# Patient Record
Sex: FEMALE | Race: WHITE | NOT HISPANIC OR LATINO | Employment: UNEMPLOYED | URBAN - METROPOLITAN AREA
[De-identification: names, ages, dates, MRNs, and addresses within clinical notes are randomized per-mention and may not be internally consistent; named-entity substitution may affect disease eponyms.]

---

## 2017-01-16 ENCOUNTER — OFFICE VISIT (OUTPATIENT)
Dept: OBSTETRICS AND GYNECOLOGY | Facility: CLINIC | Age: 47
End: 2017-01-16

## 2017-01-16 VITALS
BODY MASS INDEX: 51.91 KG/M2 | DIASTOLIC BLOOD PRESSURE: 80 MMHG | HEIGHT: 63 IN | WEIGHT: 293 LBS | SYSTOLIC BLOOD PRESSURE: 140 MMHG

## 2017-01-16 DIAGNOSIS — Z79.890 HORMONE REPLACEMENT THERAPY: ICD-10-CM

## 2017-01-16 DIAGNOSIS — Z01.419 WELL WOMAN EXAM WITH ROUTINE GYNECOLOGICAL EXAM: Primary | ICD-10-CM

## 2017-01-16 PROCEDURE — 99396 PREV VISIT EST AGE 40-64: CPT | Performed by: OBSTETRICS & GYNECOLOGY

## 2017-01-16 RX ORDER — ESTRADIOL 0.1 MG/D
1 FILM, EXTENDED RELEASE TRANSDERMAL 2 TIMES WEEKLY
Qty: 8 PATCH | Refills: 12 | Status: SHIPPED | OUTPATIENT
Start: 2017-01-16 | End: 2018-07-21 | Stop reason: SDUPTHER

## 2017-01-16 RX ORDER — ESTRADIOL 0.1 MG/D
FILM, EXTENDED RELEASE TRANSDERMAL
Refills: 3 | COMMUNITY
Start: 2016-12-26 | End: 2017-01-16 | Stop reason: SDUPTHER

## 2017-01-16 NOTE — PROGRESS NOTES
Subjective   Cari Watson is a 46 y.o. female is here today as a self referral for annual.    History of Present Illness-here today for annual exam and hormone therapy.    The following portions of the patient's history were reviewed and updated as appropriate: allergies, current medications, past family history, past medical history, past social history, past surgical history and problem list.    Review of Systems   Constitutional: Negative.    HENT: Negative.    Eyes: Negative.    Respiratory: Negative.    Cardiovascular: Negative.    Gastrointestinal: Negative.    Endocrine: Negative.    Genitourinary: Negative.    Musculoskeletal: Negative.    Skin: Negative.    Allergic/Immunologic: Negative.    Neurological: Negative.    Hematological: Negative.    Psychiatric/Behavioral: Negative.        Objective   Physical Exam   Constitutional: She is oriented to person, place, and time. She appears well-developed and well-nourished.   HENT:   Head: Normocephalic and atraumatic.   Nose: Nose normal.   Eyes: Conjunctivae and EOM are normal. Pupils are equal, round, and reactive to light.   Neck: Normal range of motion. Neck supple.   Cardiovascular: Normal rate, regular rhythm and normal heart sounds.    Pulmonary/Chest: Effort normal and breath sounds normal. Right breast exhibits no inverted nipple, no mass, no nipple discharge, no skin change and no tenderness. Left breast exhibits no inverted nipple, no mass, no nipple discharge and no skin change. Breasts are symmetrical. There is no breast swelling.   Abdominal: Soft. Hernia confirmed negative in the right inguinal area and confirmed negative in the left inguinal area.   Genitourinary: Rectum normal. No breast tenderness, discharge or bleeding. Pelvic exam was performed with patient supine. No labial fusion. There is no rash, tenderness, lesion or injury on the right labia. There is no rash, tenderness, lesion or injury on the left labia. Right adnexum displays no  mass, no tenderness and no fullness. Left adnexum displays no mass, no tenderness and no fullness. No erythema or bleeding in the vagina. No foreign body in the vagina. No signs of injury around the vagina. No vaginal discharge found.   Genitourinary Comments: Cervical stump appears normal.   Neurological: She is alert and oriented to person, place, and time. She has normal reflexes.   Skin: Skin is warm and dry.   Psychiatric: She has a normal mood and affect. Her behavior is normal. Judgment and thought content normal.         Assessment/Plan   Problems Addressed this Visit     None      Visit Diagnoses     Well woman exam with routine gynecological exam    -  Primary    Hormone replacement therapy            Mammogram ordered.  Hormone patches refilled.

## 2017-01-16 NOTE — MR AVS SNAPSHOT
Cari Watson   1/16/2017 8:30 AM   Office Visit    Dept Phone:  [phone]   Encounter #:  [id number]    Provider:  Tejas Flores MD   Department:  [facility]                Your Full Care Plan              Today's Medication Changes          These changes are accurate as of: 1/16/17  8:57 AM.  If you have any questions, ask your nurse or doctor.               Medication(s)that have changed:     estradiol 0.1 MG/24HR patch   Commonly known as:  MINIVELLE, VIVELLE-DOT   Place 1 patch on the skin 2 (Two) Times a Week.   What changed:  See the new instructions.   Changed by:  Tejas Flores MD            Where to Get Your Medications      These medications were sent to [id number] - [place] [address] [address] AT [facility] [place] [address] [address] [place] [address] - [phone]  - [phone] [place] [address] [address], [facility] [address]     Phone:  [phone]     estradiol 0.1 MG/24HR patch                  Your Updated Medication List          This list is accurate as of: 1/16/17  8:57 AM.  Always use your most recent med list.                estradiol 0.1 MG/24HR patch   Commonly known as:  MINIVELLE, VIVELLE-DOT   Place 1 patch on the skin 2 (Two) Times a Week.               You Were Diagnosed With        Codes Comments    Well woman exam with routine gynecological exam    -  Primary ICD-10-CM: Z01.419  ICD-9-CM: V72.31     Hormone replacement therapy     ICD-10-CM: Z79.890  ICD-9-CM: V07.4       Instructions     None    Patient Instructions History      Upcoming Appointments     Visit Type Date Time Department    WELLNESS 1/16/2017  8:30 AM [facility] [place]      [employer] Signup     [employer] allows you to send messages to your doctor, view your test results, renew your prescriptions, schedule appointments, and more. To sign up, go to [employer] and click on the Sign Up Now link in the New User?  "box. Enter your [employer] Activation Code exactly as it appears below along with the last four digits of your Social Security Number and your Date of Birth () to complete the sign-up process. If you do not sign up before the expiration date, you must request a new code.    Kik Activation Code: [email or web address]  Expires: 2017  8:57 AM    If you have questions, you can email [email or web address] or call [phone] to talk to our [employer] staff. Remember, [employer] is NOT to be used for urgent needs. For medical emergencies, dial 911.               Other Info from Your Visit           Allergies     Shrimp (Diagnostic)      Sulfa Antibiotics  Swelling      Reason for Visit     Gynecologic Exam MX DUE  REFILL ESTRADIOL PATCH      Vital Signs     Blood Pressure Height Weight Body Mass Index Smoking Status       140/80 63\" (160 cm) 296 lb (134 kg) 52.43 kg/m2 Current Every Day Smoker       Problems and Diagnoses Noted     Well woman exam with routine gynecological exam    -  Primary    Hormone replacement therapy            "

## 2017-02-01 ENCOUNTER — APPOINTMENT (OUTPATIENT)
Dept: MAMMOGRAPHY | Facility: CLINIC | Age: 47
End: 2017-02-01

## 2017-02-01 DIAGNOSIS — Z12.31 VISIT FOR SCREENING MAMMOGRAM: ICD-10-CM

## 2017-02-01 PROCEDURE — 77067 SCR MAMMO BI INCL CAD: CPT | Performed by: OBSTETRICS & GYNECOLOGY

## 2018-01-18 ENCOUNTER — OFFICE VISIT (OUTPATIENT)
Dept: OBSTETRICS AND GYNECOLOGY | Facility: CLINIC | Age: 48
End: 2018-01-18

## 2018-01-18 VITALS
BODY MASS INDEX: 51.91 KG/M2 | HEIGHT: 63 IN | SYSTOLIC BLOOD PRESSURE: 130 MMHG | WEIGHT: 293 LBS | DIASTOLIC BLOOD PRESSURE: 78 MMHG

## 2018-01-18 DIAGNOSIS — E66.9 OBESITY (BMI 30-39.9): ICD-10-CM

## 2018-01-18 DIAGNOSIS — Z01.419 PAP TEST, AS PART OF ROUTINE GYNECOLOGICAL EXAMINATION: Primary | ICD-10-CM

## 2018-01-18 DIAGNOSIS — K58.1 IRRITABLE BOWEL SYNDROME WITH CONSTIPATION: ICD-10-CM

## 2018-01-18 DIAGNOSIS — R63.5 WEIGHT GAIN: ICD-10-CM

## 2018-01-18 PROCEDURE — 99396 PREV VISIT EST AGE 40-64: CPT | Performed by: OBSTETRICS & GYNECOLOGY

## 2018-01-18 NOTE — PROGRESS NOTES
Subjective:    Patient Cari Watson is a 47 y.o. female.   Chief Complaint   Patient presents with   • Gynecologic Exam     Patient is here for her annual exam     CCPatient has several major issues that #1 new issue is weight gain patient had moderate obesity just started with an Endo Debi gained over 30 pounds she was successful during the Atkins diet but her  rectal specialist  told her not to do the Atkins diet because it increased her cholesterol the patient quit the Atkins diet and that's when she gained 30 pounds  joint pain   Skin yeast  and all skin on skin areas    HPI      The following portions of the patient's history were reviewed and updated as appropriate: allergies, current medications, past family history, past medical history, past social history, past surgical history and problem list.      Review of Systems   Constitutional: Positive for activity change, appetite change and fatigue.   HENT: Negative.    Eyes: Negative.    Respiratory: Positive for shortness of breath.    Cardiovascular: Negative.    Gastrointestinal: Positive for abdominal distention, constipation and nausea. Negative for abdominal pain, anal bleeding, blood in stool, diarrhea, rectal pain and vomiting.   Endocrine: Positive for heat intolerance. Negative for cold intolerance, polydipsia, polyphagia and polyuria.   Genitourinary: Negative.  Negative for decreased urine volume, dyspareunia, dysuria, enuresis, flank pain, frequency, genital sores, hematuria, menstrual problem, pelvic pain, urgency, vaginal bleeding, vaginal discharge and vaginal pain.   Musculoskeletal: Positive for arthralgias, joint swelling and myalgias.   Skin: Positive for rash.   Allergic/Immunologic: Negative.    Neurological: Negative.    Hematological: Negative for adenopathy. Does not bruise/bleed easily.   Psychiatric/Behavioral: Negative for agitation, confusion and sleep disturbance. The patient is not nervous/anxious.           Objective:      Physical Exam   Constitutional: She appears well-developed and well-nourished. She is not intubated.   HENT:   Head: Hair is normal.   Nose: Nose normal.   Mouth/Throat: Oropharynx is clear and moist.   Eyes: Conjunctivae are normal.   Neck: Normal carotid pulses and no JVD present. No tracheal tenderness, no spinous process tenderness and no muscular tenderness present. Carotid bruit is not present. No rigidity. No edema, no erythema and normal range of motion present. No thyroid mass and no thyromegaly present.   Cardiovascular: Normal rate, regular rhythm, S1 normal and normal heart sounds.  Exam reveals no gallop.    No murmur heard.  Pulmonary/Chest: Effort normal. No accessory muscle usage or stridor. No apnea, no tachypnea and no bradypnea. She is not intubated. No respiratory distress. She has no wheezes. She has no rales. She exhibits no tenderness. Right breast exhibits no inverted nipple, no mass, no nipple discharge, no skin change and no tenderness. Left breast exhibits no inverted nipple, no mass, no nipple discharge, no skin change and no tenderness.   Abdominal: Soft. Bowel sounds are normal. She exhibits no distension and no mass. There is no tenderness. There is no rebound and no guarding. No hernia.   Genitourinary: Vagina normal and uterus normal. Rectal exam shows no external hemorrhoid, no internal hemorrhoid, no fissure, no mass, no tenderness and anal tone normal. There is no rash, tenderness, lesion or injury on the right labia. There is no rash, tenderness, lesion or injury on the left labia. Uterus is not deviated, not enlarged, not fixed and not tender. Cervix exhibits no motion tenderness, no discharge and no friability. Right adnexum displays no mass and no tenderness. Left adnexum displays no mass and no tenderness. No erythema, tenderness or bleeding in the vagina. No foreign body in the vagina. No signs of injury around the vagina. No vaginal discharge found.    Musculoskeletal: She exhibits edema. She exhibits no tenderness.        Right shoulder: She exhibits no tenderness, no swelling, no pain and no spasm.   Lymphadenopathy:        Head (right side): No submental, no submandibular, no tonsillar, no preauricular, no posterior auricular and no occipital adenopathy present.        Head (left side): No submental, no submandibular, no tonsillar, no preauricular, no posterior auricular and no occipital adenopathy present.     She has no cervical adenopathy.        Right cervical: No superficial cervical, no deep cervical and no posterior cervical adenopathy present.       Left cervical: No superficial cervical, no deep cervical and no posterior cervical adenopathy present.        Right axillary: No pectoral and no lateral adenopathy present.        Left axillary: No pectoral and no lateral adenopathy present.       Right: No inguinal, no supraclavicular and no epitrochlear adenopathy present.        Left: No inguinal, no supraclavicular and no epitrochlear adenopathy present.   Neurological: No cranial nerve deficit. Coordination normal.   Skin: Skin is warm and dry. Rash noted. No abrasion, no bruising, no burn, no lesion, no petechiae and no purpura noted. Rash is not macular, not maculopapular, not nodular and not urticarial. No cyanosis or erythema. No pallor. Nails show no clubbing.   Psychiatric: She has a normal mood and affect. Her behavior is normal.         Assessment and Plan: Very long discussion about patient decisions  she was very successful with the Atkins diet and after she had a perirectal abscess and her proctologist told her that the Atkins diet caused her issues the patient gained 30 pounds she was the successful though with doing the Atkins and it certainly is recommended that she restart it because of the the 30 pack lenses putting her into the morbid obesity area commended that she go back to the Atkins diet and increase her fluid intake and the  irritable bowel syndrome is discussed and she is given Linzess to use a small amount of the powder every day she also still smokes  quit smoking so the patient is given a prescription for Chantix which she has taken previously she is encouraged to the not smoke in the house or in the car and at least 10 minutes discussion about the smoking the patient is going to start the Atkins diet and a prescriptions given for Chantix she does have skin issues with the skin on skin areas with for uncles and's recommended she use a and D ointment on a ongoing basis to all of the skin on skin areas smoking discussion 10 minutes obesity discussion 30 minutes exam 15 minutes skin on skin discussion and    Patient has been instructed to perform a self breast exam on a weekly basis, a yearly mammogram, pap smear yearly unless instructed otherwise and bone density every 2 years.  I recommended that the patient not smoke, and discussed smoking cessation when appropriate.     Cari was seen today for gynecologic exam.    Diagnoses and all orders for this visit:    Pap test, as part of routine gynecological examination  -     Pap IG, Rfx HPV ASCU - ThinPrep Vial, Cervix    Obesity (BMI 30-39.9)    Weight gain    Irritable bowel syndrome with constipation

## 2018-01-22 LAB
CONV .: NORMAL
CYTOLOGIST CVX/VAG CYTO: NORMAL
CYTOLOGY CVX/VAG DOC THIN PREP: NORMAL
DX ICD CODE: NORMAL
HIV 1 & 2 AB SER-IMP: NORMAL
OTHER STN SPEC: NORMAL
PATH REPORT.FINAL DX SPEC: NORMAL
STAT OF ADQ CVX/VAG CYTO-IMP: NORMAL

## 2018-06-06 ENCOUNTER — TELEPHONE (OUTPATIENT)
Dept: OBSTETRICS AND GYNECOLOGY | Facility: CLINIC | Age: 48
End: 2018-06-06

## 2018-06-06 RX ORDER — METRONIDAZOLE 500 MG/1
500 TABLET ORAL 2 TIMES DAILY
Qty: 14 TABLET | Refills: 0 | Status: SHIPPED | OUTPATIENT
Start: 2018-06-06 | End: 2018-06-13

## 2018-06-06 NOTE — TELEPHONE ENCOUNTER
Escribed Flagyl 500 mg #13 BID for 7 days. Per Dr. Mercer. Informed pt it was sent to the pharm. SM  ----- Message from Kellen Alonzo sent at 6/6/2018 12:40 PM EDT -----  PT called, has bacteria vaginosis and would like flagyl called in. PT states she is allergic to sulfa medications and states that the flagyl works well with PT. PT #[phone]

## 2018-07-23 RX ORDER — ESTRADIOL 0.1 MG/D
FILM, EXTENDED RELEASE TRANSDERMAL
Qty: 8 PATCH | Refills: 0 | Status: SHIPPED | OUTPATIENT
Start: 2018-07-23 | End: 2018-08-25 | Stop reason: SDUPTHER

## 2018-08-27 RX ORDER — ESTRADIOL 0.1 MG/D
FILM, EXTENDED RELEASE TRANSDERMAL
Qty: 8 PATCH | Refills: 0 | Status: SHIPPED | OUTPATIENT
Start: 2018-08-27 | End: 2018-09-23 | Stop reason: SDUPTHER

## 2018-09-24 RX ORDER — ESTRADIOL 0.1 MG/D
FILM, EXTENDED RELEASE TRANSDERMAL
Qty: 8 PATCH | Refills: 0 | Status: SHIPPED | OUTPATIENT
Start: 2018-09-24 | End: 2018-10-26 | Stop reason: SDUPTHER

## 2018-10-26 RX ORDER — ESTRADIOL 0.1 MG/D
FILM, EXTENDED RELEASE TRANSDERMAL
Qty: 8 PATCH | Refills: 0 | Status: SHIPPED | OUTPATIENT
Start: 2018-10-26 | End: 2018-11-22 | Stop reason: SDUPTHER

## 2018-11-26 RX ORDER — ESTRADIOL 0.1 MG/D
FILM, EXTENDED RELEASE TRANSDERMAL
Qty: 8 PATCH | Refills: 0 | Status: SHIPPED | OUTPATIENT
Start: 2018-11-26 | End: 2018-12-22 | Stop reason: SDUPTHER

## 2018-12-19 DIAGNOSIS — Z12.31 VISIT FOR SCREENING MAMMOGRAM: Primary | ICD-10-CM

## 2018-12-24 RX ORDER — ESTRADIOL 0.1 MG/D
FILM, EXTENDED RELEASE TRANSDERMAL
Qty: 8 PATCH | Refills: 0 | Status: SHIPPED | OUTPATIENT
Start: 2018-12-24 | End: 2019-01-27 | Stop reason: SDUPTHER

## 2019-01-28 RX ORDER — ESTRADIOL 0.1 MG/D
FILM, EXTENDED RELEASE TRANSDERMAL
Qty: 8 PATCH | Refills: 0 | Status: SHIPPED | OUTPATIENT
Start: 2019-01-28 | End: 2019-02-27 | Stop reason: SDUPTHER

## 2019-02-05 ENCOUNTER — HOSPITAL ENCOUNTER (OUTPATIENT)
Dept: MAMMOGRAPHY | Facility: HOSPITAL | Age: 49
Discharge: HOME | End: 2019-02-05
Attending: OBSTETRICS & GYNECOLOGY | Admitting: OBSTETRICS & GYNECOLOGY

## 2019-02-05 DIAGNOSIS — Z12.31 VISIT FOR SCREENING MAMMOGRAM: ICD-10-CM

## 2019-02-05 PROCEDURE — 77063 BREAST TOMOSYNTHESIS BI: CPT

## 2019-02-05 PROCEDURE — 77067 SCR MAMMO BI INCL CAD: CPT

## 2019-02-20 ENCOUNTER — APPOINTMENT (OUTPATIENT)
Dept: CT IMAGING | Facility: HOSPITAL | Age: 49
End: 2019-02-20

## 2019-02-20 ENCOUNTER — HOSPITAL ENCOUNTER (EMERGENCY)
Facility: HOSPITAL | Age: 49
Discharge: HOME | End: 2019-02-20
Attending: EMERGENCY MEDICINE | Admitting: EMERGENCY MEDICINE

## 2019-02-20 VITALS
DIASTOLIC BLOOD PRESSURE: 64 MMHG | HEIGHT: 62 IN | OXYGEN SATURATION: 98 % | SYSTOLIC BLOOD PRESSURE: 111 MMHG | HEART RATE: 80 BPM | TEMPERATURE: 98.8 F | WEIGHT: 293 LBS | BODY MASS INDEX: 53.92 KG/M2 | RESPIRATION RATE: 16 BRPM

## 2019-02-20 DIAGNOSIS — K43.9 SUPRAUMBILICAL HERNIA: Primary | ICD-10-CM

## 2019-02-20 LAB
ALBUMIN SERPL-MCNC: 4.1 G/DL (ref 3.5–5.2)
ALBUMIN/GLOB SERPL: 1.4 G/DL
ALP SERPL-CCNC: 56 U/L (ref 39–117)
ALT SERPL W P-5'-P-CCNC: 18 U/L (ref 1–33)
ANION GAP SERPL CALCULATED.3IONS-SCNC: 13.5 MMOL/L
AST SERPL-CCNC: 13 U/L (ref 1–32)
BACTERIA UR QL AUTO: ABNORMAL /HPF
BASOPHILS # BLD AUTO: 0.06 10*3/MM3 (ref 0–0.2)
BASOPHILS NFR BLD AUTO: 0.5 % (ref 0–1.5)
BILIRUB SERPL-MCNC: 0.3 MG/DL (ref 0.1–1.2)
BILIRUB UR QL STRIP: NEGATIVE
BUN BLD-MCNC: 10 MG/DL (ref 6–20)
BUN/CREAT SERPL: 14.5 (ref 7–25)
CALCIUM SPEC-SCNC: 8.8 MG/DL (ref 8.6–10.5)
CHLORIDE SERPL-SCNC: 106 MMOL/L (ref 98–107)
CLARITY UR: CLEAR
CO2 SERPL-SCNC: 24.5 MMOL/L (ref 22–29)
COLOR UR: YELLOW
CREAT BLD-MCNC: 0.69 MG/DL (ref 0.57–1)
DEPRECATED RDW RBC AUTO: 46.5 FL (ref 37–54)
EOSINOPHIL # BLD AUTO: 0.37 10*3/MM3 (ref 0–0.4)
EOSINOPHIL NFR BLD AUTO: 3.3 % (ref 0.3–6.2)
ERYTHROCYTE [DISTWIDTH] IN BLOOD BY AUTOMATED COUNT: 13 % (ref 12.3–15.4)
GFR SERPL CREATININE-BSD FRML MDRD: 91 ML/MIN/1.73
GLOBULIN UR ELPH-MCNC: 3 GM/DL
GLUCOSE BLD-MCNC: 105 MG/DL (ref 65–99)
GLUCOSE UR STRIP-MCNC: NEGATIVE MG/DL
HCT VFR BLD AUTO: 40.4 % (ref 34–46.6)
HGB BLD-MCNC: 13.2 G/DL (ref 12–15.9)
HGB UR QL STRIP.AUTO: ABNORMAL
HYALINE CASTS UR QL AUTO: ABNORMAL /LPF
IMM GRANULOCYTES # BLD AUTO: 0.06 10*3/MM3 (ref 0–0.05)
IMM GRANULOCYTES NFR BLD AUTO: 0.5 % (ref 0–0.5)
INR PPP: 0.96 (ref 0.9–1.1)
KETONES UR QL STRIP: ABNORMAL
LEUKOCYTE ESTERASE UR QL STRIP.AUTO: NEGATIVE
LIPASE SERPL-CCNC: 18 U/L (ref 13–60)
LYMPHOCYTES # BLD AUTO: 3.41 10*3/MM3 (ref 0.7–3.1)
LYMPHOCYTES NFR BLD AUTO: 30.2 % (ref 19.6–45.3)
MCH RBC QN AUTO: 32 PG (ref 26.6–33)
MCHC RBC AUTO-ENTMCNC: 32.7 G/DL (ref 31.5–35.7)
MCV RBC AUTO: 97.8 FL (ref 79–97)
MONOCYTES # BLD AUTO: 0.82 10*3/MM3 (ref 0.1–0.9)
MONOCYTES NFR BLD AUTO: 7.3 % (ref 5–12)
NEUTROPHILS # BLD AUTO: 6.59 10*3/MM3 (ref 1.4–7)
NEUTROPHILS NFR BLD AUTO: 58.2 % (ref 42.7–76)
NITRITE UR QL STRIP: NEGATIVE
NRBC BLD AUTO-RTO: 0 /100 WBC (ref 0–0)
PH UR STRIP.AUTO: <=5 [PH] (ref 5–8)
PLATELET # BLD AUTO: 286 10*3/MM3 (ref 140–450)
PMV BLD AUTO: 9.5 FL (ref 6–12)
POTASSIUM BLD-SCNC: 4 MMOL/L (ref 3.5–5.2)
PROT SERPL-MCNC: 7.1 G/DL (ref 6–8.5)
PROT UR QL STRIP: NEGATIVE
PROTHROMBIN TIME: 12.6 SECONDS (ref 11.7–14.2)
RBC # BLD AUTO: 4.13 10*6/MM3 (ref 3.77–5.28)
RBC # UR: ABNORMAL /HPF
REF LAB TEST METHOD: ABNORMAL
SODIUM BLD-SCNC: 144 MMOL/L (ref 136–145)
SP GR UR STRIP: 1.02 (ref 1–1.03)
SQUAMOUS #/AREA URNS HPF: ABNORMAL /HPF
UROBILINOGEN UR QL STRIP: ABNORMAL
WBC NRBC COR # BLD: 11.31 10*3/MM3 (ref 3.4–10.8)
WBC UR QL AUTO: ABNORMAL /HPF

## 2019-02-20 PROCEDURE — 96375 TX/PRO/DX INJ NEW DRUG ADDON: CPT

## 2019-02-20 PROCEDURE — 25010000002 IOPAMIDOL 61 % SOLUTION: Performed by: EMERGENCY MEDICINE

## 2019-02-20 PROCEDURE — 25010000002 ONDANSETRON PER 1 MG: Performed by: EMERGENCY MEDICINE

## 2019-02-20 PROCEDURE — 80053 COMPREHEN METABOLIC PANEL: CPT | Performed by: EMERGENCY MEDICINE

## 2019-02-20 PROCEDURE — 83690 ASSAY OF LIPASE: CPT | Performed by: EMERGENCY MEDICINE

## 2019-02-20 PROCEDURE — 96374 THER/PROPH/DIAG INJ IV PUSH: CPT

## 2019-02-20 PROCEDURE — 96361 HYDRATE IV INFUSION ADD-ON: CPT

## 2019-02-20 PROCEDURE — 85025 COMPLETE CBC W/AUTO DIFF WBC: CPT | Performed by: EMERGENCY MEDICINE

## 2019-02-20 PROCEDURE — 85610 PROTHROMBIN TIME: CPT | Performed by: EMERGENCY MEDICINE

## 2019-02-20 PROCEDURE — 25010000002 HYDROMORPHONE PER 4 MG: Performed by: EMERGENCY MEDICINE

## 2019-02-20 PROCEDURE — 81001 URINALYSIS AUTO W/SCOPE: CPT | Performed by: EMERGENCY MEDICINE

## 2019-02-20 PROCEDURE — 99284 EMERGENCY DEPT VISIT MOD MDM: CPT

## 2019-02-20 PROCEDURE — 74177 CT ABD & PELVIS W/CONTRAST: CPT

## 2019-02-20 RX ORDER — ONDANSETRON 2 MG/ML
4 INJECTION INTRAMUSCULAR; INTRAVENOUS ONCE
Status: COMPLETED | OUTPATIENT
Start: 2019-02-20 | End: 2019-02-20

## 2019-02-20 RX ORDER — ONDANSETRON 4 MG/1
4 TABLET, FILM COATED ORAL EVERY 8 HOURS PRN
Qty: 10 TABLET | Refills: 0 | Status: SHIPPED | OUTPATIENT
Start: 2019-02-20 | End: 2019-02-26 | Stop reason: SDUPTHER

## 2019-02-20 RX ORDER — HYDROCODONE BITARTRATE AND ACETAMINOPHEN 7.5; 325 MG/1; MG/1
1 TABLET ORAL EVERY 4 HOURS PRN
Qty: 20 TABLET | Refills: 0 | Status: SHIPPED | OUTPATIENT
Start: 2019-02-20 | End: 2019-02-20

## 2019-02-20 RX ORDER — HYDROMORPHONE HYDROCHLORIDE 1 MG/ML
0.5 INJECTION, SOLUTION INTRAMUSCULAR; INTRAVENOUS; SUBCUTANEOUS ONCE
Status: COMPLETED | OUTPATIENT
Start: 2019-02-20 | End: 2019-02-20

## 2019-02-20 RX ORDER — OXYCODONE AND ACETAMINOPHEN 7.5; 325 MG/1; MG/1
1 TABLET ORAL EVERY 6 HOURS PRN
Qty: 10 TABLET | Refills: 0 | Status: SHIPPED | OUTPATIENT
Start: 2019-02-20 | End: 2019-06-19

## 2019-02-20 RX ADMIN — ONDANSETRON 4 MG: 2 INJECTION INTRAMUSCULAR; INTRAVENOUS at 20:23

## 2019-02-20 RX ADMIN — SODIUM CHLORIDE 1000 ML: 9 INJECTION, SOLUTION INTRAVENOUS at 18:46

## 2019-02-20 RX ADMIN — HYDROMORPHONE HYDROCHLORIDE 0.5 MG: 1 INJECTION, SOLUTION INTRAMUSCULAR; INTRAVENOUS; SUBCUTANEOUS at 20:25

## 2019-02-20 RX ADMIN — IOPAMIDOL 85 ML: 612 INJECTION, SOLUTION INTRAVENOUS at 20:41

## 2019-02-20 NOTE — ED NOTES
"\"Oh yeah, I feel like I have a softball in my stomach\" reports symptoms starting this morning, getting worse as the day goes on     Reena Marie  02/20/19 1813    "

## 2019-02-20 NOTE — ED PROVIDER NOTES
" EMERGENCY DEPARTMENT ENCOUNTER    CHIEF COMPLAINT  Chief Complaint: Abd pain  History given by: Pt  History limited by: None  Room Number: 38/38  PMD: Vipul Keene MD      HPI:  Pt is a 48 y.o. female who presents complaining of worsening umbilical abd pain that began this morning. Pt states she noticed erythema and a \"knot\" around her umbilicus this morning when she took a shower. Pt also c/o nausea. Pt denies vomiting and diarrhea. She states she has recently been cleaning out her house and doing more physical activity moving boxes. Pt reports a h/o IBS. Pt denies h/o hernia. She has a h/o laparoscopic hysterectomy.     Duration:  today  Onset: gradual  Timing: constant  Location: abd  Radiation: none  Quality: pain  Intensity/Severity: moderate   Progression: worsening  Associated Symptoms: erythema and \"knot\" around umbilicus, nausea   Aggravating Factors: none stated  Previous Episodes: none  Treatment before arrival: none     PAST MEDICAL HISTORY  Active Ambulatory Problems     Diagnosis Date Noted   • No Active Ambulatory Problems     Resolved Ambulatory Problems     Diagnosis Date Noted   • No Resolved Ambulatory Problems     Past Medical History:   Diagnosis Date   • Diverticulosis    • Fibrocystic breast    • IBS (irritable bowel syndrome)    • Sleep apnea        PAST SURGICAL HISTORY  Past Surgical History:   Procedure Laterality Date   • ANAL FISTULA REPAIR     • HYSTERECTOMY     • OOPHORECTOMY         FAMILY HISTORY  Family History   Problem Relation Age of Onset   • Breast cancer Maternal Grandmother        SOCIAL HISTORY  Social History     Socioeconomic History   • Marital status:      Spouse name: Not on file   • Number of children: Not on file   • Years of education: Not on file   • Highest education level: Not on file   Social Needs   • Financial resource strain: Not on file   • Food insecurity - worry: Not on file   • Food insecurity - inability: Not on file   • Transportation " "needs - medical: Not on file   • Transportation needs - non-medical: Not on file   Occupational History   • Not on file   Tobacco Use   • Smoking status: Current Every Day Smoker   • Smokeless tobacco: Never Used   Substance and Sexual Activity   • Alcohol use: Yes     Comment: occasionaly   • Drug use: No   • Sexual activity: Not on file   Other Topics Concern   • Not on file   Social History Narrative   • Not on file       ALLERGIES  Shrimp (diagnostic) and Sulfa antibiotics    REVIEW OF SYSTEMS  Review of Systems   Constitutional: Negative for fever.   HENT: Negative for sore throat.    Eyes: Negative.    Respiratory: Negative for cough and shortness of breath.    Cardiovascular: Negative for chest pain.   Gastrointestinal: Positive for abdominal pain (umbilical) and nausea. Negative for diarrhea and vomiting.        Erythema and \"knot\" around umbilicus    Genitourinary: Negative for dysuria.   Musculoskeletal: Negative for neck pain.   Skin: Negative for rash.   Neurological: Negative for weakness, numbness and headaches.   Hematological: Negative.    Psychiatric/Behavioral: Negative.    All other systems reviewed and are negative.      PHYSICAL EXAM  ED Triage Vitals [02/20/19 1812]   Temp Heart Rate Resp BP SpO2   99.2 °F (37.3 °C) 94 16 -- 98 %      Temp src Heart Rate Source Patient Position BP Location FiO2 (%)   -- -- -- -- --       Physical Exam   Constitutional: She is oriented to person, place, and time. No distress.   HENT:   Head: Normocephalic and atraumatic.   Mouth/Throat: Oropharynx is clear and moist.   Eyes: EOM are normal. Pupils are equal, round, and reactive to light.   Neck: Normal range of motion. Neck supple.   Cardiovascular: Normal rate, regular rhythm and normal heart sounds.   Pulmonary/Chest: Effort normal and breath sounds normal. No respiratory distress.   Abdominal: Soft. There is tenderness (umbilical and supraumbilical). There is no rebound and no guarding.   diminished bowel " sounds  2x2cm umbilical mass   Musculoskeletal: Normal range of motion. She exhibits no edema.   Neurological: She is alert and oriented to person, place, and time. She has normal sensation and normal strength.   Skin: Skin is warm and dry. No rash noted.   Psychiatric: Mood and affect normal.   Nursing note and vitals reviewed.      LAB RESULTS  Lab Results (last 24 hours)     Procedure Component Value Units Date/Time    CBC & Differential [[id number]] Collected:  02/20/19 1845    Specimen:  Blood Updated:  02/20/19 1901    Narrative:       The following orders were created for panel order CBC & Differential.  Procedure                               Abnormality         Status                     ---------                               -----------         ------                     CBC Auto Differential[[id number]]         Abnormal            Final result                 Please view results for these tests on the individual orders.    Comprehensive Metabolic Panel [[id number]]  (Abnormal) Collected:  02/20/19 1845    Specimen:  Blood Updated:  02/20/19 1919     Glucose 105 mg/dL      BUN 10 mg/dL      Creatinine 0.69 mg/dL      Sodium 144 mmol/L      Potassium 4.0 mmol/L      Chloride 106 mmol/L      CO2 24.5 mmol/L      Calcium 8.8 mg/dL      Total Protein 7.1 g/dL      Albumin 4.10 g/dL      ALT (SGPT) 18 U/L      AST (SGOT) 13 U/L      Alkaline Phosphatase 56 U/L      Total Bilirubin 0.3 mg/dL      eGFR Non African Amer 91 mL/min/1.73      Globulin 3.0 gm/dL      A/G Ratio 1.4 g/dL      BUN/Creatinine Ratio 14.5     Anion Gap 13.5 mmol/L     Narrative:       GFR Normal >60  Chronic Kidney Disease <60  Kidney Failure <15    Protime-INR [[id number]]  (Normal) Collected:  02/20/19 1845    Specimen:  Blood Updated:  02/20/19 1912     Protime 12.6 Seconds      INR 0.96    Lipase [[id number]]  (Normal) Collected:  02/20/19 1845    Specimen:  Blood Updated:  02/20/19 1919     Lipase 18 U/L     CBC Auto Differential [[id number]]   (Abnormal) Collected:  02/20/19 1845    Specimen:  Blood Updated:  02/20/19 1901     WBC 11.31 10*3/mm3      RBC 4.13 10*6/mm3      Hemoglobin 13.2 g/dL      Hematocrit 40.4 %      MCV 97.8 fL      MCH 32.0 pg      MCHC 32.7 g/dL      RDW 13.0 %      RDW-SD 46.5 fl      MPV 9.5 fL      Platelets 286 10*3/mm3      Neutrophil % 58.2 %      Lymphocyte % 30.2 %      Monocyte % 7.3 %      Eosinophil % 3.3 %      Basophil % 0.5 %      Immature Grans % 0.5 %      Neutrophils, Absolute 6.59 10*3/mm3      Lymphocytes, Absolute 3.41 10*3/mm3      Monocytes, Absolute 0.82 10*3/mm3      Eosinophils, Absolute 0.37 10*3/mm3      Basophils, Absolute 0.06 10*3/mm3      Immature Grans, Absolute 0.06 10*3/mm3      nRBC 0.0 /100 WBC     Urinalysis With Microscopic If Indicated (No Culture) - Urine, Clean Catch [[id number]]  (Abnormal) Collected:  02/20/19 1846    Specimen:  Urine, Clean Catch Updated:  02/20/19 1909     Color, UA Yellow     Appearance, UA Clear     pH, UA <=5.0     Specific Gravity, UA 1.022     Glucose, UA Negative     Ketones, UA Trace     Bilirubin, UA Negative     Blood, UA Moderate (2+)     Protein, UA Negative     Leuk Esterase, UA Negative     Nitrite, UA Negative     Urobilinogen, UA 0.2 E.U./dL    Urinalysis, Microscopic Only - Urine, Clean Catch [[id number]]  (Abnormal) Collected:  02/20/19 1846    Specimen:  Urine, Clean Catch Updated:  02/20/19 1909     RBC, UA 6-12 /HPF      WBC, UA 0-2 /HPF      Bacteria, UA 1+ /HPF      Squamous Epithelial Cells, UA 3-6 /HPF      Hyaline Casts, UA 0-2 /LPF      Methodology Automated Microscopy          I ordered the above labs and reviewed the results    RADIOLOGY  CT Abdomen Pelvis With Contrast   Preliminary Result   1. Small supraumbilical hernia contains fat and wall thickening and   surrounding inflammation suggesting an incarcerated hernia with fat   necrosis/inflammation. No bowel loop extends into this hernia.   2. A 3 mm right middle lobe noncalcified pleural-based  nodule. This is   most likely benign and no follow-up is needed unless the patient is a   high-risk patient.       Findings were discussed with Dr. Valderrama in the emergency department on   02/20/2019 at 9 PM.       Radiation dose reduction techniques were utilized, including automated   exposure control and exposure modulation based on body size.                   I ordered the above noted radiological studies. Interpreted by radiologist. Discussed with radiologist (Dr. Toure). Reviewed by me in PACS.       PROCEDURES  Procedures      PROGRESS AND CONSULTS   6:26 PM  Labs and CT abd pelvis ordered for evaluation.     8:10 PM  Dilaudid and Zofran ordered for pain.     8:59 PM  Dr. Toure, radiology, reports CT shows a 4cm supraumbilical hernia with incarcerated fat but no bowel. Consult placed to surgery.   Rechecked pt who is resting in NAD. Informed pt of hernia seen on CT. Pt is still in pain and I am unable to reduce the hernia.     9:16 PM  Discussed case with Dr. Jaylon Chacko, general surgery. He has reviewed CT and agrees with radiology reading. He states pt does not need emergent surgery. He recommends pt be discharged with pain medicine and f/u in his office tomorrow for elective surgery.     Informed pt of my discussion with Dr. Chacko. Discussed admission vs discharge. Pt would like to be discharged. Pt understands and agrees with the plan, all questions answered. She will f/u with surgery tomorrow.     MEDICAL DECISION MAKING  Results were reviewed/discussed with the patient and they were also made aware of online access. Pt also made aware that some labs, such as cultures, will not be resulted during ER visit and follow up with PMD is necessary.     MDM  Number of Diagnoses or Management Options     Amount and/or Complexity of Data Reviewed  Clinical lab tests: ordered and reviewed (Urinalysis- RBC 6-12, WBC 0-2, Bacteria 1+  WBC-11.31)  Tests in the radiology section of CPT®: ordered and reviewed (CT  abd-4cm supraumbilical hernia with incarcerated fat but no bowel)  Discussion of test results with the performing providers: yes (Dr. Toure, radiology)  Decide to obtain previous medical records or to obtain history from someone other than the patient: yes  Review and summarize past medical records: yes  Discuss the patient with other providers: yes (Dr. Jaylon Chacko, general surgery)           DIAGNOSIS  Final diagnoses:   Supraumbilical hernia       DISPOSITION  DISCHARGE    Patient discharged in stable condition.    Reviewed implications of results, diagnosis, meds, responsibility to follow up, warning signs and symptoms of possible worsening, potential complications and reasons to return to ER.    Patient/Family voiced understanding of above instructions.    Discussed plan for discharge, as there is no emergent indication for admission. Patient referred to primary care provider for BP management due to today's BP. Pt/family is agreeable and understands need for follow up and repeat testing.  Pt is aware that discharge does not mean that nothing is wrong but it indicates no emergency is present that requires admission and they must continue care with follow-up as given below or physician of their choice.     FOLLOW-UP  Vince Chacko Jr., MD  [facility] [address]  [phone]    In 1 day           Medication List      New Prescriptions    HYDROcodone-acetaminophen 7.5-325 MG per tablet  Commonly known as:  NORCO  Take 1 tablet by mouth Every 4 (Four) Hours As Needed for Moderate Pain .     ondansetron 4 MG tablet  Commonly known as:  ZOFRAN  Take 1 tablet by mouth Every 8 (Eight) Hours As Needed for Nausea or   Vomiting.          Latest Documented Vital Signs:  As of 9:32 PM  BP- 122/72 HR- 69 Temp- 98.5 °F (36.9 °C) (Oral) O2 sat- 100%    --  Documentation assistance provided by geovanna Quiñones for Dr. Valderrama.  Information recorded by the geovanna was done at my direction and has been  verified and validated by me.     Adali Quiñones  02/20/19 2132       Christiano Valderrama MD  02/20/19 2153

## 2019-02-20 NOTE — ED NOTES
"Patient states \"I think I have a hernia, I have pain, a knot that was red this morning, nausea, a frothy mouth and GERD, I also have IBS and I know what those symptoms are this I different.\"     Reena Marie  02/20/19 1812    "

## 2019-02-26 ENCOUNTER — OFFICE VISIT (OUTPATIENT)
Dept: SURGERY | Facility: CLINIC | Age: 49
End: 2019-02-26

## 2019-02-26 VITALS — HEIGHT: 62 IN | OXYGEN SATURATION: 98 % | HEART RATE: 84 BPM | WEIGHT: 293 LBS | BODY MASS INDEX: 53.92 KG/M2

## 2019-02-26 DIAGNOSIS — K43.6 INCARCERATED VENTRAL HERNIA: Primary | ICD-10-CM

## 2019-02-26 PROCEDURE — 99243 OFF/OP CNSLTJ NEW/EST LOW 30: CPT | Performed by: SURGERY

## 2019-02-26 RX ORDER — ONDANSETRON 4 MG/1
4 TABLET, FILM COATED ORAL EVERY 6 HOURS PRN
Qty: 30 TABLET | Refills: 0 | Status: SHIPPED | OUTPATIENT
Start: 2019-02-26 | End: 2019-06-19

## 2019-02-26 NOTE — PROGRESS NOTES
Subjective   Cari Watson is a 48 y.o. female who presents to the office in surgical consultation from Vipul Keene MD for incarcerated ventral hernia.    History of Present Illness     The patient has had a laparoscopic tubal ligation and a laparoscopic hysterectomy and presented to the emergency room late last week with significant abdominal pain with nausea and vomiting.  A CT scan of the abdomen and pelvis was performed that showed an incarcerated supraumbilical ventral hernia that contained fatty tissue.  The bowel was normal.  She was discharged home with pain medicine and nausea medicine but has not really taken much of that.  She continues to have discomfort but does not have pain.  Her biggest symptom is nausea.  Her bowel function has been basically normal.    The patient has a BMI of 57.1 and this was discussed with her.  She has been on diets in the past and lost weight but it has just returned.  She has considered bariatric surgery but never pursued this option.    Review of Systems   Constitutional: Positive for activity change and appetite change. Negative for fatigue and fever.   HENT: Negative for trouble swallowing and voice change.    Respiratory: Negative for chest tightness and shortness of breath.    Cardiovascular: Negative for chest pain and palpitations.   Gastrointestinal: Positive for abdominal pain and nausea. Negative for blood in stool, constipation, diarrhea and vomiting.   Endocrine: Negative for cold intolerance and heat intolerance.   Genitourinary: Negative for dysuria and flank pain.   Neurological: Negative for dizziness and light-headedness.   Hematological: Negative for adenopathy. Does not bruise/bleed easily.   Psychiatric/Behavioral: Negative for agitation and confusion.     Past Medical History:   Diagnosis Date   • Diverticulosis    • Fibrocystic breast    • IBS (irritable bowel syndrome)    • Sleep apnea      Past Surgical History:   Procedure Laterality Date    • ANAL FISTULA REPAIR     • HYSTERECTOMY     • OOPHORECTOMY       Family History   Problem Relation Age of Onset   • Breast cancer Maternal Grandmother      Social History     Socioeconomic History   • Marital status:      Spouse name: Not on file   • Number of children: Not on file   • Years of education: Not on file   • Highest education level: Not on file   Social Needs   • Financial resource strain: Not on file   • Food insecurity - worry: Not on file   • Food insecurity - inability: Not on file   • Transportation needs - medical: Not on file   • Transportation needs - non-medical: Not on file   Occupational History   • Not on file   Tobacco Use   • Smoking status: Current Every Day Smoker   • Smokeless tobacco: Never Used   Substance and Sexual Activity   • Alcohol use: Yes     Comment: occasionaly   • Drug use: No   • Sexual activity: Not on file   Other Topics Concern   • Not on file   Social History Narrative   • Not on file       Objective   Physical Exam   Constitutional: She is oriented to person, place, and time. She appears well-developed and well-nourished.  Non-toxic appearance.   Eyes: EOM are normal. No scleral icterus.   Pulmonary/Chest: Effort normal. No respiratory distress.   Abdominal: Soft. Normal appearance. There is tenderness (Mildly tender) in the periumbilical area. A hernia (The incarcerated ventral hernia is minimally palpable) is present. Hernia confirmed positive in the ventral area.   Neurological: She is alert and oriented to person, place, and time.   Skin: Skin is warm and dry.   Psychiatric: She has a normal mood and affect. Her behavior is normal. Judgment and thought content normal.       Assessment/Plan       The encounter diagnosis was Incarcerated ventral hernia.    The patient has an incarcerated ventral hernia that contains fatty tissue that is inflamed.  This is the source of her abdominal pain.  There is no bowel contained in the incarcerated hernia.  Her BMI is  57.1.  The details of a ventral hernia repair were discussed with her and specifically the increased complication rate based on her BMI.  Since her hernia does not contain bowel, she will try to manage conservatively and lose weight to reduce her complication rate.  The option of a bariatric referral was offered to her and she will consider this.  If her symptoms do not improve she will contact our office to proceed with a ventral hernia repair.

## 2019-02-28 RX ORDER — ESTRADIOL 0.1 MG/D
FILM, EXTENDED RELEASE TRANSDERMAL
Qty: 8 PATCH | Refills: 0 | Status: SHIPPED | OUTPATIENT
Start: 2019-02-28 | End: 2019-04-16 | Stop reason: SDUPTHER

## 2019-03-19 ENCOUNTER — TELEPHONE (OUTPATIENT)
Dept: SURGERY | Facility: CLINIC | Age: 49
End: 2019-03-19

## 2019-03-20 DIAGNOSIS — E66.01 MORBID OBESITY (HCC): Primary | ICD-10-CM

## 2019-04-16 RX ORDER — ESTRADIOL 0.1 MG/D
FILM, EXTENDED RELEASE TRANSDERMAL
Qty: 8 PATCH | Refills: 0 | Status: SHIPPED | OUTPATIENT
Start: 2019-04-16 | End: 2019-05-21 | Stop reason: SDUPTHER

## 2019-05-21 RX ORDER — ESTRADIOL 0.1 MG/D
FILM, EXTENDED RELEASE TRANSDERMAL
Qty: 8 PATCH | Refills: 0 | Status: SHIPPED | OUTPATIENT
Start: 2019-05-21 | End: 2019-06-19

## 2019-06-19 ENCOUNTER — HOSPITAL ENCOUNTER (OUTPATIENT)
Facility: HOSPITAL | Age: 49
Setting detail: OBSERVATION
Discharge: HOME | End: 2019-06-21
Attending: EMERGENCY MEDICINE | Admitting: SURGERY

## 2019-06-19 ENCOUNTER — APPOINTMENT (OUTPATIENT)
Dept: CT IMAGING | Facility: HOSPITAL | Age: 49
End: 2019-06-19

## 2019-06-19 DIAGNOSIS — K57.92 DIVERTICULITIS: Primary | ICD-10-CM

## 2019-06-19 DIAGNOSIS — K42.9 UMBILICAL HERNIA WITHOUT OBSTRUCTION AND WITHOUT GANGRENE: ICD-10-CM

## 2019-06-19 LAB
ALBUMIN SERPL-MCNC: 3.8 G/DL (ref 3.5–5.2)
ALBUMIN/GLOB SERPL: 1.2 G/DL
ALP SERPL-CCNC: 60 U/L (ref 39–117)
ALT SERPL W P-5'-P-CCNC: 19 U/L (ref 1–33)
ANION GAP SERPL CALCULATED.3IONS-SCNC: 11.8 MMOL/L
AST SERPL-CCNC: 16 U/L (ref 1–32)
BACTERIA UR QL AUTO: ABNORMAL /HPF
BASOPHILS # BLD AUTO: 0.05 10*3/MM3 (ref 0–0.2)
BASOPHILS NFR BLD AUTO: 0.4 % (ref 0–1.5)
BILIRUB SERPL-MCNC: 0.4 MG/DL (ref 0.2–1.2)
BILIRUB UR QL STRIP: NEGATIVE
BUN BLD-MCNC: 8 MG/DL (ref 6–20)
BUN/CREAT SERPL: 13.1 (ref 7–25)
CALCIUM SPEC-SCNC: 8.5 MG/DL (ref 8.6–10.5)
CHLORIDE SERPL-SCNC: 100 MMOL/L (ref 98–107)
CLARITY UR: CLEAR
CO2 SERPL-SCNC: 25.2 MMOL/L (ref 22–29)
COLOR UR: YELLOW
CREAT BLD-MCNC: 0.61 MG/DL (ref 0.57–1)
DEPRECATED RDW RBC AUTO: 45.8 FL (ref 37–54)
EOSINOPHIL # BLD AUTO: 0.19 10*3/MM3 (ref 0–0.4)
EOSINOPHIL NFR BLD AUTO: 1.7 % (ref 0.3–6.2)
ERYTHROCYTE [DISTWIDTH] IN BLOOD BY AUTOMATED COUNT: 12.8 % (ref 12.3–15.4)
GFR SERPL CREATININE-BSD FRML MDRD: 105 ML/MIN/1.73
GLOBULIN UR ELPH-MCNC: 3.2 GM/DL
GLUCOSE BLD-MCNC: 80 MG/DL (ref 65–99)
GLUCOSE UR STRIP-MCNC: NEGATIVE MG/DL
HCT VFR BLD AUTO: 42.6 % (ref 34–46.6)
HGB BLD-MCNC: 13.6 G/DL (ref 12–15.9)
HGB UR QL STRIP.AUTO: ABNORMAL
HOLD SPECIMEN: NORMAL
HOLD SPECIMEN: NORMAL
HYALINE CASTS UR QL AUTO: ABNORMAL /LPF
IMM GRANULOCYTES # BLD AUTO: 0.05 10*3/MM3 (ref 0–0.05)
IMM GRANULOCYTES NFR BLD AUTO: 0.4 % (ref 0–0.5)
KETONES UR QL STRIP: ABNORMAL
LEUKOCYTE ESTERASE UR QL STRIP.AUTO: NEGATIVE
LIPASE SERPL-CCNC: 20 U/L (ref 13–60)
LYMPHOCYTES # BLD AUTO: 2.6 10*3/MM3 (ref 0.7–3.1)
LYMPHOCYTES NFR BLD AUTO: 22.6 % (ref 19.6–45.3)
MCH RBC QN AUTO: 31.2 PG (ref 26.6–33)
MCHC RBC AUTO-ENTMCNC: 31.9 G/DL (ref 31.5–35.7)
MCV RBC AUTO: 97.7 FL (ref 79–97)
MONOCYTES # BLD AUTO: 0.87 10*3/MM3 (ref 0.1–0.9)
MONOCYTES NFR BLD AUTO: 7.6 % (ref 5–12)
NEUTROPHILS # BLD AUTO: 7.75 10*3/MM3 (ref 1.7–7)
NEUTROPHILS NFR BLD AUTO: 67.3 % (ref 42.7–76)
NITRITE UR QL STRIP: NEGATIVE
NRBC BLD AUTO-RTO: 0.1 /100 WBC (ref 0–0.2)
PH UR STRIP.AUTO: 6.5 [PH] (ref 5–8)
PLATELET # BLD AUTO: 282 10*3/MM3 (ref 140–450)
PMV BLD AUTO: 9.8 FL (ref 6–12)
POTASSIUM BLD-SCNC: 3.6 MMOL/L (ref 3.5–5.2)
PROT SERPL-MCNC: 7 G/DL (ref 6–8.5)
PROT UR QL STRIP: NEGATIVE
RBC # BLD AUTO: 4.36 10*6/MM3 (ref 3.77–5.28)
RBC # UR: ABNORMAL /HPF
REF LAB TEST METHOD: ABNORMAL
SODIUM BLD-SCNC: 137 MMOL/L (ref 136–145)
SP GR UR STRIP: 1.01 (ref 1–1.03)
SQUAMOUS #/AREA URNS HPF: ABNORMAL /HPF
UROBILINOGEN UR QL STRIP: ABNORMAL
WBC NRBC COR # BLD: 11.51 10*3/MM3 (ref 3.4–10.8)
WBC UR QL AUTO: ABNORMAL /HPF
WHOLE BLOOD HOLD SPECIMEN: NORMAL
WHOLE BLOOD HOLD SPECIMEN: NORMAL

## 2019-06-19 PROCEDURE — 96376 TX/PRO/DX INJ SAME DRUG ADON: CPT

## 2019-06-19 PROCEDURE — 83690 ASSAY OF LIPASE: CPT | Performed by: NURSE PRACTITIONER

## 2019-06-19 PROCEDURE — 25010000002 ONDANSETRON PER 1 MG: Performed by: NURSE PRACTITIONER

## 2019-06-19 PROCEDURE — 25010000002 MORPHINE PER 10 MG: Performed by: NURSE PRACTITIONER

## 2019-06-19 PROCEDURE — 25010000002 PIPERACILLIN SOD-TAZOBACTAM PER 1 G: Performed by: NURSE PRACTITIONER

## 2019-06-19 PROCEDURE — G0378 HOSPITAL OBSERVATION PER HR: HCPCS

## 2019-06-19 PROCEDURE — 80053 COMPREHEN METABOLIC PANEL: CPT | Performed by: NURSE PRACTITIONER

## 2019-06-19 PROCEDURE — 25010000002 IOPAMIDOL 61 % SOLUTION: Performed by: EMERGENCY MEDICINE

## 2019-06-19 PROCEDURE — 99219 PR INITIAL OBSERVATION CARE/DAY 50 MINUTES: CPT | Performed by: SURGERY

## 2019-06-19 PROCEDURE — 25010000002 PIPERACILLIN SOD-TAZOBACTAM PER 1 G: Performed by: SURGERY

## 2019-06-19 PROCEDURE — 96375 TX/PRO/DX INJ NEW DRUG ADDON: CPT

## 2019-06-19 PROCEDURE — 25010000002 HYDROMORPHONE PER 4 MG: Performed by: SURGERY

## 2019-06-19 PROCEDURE — 85025 COMPLETE CBC W/AUTO DIFF WBC: CPT | Performed by: NURSE PRACTITIONER

## 2019-06-19 PROCEDURE — 96365 THER/PROPH/DIAG IV INF INIT: CPT

## 2019-06-19 PROCEDURE — 99284 EMERGENCY DEPT VISIT MOD MDM: CPT

## 2019-06-19 PROCEDURE — 81001 URINALYSIS AUTO W/SCOPE: CPT | Performed by: NURSE PRACTITIONER

## 2019-06-19 PROCEDURE — 74177 CT ABD & PELVIS W/CONTRAST: CPT

## 2019-06-19 RX ORDER — NALOXONE HCL 0.4 MG/ML
0.4 VIAL (ML) INJECTION
Status: DISCONTINUED | OUTPATIENT
Start: 2019-06-19 | End: 2019-06-21 | Stop reason: HOSPADM

## 2019-06-19 RX ORDER — HYDROMORPHONE HYDROCHLORIDE 1 MG/ML
0.5 INJECTION, SOLUTION INTRAMUSCULAR; INTRAVENOUS; SUBCUTANEOUS
Status: DISCONTINUED | OUTPATIENT
Start: 2019-06-19 | End: 2019-06-21 | Stop reason: HOSPADM

## 2019-06-19 RX ORDER — FAMOTIDINE 10 MG/ML
20 INJECTION, SOLUTION INTRAVENOUS EVERY 12 HOURS SCHEDULED
Status: DISCONTINUED | OUTPATIENT
Start: 2019-06-19 | End: 2019-06-21 | Stop reason: HOSPADM

## 2019-06-19 RX ORDER — SODIUM CHLORIDE 0.9 % (FLUSH) 0.9 %
10 SYRINGE (ML) INJECTION AS NEEDED
Status: DISCONTINUED | OUTPATIENT
Start: 2019-06-19 | End: 2019-06-19

## 2019-06-19 RX ORDER — MORPHINE SULFATE 2 MG/ML
4 INJECTION, SOLUTION INTRAMUSCULAR; INTRAVENOUS ONCE
Status: COMPLETED | OUTPATIENT
Start: 2019-06-19 | End: 2019-06-19

## 2019-06-19 RX ORDER — SODIUM CHLORIDE 0.9 % (FLUSH) 0.9 %
3 SYRINGE (ML) INJECTION EVERY 12 HOURS SCHEDULED
Status: DISCONTINUED | OUTPATIENT
Start: 2019-06-19 | End: 2019-06-21 | Stop reason: HOSPADM

## 2019-06-19 RX ORDER — SODIUM CHLORIDE 0.9 % (FLUSH) 0.9 %
3-10 SYRINGE (ML) INJECTION AS NEEDED
Status: DISCONTINUED | OUTPATIENT
Start: 2019-06-19 | End: 2019-06-21 | Stop reason: HOSPADM

## 2019-06-19 RX ORDER — ONDANSETRON 2 MG/ML
4 INJECTION INTRAMUSCULAR; INTRAVENOUS EVERY 6 HOURS PRN
Status: DISCONTINUED | OUTPATIENT
Start: 2019-06-19 | End: 2019-06-21 | Stop reason: HOSPADM

## 2019-06-19 RX ORDER — ONDANSETRON 2 MG/ML
4 INJECTION INTRAMUSCULAR; INTRAVENOUS ONCE
Status: COMPLETED | OUTPATIENT
Start: 2019-06-19 | End: 2019-06-19

## 2019-06-19 RX ORDER — ESTRADIOL 0.1 MG/D
1 FILM, EXTENDED RELEASE TRANSDERMAL 2 TIMES WEEKLY
COMMUNITY
End: 2019-11-08 | Stop reason: SDUPTHER

## 2019-06-19 RX ADMIN — MORPHINE SULFATE 4 MG: 2 INJECTION, SOLUTION INTRAMUSCULAR; INTRAVENOUS at 18:11

## 2019-06-19 RX ADMIN — TAZOBACTAM SODIUM AND PIPERACILLIN SODIUM 3.38 G: 375; 3 INJECTION, SOLUTION INTRAVENOUS at 23:20

## 2019-06-19 RX ADMIN — FAMOTIDINE 20 MG: 10 INJECTION INTRAVENOUS at 23:20

## 2019-06-19 RX ADMIN — ONDANSETRON 4 MG: 2 INJECTION INTRAMUSCULAR; INTRAVENOUS at 13:37

## 2019-06-19 RX ADMIN — SODIUM CHLORIDE, PRESERVATIVE FREE 3 ML: 5 INJECTION INTRAVENOUS at 22:03

## 2019-06-19 RX ADMIN — TAZOBACTAM SODIUM AND PIPERACILLIN SODIUM 3.38 G: 375; 3 INJECTION, SOLUTION INTRAVENOUS at 17:48

## 2019-06-19 RX ADMIN — SODIUM CHLORIDE 1000 ML: 9 INJECTION, SOLUTION INTRAVENOUS at 13:35

## 2019-06-19 RX ADMIN — HYDROMORPHONE HYDROCHLORIDE 0.5 MG: 1 INJECTION, SOLUTION INTRAMUSCULAR; INTRAVENOUS; SUBCUTANEOUS at 22:03

## 2019-06-19 RX ADMIN — IOPAMIDOL 85 ML: 612 INJECTION, SOLUTION INTRAVENOUS at 14:58

## 2019-06-19 RX ADMIN — MORPHINE SULFATE 4 MG: 2 INJECTION, SOLUTION INTRAMUSCULAR; INTRAVENOUS at 13:37

## 2019-06-19 NOTE — PROGRESS NOTES
Clinical Pharmacy Services: Medication History    Cari Watson is a 48 y.o. female presenting to [facility] for   Chief Complaint   Patient presents with   • Abdominal Pain       She  has a past medical history of Diverticulosis, Fibrocystic breast, IBS (irritable bowel syndrome), and Sleep apnea.    Allergies as of 06/19/2019 - Reviewed 06/19/2019   Allergen Reaction Noted   • Shrimp (diagnostic)  07/14/2015   • Sulfa antibiotics Swelling 12/05/2012       Medication information was obtained from: Patient  Pharmacy and Phone Number: [facility] [phone]    Prior to Admission Medications     Prescriptions Last Dose Informant Patient Reported? Taking?    estradiol (MINIVELLE, VIVELLE-DOT) 0.1 MG/24HR patch  Self Yes Yes    Place 1 patch on the skin as directed by provider 2 (Two) Times a Week.            Medication notes: Percocet and Zofran removed per patient, therapy complete. Patient reports Estradiol is her only maintenance medication.    This medication list is complete to the best of my knowledge as of 6/19/2019    Please call if questions.    Lawanda Argueta, Medication History Technician  6/19/2019 6:10 PM

## 2019-06-19 NOTE — ED TRIAGE NOTES
Pt states that she was seen here in February for a hernia that has not been repaired.   States that her abdominal pain started yesterday.   States she has had diarrhea.   Hx of diverticulitis three weeks ago.

## 2019-06-19 NOTE — H&P
CHIEF COMPLAINT:    Diverticulitis    HISTORY OF PRESENT ILLNESS:    Cari Watson is a 48 y.o. female who has been experiencing pain in the left abdomen and suprapubic area for the last 48 hours.  Feels like her insides are raw.  It feels like she is being scraped with sandpaper from the inside.  She has experienced this sensation somewhat before during a prior episode of diverticulitis.  Previous episodes seem to have occurred lower in the abdomen.  On the Friday before Memorial Day weekend, she was started on antibiotics by her primary care provider for presumed diverticulitis.  At that time she was experiencing left lower quadrant and pelvic pain.  After about a week of antibiotics, she felt much better and was pain-free.  2 days ago, she started noticing marrow, pencil size stools.  She had 2 episodes of diarrhea yesterday.  Yesterday she noted increasing left-sided abdominal pain which was similar in quality to diverticulitis but was slightly higher than she is usually typically experienced.  There is subjective fever.  There is loss of appetite.    Past Medical History:   Diagnosis Date   • Diverticulosis    • Eczema    • Fibrocystic breast    • IBS (irritable bowel syndrome)    • Sleep apnea        Past Surgical History:   Procedure Laterality Date   • ANAL FISTULA REPAIR     • HYSTERECTOMY     • OOPHORECTOMY           Current Facility-Administered Medications:   •  famotidine (PEPCID) injection 20 mg, 20 mg, Intravenous, Q12H, Yaakov Dolan MD  •  HYDROmorphone (DILAUDID) injection 0.5 mg, 0.5 mg, Intravenous, Q2H PRN, 0.5 mg at 06/19/19 2203 **AND** naloxone (NARCAN) injection 0.4 mg, 0.4 mg, Intravenous, Q5 Min PRN, Yaakov Dolan MD  •  ondansetron (ZOFRAN) injection 4 mg, 4 mg, Intravenous, Q6H PRN, Yaakov Dolan MD  •  piperacillin-tazobactam (ZOSYN) 3.375 g in iso-osmotic dextrose 50 ml (premix), 3.375 g, Intravenous, Q8H, Yaakov Dolan MD  •  sodium chloride 0.9 % flush  "3 mL, 3 mL, Intravenous, Q12H, Yaakov Dolan MD, 3 mL at 06/19/19 2203  •  sodium chloride 0.9 % flush 3-10 mL, 3-10 mL, Intravenous, PRN, Yaakov Dolan MD    Allergies   Allergen Reactions   • Shrimp (Diagnostic)    • Soybean-Containing Drug Products Diarrhea   • Sulfa Antibiotics Swelling       Family History   Problem Relation Age of Onset   • Breast cancer Maternal Grandmother        Social History     Socioeconomic History   • Marital status:      Spouse name: Not on file   • Number of children: Not on file   • Years of education: Not on file   • Highest education level: Not on file   Tobacco Use   • Smoking status: Current Every Day Smoker   • Smokeless tobacco: Never Used   Substance and Sexual Activity   • Alcohol use: No     Frequency: Never   • Drug use: No       Review of Systems   Constitutional: Positive for appetite change, chills and fever.   HENT: Negative for trouble swallowing.    Eyes:        She requires corrective lenses for visual acuity.   Respiratory: Negative for shortness of breath.    Cardiovascular: Negative for chest pain.   Gastrointestinal: Positive for abdominal pain and diarrhea. Negative for blood in stool.        There has been a change in stool caliber.   Endocrine: Negative for polyuria.   Genitourinary: Negative for dysuria.   Neurological: Negative for syncope.   Psychiatric/Behavioral: Negative for confusion.       Objective     /81 (BP Location: Left arm, Patient Position: Lying)   Pulse 81   Temp 99.7 °F (37.6 °C) (Oral)   Resp 22   Ht 160 cm (63\")   Wt (!) 139 kg (307 lb 3.2 oz)   SpO2 98%   BMI 54.42 kg/m²     Physical Exam   Constitutional: She is oriented to person, place, and time. She appears well-developed and well-nourished. No distress.   Morbid obesity   HENT:   Head: Normocephalic and atraumatic.   Eyes: Conjunctivae are normal. No scleral icterus.   Cardiovascular: Normal rate, regular rhythm, normal heart sounds and intact " distal pulses.   No murmur heard.  Pulmonary/Chest: Effort normal and breath sounds normal. No respiratory distress. She has no wheezes. She has no rales.   Abdominal: Soft. Bowel sounds are normal. She exhibits no distension. There is tenderness. There is rebound and guarding.   Musculoskeletal: She exhibits no edema or deformity.   Neurological: She is alert and oriented to person, place, and time.   Skin: Skin is warm and dry.   Psychiatric: She has a normal mood and affect. Her behavior is normal.   Vitals reviewed.      DIAGNOSTIC DATA:    WBC 11.51, Hgb 13.6, platelets 282.  Creat 0.61, alk phos 60, ALT 19, AST 16, T bili 0.4.  Lipase 20.    I reviewed the images and the report of a CT scan of the abdomen and pelvis performed today.  There are diverticula in the descending and sigmoid colon.  There is inflammatory stranding surrounding the colon in the distal descending region and in the mid sigmoid region.  There are no abscesses.  There is no free air.  There is no free fluid.  There is an umbilical hernia containing fat.  Radiologist dictated fat necrosis, but I see only minimal stranding.    ASSESSMENT:    Acute diverticulitis-uncomplicated  Umbilical hernia containing fat  Morbid obesity    PLAN:    I recommended admission, and IV antibiotics.  She will be n.p.o. except for oral medications and few ice chips.  I will reassess in the morning and decide about continuation of IV antibiotics and dietary advancement depending upon laboratory evaluation and physical exam.

## 2019-06-19 NOTE — ED PROVIDER NOTES
" EMERGENCY DEPARTMENT ENCOUNTER    CHIEF COMPLAINT  Chief Complaint: Abdominal Pain   History given by: Patient   History limited by: none   Room Number: 37/37  PMD: Vipul Keene MD      HPI:  Pt is a 48 y.o. female who presents complaining of LLQ abd pain for the past 24 hours, radiating to RLQ and suprapubic abd. Per pt, she had two episodes of severe \"cramping\" pain that resolved, but dull pain remained. Pt confirms chills, subjective fever, constipation 2 days ago, and 2 episodes of diarrhea this AM. Pt denies any urinary symptoms. Pt states she was diagnosed with diverticulitis 2 weeks ago, was given abx with improvement of pain. Pt confirms hx of periumbilical hernia that has not been repaired, as well as hx of hysterectomy and tubal ligation. Pt affirms she had colonoscopy 3 years ago, without acute findings at the time. Pt denies hx of nephrolithiasis.     Duration:  24 hours   Onset: gradual   Timing: constant   Location: LLQ   Radiation: RLQ and suprapubic abd   Quality: pain   Intensity/Severity: mild to moderate   Progression: unchanged   Associated Symptoms: chills, fever, constipation, diarrhea   Aggravating Factors: none   Alleviating Factors: none   Previous Episodes: Pt affirms she has hx of diverticulitis   Treatment before arrival: Pt was on abx 2 weeks ago.     PAST MEDICAL HISTORY  Active Ambulatory Problems     Diagnosis Date Noted   • No Active Ambulatory Problems     Resolved Ambulatory Problems     Diagnosis Date Noted   • No Resolved Ambulatory Problems     Past Medical History:   Diagnosis Date   • Diverticulosis    • Fibrocystic breast    • IBS (irritable bowel syndrome)    • Sleep apnea        PAST SURGICAL HISTORY  Past Surgical History:   Procedure Laterality Date   • ANAL FISTULA REPAIR     • HYSTERECTOMY     • OOPHORECTOMY         FAMILY HISTORY  Family History   Problem Relation Age of Onset   • Breast cancer Maternal Grandmother        SOCIAL HISTORY  Social History "     Socioeconomic History   • Marital status:      Spouse name: Not on file   • Number of children: Not on file   • Years of education: Not on file   • Highest education level: Not on file   Tobacco Use   • Smoking status: Current Every Day Smoker   • Smokeless tobacco: Never Used   Substance and Sexual Activity   • Alcohol use: No     Frequency: Never   • Drug use: No       ALLERGIES  Shrimp (diagnostic) and Sulfa antibiotics    REVIEW OF SYSTEMS  Review of Systems   Constitutional: Positive for chills and fever (subjective).   HENT: Negative for sore throat.    Eyes: Negative.    Respiratory: Negative for cough and shortness of breath.    Cardiovascular: Negative for chest pain.   Gastrointestinal: Positive for abdominal pain (LLQ to suprapubic and RLQ), constipation and diarrhea. Negative for vomiting.   Genitourinary: Negative for dysuria.   Musculoskeletal: Negative for neck pain.   Skin: Negative for rash.   Allergic/Immunologic: Negative.    Neurological: Negative for weakness, numbness and headaches.   Hematological: Negative.    Psychiatric/Behavioral: Negative.    All other systems reviewed and are negative.      PHYSICAL EXAM  ED Triage Vitals [06/19/19 1220]   Temp Heart Rate Resp BP SpO2   98.3 °F (36.8 °C) 93 18 -- 98 %      Temp src Heart Rate Source Patient Position BP Location FiO2 (%)   -- -- -- -- --       Physical Exam   Constitutional: She is oriented to person, place, and time. No distress.   HENT:   Head: Normocephalic and atraumatic.   Eyes: EOM are normal. Pupils are equal, round, and reactive to light.   Neck: Normal range of motion. Neck supple.   Cardiovascular: Normal rate, regular rhythm and normal heart sounds.   Pulmonary/Chest: Effort normal and breath sounds normal. No respiratory distress.   Abdominal: Soft. There is tenderness in the left lower quadrant. There is no rebound, no guarding and no CVA tenderness. No hernia.   Musculoskeletal: Normal range of motion. She  exhibits no edema.   Neurological: She is alert and oriented to person, place, and time. She has normal sensation and normal strength.   Skin: Skin is warm and dry. No rash noted.   Psychiatric: Mood and affect normal.   Nursing note and vitals reviewed.      LAB RESULTS  Lab Results (last 24 hours)     Procedure Component Value Units Date/Time    CBC & Differential [[id number]] Collected:  06/19/19 1335    Specimen:  Blood Updated:  06/19/19 1410    Narrative:       The following orders were created for panel order CBC & Differential.  Procedure                               Abnormality         Status                     ---------                               -----------         ------                     CBC Auto Differential[[id number]]        Abnormal            Final result                 Please view results for these tests on the individual orders.    Comprehensive Metabolic Panel [[id number]]  (Abnormal) Collected:  06/19/19 1335    Specimen:  Blood Updated:  06/19/19 1431     Glucose 80 mg/dL      BUN 8 mg/dL      Creatinine 0.61 mg/dL      Sodium 137 mmol/L      Potassium 3.6 mmol/L      Chloride 100 mmol/L      CO2 25.2 mmol/L      Calcium 8.5 mg/dL      Total Protein 7.0 g/dL      Albumin 3.80 g/dL      ALT (SGPT) 19 U/L      AST (SGOT) 16 U/L      Alkaline Phosphatase 60 U/L      Total Bilirubin 0.4 mg/dL      eGFR Non African Amer 105 mL/min/1.73      Globulin 3.2 gm/dL      A/G Ratio 1.2 g/dL      BUN/Creatinine Ratio 13.1     Anion Gap 11.8 mmol/L     Narrative:       GFR Normal >60  Chronic Kidney Disease <60  Kidney Failure <15    Lipase [[id number]]  (Normal) Collected:  06/19/19 1335    Specimen:  Blood Updated:  06/19/19 1431     Lipase 20 U/L     CBC Auto Differential [[id number]]  (Abnormal) Collected:  06/19/19 1335    Specimen:  Blood Updated:  06/19/19 1410     WBC 11.51 10*3/mm3      RBC 4.36 10*6/mm3      Hemoglobin 13.6 g/dL      Hematocrit 42.6 %      MCV 97.7 fL      MCH 31.2 pg      MCHC  31.9 g/dL      RDW 12.8 %      RDW-SD 45.8 fl      MPV 9.8 fL      Platelets 282 10*3/mm3      Neutrophil % 67.3 %      Lymphocyte % 22.6 %      Monocyte % 7.6 %      Eosinophil % 1.7 %      Basophil % 0.4 %      Immature Grans % 0.4 %      Neutrophils, Absolute 7.75 10*3/mm3      Lymphocytes, Absolute 2.60 10*3/mm3      Monocytes, Absolute 0.87 10*3/mm3      Eosinophils, Absolute 0.19 10*3/mm3      Basophils, Absolute 0.05 10*3/mm3      Immature Grans, Absolute 0.05 10*3/mm3      nRBC 0.1 /100 WBC     Urinalysis With Microscopic If Indicated (No Culture) - Urine, Clean Catch [[id number]]  (Abnormal) Collected:  06/19/19 1348    Specimen:  Urine, Clean Catch Updated:  06/19/19 1414     Color, UA Yellow     Appearance, UA Clear     pH, UA 6.5     Specific Gravity, UA 1.010     Glucose, UA Negative     Ketones, UA Trace     Bilirubin, UA Negative     Blood, UA Small (1+)     Protein, UA Negative     Leuk Esterase, UA Negative     Nitrite, UA Negative     Urobilinogen, UA 0.2 E.U./dL    Urinalysis, Microscopic Only - Urine, Clean Catch [[id number]]  (Abnormal) Collected:  06/19/19 1348    Specimen:  Urine, Clean Catch Updated:  06/19/19 1414     RBC, UA 3-5 /HPF      WBC, UA 0-2 /HPF      Bacteria, UA None Seen /HPF      Squamous Epithelial Cells, UA 0-2 /HPF      Hyaline Casts, UA None Seen /LPF      Methodology Automated Microscopy          I ordered the above labs and reviewed the results    RADIOLOGY  CT Abdomen Pelvis With Contrast   Preliminary Result   1. CT findings of acute diverticulitis distal left colon. Second area of   diverticulitis is seen within the sigmoid colon as described above.   2. Fat stranding suggestive of necrosis seen within the anterior omentum   and herniated fat within the supraumbilical fat-containing hernia. This   likely represents sequela from incarceration as demonstrated on prior CT   from 02/2019.   3. There is evidence of supracervical hysterectomy with suggestion of   diffuse  circumferential thickening of the cervix. Follow-up with   gynecological consult may be appropriate.       These findings were discussed with Libby Morales by telephone.       Radiation dose reduction techniques were utilized, including automated   exposure control and exposure modulation based on body size.                   I ordered the above noted radiological studies. Interpreted by radiologist. Discussed with radiologist (Beka). Reviewed by me in PACS.       PROCEDURES  Procedures      PROGRESS AND CONSULTS  ED Course as of Jun 19 2025 Wed Jun 19, 2019   1518 Dr Ireland: Acute diverticulitis in 2 segments, the distal colon and sigmoid. Umbilical hernia previously seen on ct with fat incarceration, now evidence of fat necrosis.     [JS]      ED Course User Index  [JS] Libby Morales, APRN     1255: Labs ordered for further evaluation.     1304: CT Abd/Pelvis ordered for further rule out. Morphine and Zofran ordered for pain management.     1335: Discussed pt's case with Dr. Parmar, who, after bedside evaluation, agrees with plan for care.     1536: Discussed case further with Dr. Parmar who agreed with plan to discuss case with Surgery.     1537: Call placed to Surgery.     1559: Pt rechecked and still complaining of pain. Discussed results of labs and CT scan with evidence of incarcerated hernia and diverticulitis. Informed pt of plan for better pain control as well as consult with surgery for probable admission and abx treatment. Pt states she has seen Dr. Vince Chacko for surgery in the past.     1718: Discussed pt's case with Dr. Bowles (Surgery) who agreed to admit pt to Med/Surg for abx treatment and consult.     1719: Zosyn ordered for abx treatment.     1750: Pt rechecked and complaining of further pain. Informed pt of plan to give further pain medication with admission, with plan for admission to Dr. Bowles and consult for possible discussion of surgical repair. Pt understands and  agrees with the plan, all questions answered.    1751: Morphine ordered for pain management.   MEDICAL DECISION MAKING  Results were reviewed/discussed with the patient and they were also made aware of online access. Pt also made aware that some labs, such as cultures, will not be resulted during ER visit and follow up with PMD is necessary.     MDM  Number of Diagnoses or Management Options     Amount and/or Complexity of Data Reviewed  Clinical lab tests: ordered and reviewed (WBC - 11.51)  Tests in the radiology section of CPT®: ordered and reviewed (CT - acute diverticulitis in 2 segments, distal and sigmoid colon. Umbilical hernia with fat necrosis )  Discussion of test results with the performing providers: yes (Dr. Ireland (radiology))  Discuss the patient with other providers: yes (Dr. Bowles (surgery))           DIAGNOSIS  Final diagnoses:   Diverticulitis   Umbilical hernia without obstruction and without gangrene       DISPOSITION  ADMISSION    Discussed treatment plan and reason for admission with pt/family and admitting physician.  Pt/family voiced understanding of the plan for admission for further testing/treatment as needed.         Latest Documented Vital Signs:  As of 8:25 PM  BP- 120/81 HR- 72 Temp- 98.3 °F (36.8 °C) O2 sat- 96%    --  Documentation assistance provided by geovanna Luna for JOSE Frankel.  Information recorded by the geovanna was done at my direction and has been verified and validated by me.                 Aileen Luna  06/19/19 1754       Libby Morales APRN  06/19/19 2025

## 2019-06-19 NOTE — ED PROVIDER NOTES
"MD ATTESTATION NOTE    Pt presents to the ED complaining of \"dull and sometimes sharp\" L flank pain radiating into her LLQ abd worsened with movement and bumps in the car on the way here. Also /o subjective fever, chills, nausea, diarrhea x2 today. Hx of diverticulitis, was treated for it 3 weeks ago w/ abx. Says her pain is higher up than it normally is w/ diverticulitis. Also has hx of periumbilical hernia that has not been repaired post hysterectomy and tubal ligation.    Reviewed pt's lab and imaging studies, including RBC's in UA.     On exam:    GENERAL: not distressed  CV: regular rhythm, regular rate, no murmur  RESPIRATORY: normal effort, CTAB  ABDOMEN: soft, nml active bowel sounds, umbilical hernia that is easily reducible, moderate tenderness to RUQ and mild tenderness in the LUQ  MUSCULOSKELETAL: no deformity  NEURO: alert, ALEGRIA, FC  SKIN: warm, dry    I agree with the plan to CT Abd and check labs for further evaluation.     1536- CT Abd shows recurrent diverticulitis. I agree w/ plan to consult surgery.    The REINIER and I have discussed this patient's history, physical exam, and treatment plan.  I have reviewed the documentation and personally had a face to face interaction with the patient. I affirm the documentation and agree with the treatment and plan.  The attached note describes my personal findings.    Documentation assistance provided by geovanna Marks for Dr. Parmar.  Information recorded by the scribe was done at my direction and has been verified and validated by me.     Earlene Marks  06/19/19 1537       Junior Parmar MD  06/19/19 2035    "

## 2019-06-20 LAB
ALBUMIN SERPL-MCNC: 3.6 G/DL (ref 3.5–5.2)
ALBUMIN/GLOB SERPL: 1.1 G/DL
ALP SERPL-CCNC: 60 U/L (ref 39–117)
ALT SERPL W P-5'-P-CCNC: 19 U/L (ref 1–33)
ANION GAP SERPL CALCULATED.3IONS-SCNC: 11.6 MMOL/L
AST SERPL-CCNC: 14 U/L (ref 1–32)
BILIRUB SERPL-MCNC: 0.6 MG/DL (ref 0.2–1.2)
BUN BLD-MCNC: 7 MG/DL (ref 6–20)
BUN/CREAT SERPL: 10 (ref 7–25)
CALCIUM SPEC-SCNC: 8.3 MG/DL (ref 8.6–10.5)
CHLORIDE SERPL-SCNC: 100 MMOL/L (ref 98–107)
CO2 SERPL-SCNC: 23.4 MMOL/L (ref 22–29)
CREAT BLD-MCNC: 0.7 MG/DL (ref 0.57–1)
DEPRECATED RDW RBC AUTO: 47.5 FL (ref 37–54)
ERYTHROCYTE [DISTWIDTH] IN BLOOD BY AUTOMATED COUNT: 12.8 % (ref 12.3–15.4)
GFR SERPL CREATININE-BSD FRML MDRD: 89 ML/MIN/1.73
GLOBULIN UR ELPH-MCNC: 3.3 GM/DL
GLUCOSE BLD-MCNC: 92 MG/DL (ref 65–99)
HCT VFR BLD AUTO: 42.1 % (ref 34–46.6)
HGB BLD-MCNC: 13.3 G/DL (ref 12–15.9)
MCH RBC QN AUTO: 31.7 PG (ref 26.6–33)
MCHC RBC AUTO-ENTMCNC: 31.6 G/DL (ref 31.5–35.7)
MCV RBC AUTO: 100.2 FL (ref 79–97)
PLATELET # BLD AUTO: 271 10*3/MM3 (ref 140–450)
PMV BLD AUTO: 9.9 FL (ref 6–12)
POTASSIUM BLD-SCNC: 3.8 MMOL/L (ref 3.5–5.2)
PROT SERPL-MCNC: 6.9 G/DL (ref 6–8.5)
RBC # BLD AUTO: 4.2 10*6/MM3 (ref 3.77–5.28)
SODIUM BLD-SCNC: 135 MMOL/L (ref 136–145)
WBC NRBC COR # BLD: 12.09 10*3/MM3 (ref 3.4–10.8)

## 2019-06-20 PROCEDURE — 25010000002 HYDROMORPHONE PER 4 MG: Performed by: SURGERY

## 2019-06-20 PROCEDURE — 80053 COMPREHEN METABOLIC PANEL: CPT | Performed by: SURGERY

## 2019-06-20 PROCEDURE — 96366 THER/PROPH/DIAG IV INF ADDON: CPT

## 2019-06-20 PROCEDURE — 96376 TX/PRO/DX INJ SAME DRUG ADON: CPT

## 2019-06-20 PROCEDURE — 99225 PR SBSQ OBSERVATION CARE/DAY 25 MINUTES: CPT | Performed by: SURGERY

## 2019-06-20 PROCEDURE — G0378 HOSPITAL OBSERVATION PER HR: HCPCS

## 2019-06-20 PROCEDURE — 25010000002 PIPERACILLIN SOD-TAZOBACTAM PER 1 G: Performed by: SURGERY

## 2019-06-20 PROCEDURE — 25010000002 ONDANSETRON PER 1 MG: Performed by: SURGERY

## 2019-06-20 PROCEDURE — 85027 COMPLETE CBC AUTOMATED: CPT | Performed by: SURGERY

## 2019-06-20 RX ADMIN — HYDROMORPHONE HYDROCHLORIDE 0.5 MG: 1 INJECTION, SOLUTION INTRAMUSCULAR; INTRAVENOUS; SUBCUTANEOUS at 15:47

## 2019-06-20 RX ADMIN — TAZOBACTAM SODIUM AND PIPERACILLIN SODIUM 3.38 G: 375; 3 INJECTION, SOLUTION INTRAVENOUS at 06:45

## 2019-06-20 RX ADMIN — FAMOTIDINE 20 MG: 10 INJECTION INTRAVENOUS at 20:40

## 2019-06-20 RX ADMIN — FAMOTIDINE 20 MG: 10 INJECTION INTRAVENOUS at 08:49

## 2019-06-20 RX ADMIN — TAZOBACTAM SODIUM AND PIPERACILLIN SODIUM 3.38 G: 375; 3 INJECTION, SOLUTION INTRAVENOUS at 15:06

## 2019-06-20 RX ADMIN — TAZOBACTAM SODIUM AND PIPERACILLIN SODIUM 3.38 G: 375; 3 INJECTION, SOLUTION INTRAVENOUS at 23:57

## 2019-06-20 RX ADMIN — HYDROMORPHONE HYDROCHLORIDE 0.5 MG: 1 INJECTION, SOLUTION INTRAMUSCULAR; INTRAVENOUS; SUBCUTANEOUS at 20:34

## 2019-06-20 RX ADMIN — ONDANSETRON HYDROCHLORIDE 4 MG: 2 SOLUTION INTRAMUSCULAR; INTRAVENOUS at 11:33

## 2019-06-20 RX ADMIN — HYDROMORPHONE HYDROCHLORIDE 0.5 MG: 1 INJECTION, SOLUTION INTRAMUSCULAR; INTRAVENOUS; SUBCUTANEOUS at 06:11

## 2019-06-20 RX ADMIN — SODIUM CHLORIDE, PRESERVATIVE FREE 3 ML: 5 INJECTION INTRAVENOUS at 20:38

## 2019-06-20 RX ADMIN — ONDANSETRON HYDROCHLORIDE 4 MG: 2 SOLUTION INTRAMUSCULAR; INTRAVENOUS at 20:34

## 2019-06-20 RX ADMIN — HYDROMORPHONE HYDROCHLORIDE 0.5 MG: 1 INJECTION, SOLUTION INTRAMUSCULAR; INTRAVENOUS; SUBCUTANEOUS at 10:26

## 2019-06-20 NOTE — PLAN OF CARE
Problem: Patient Care Overview  Goal: Plan of Care Review  Outcome: Ongoing (interventions implemented as appropriate)   06/20/19 0519   Coping/Psychosocial   Plan of Care Reviewed With patient   Plan of Care Review   Progress no change   OTHER   Outcome Summary vss. Zosyn IV given. Dilaudid IV given x1. labs ordered this am     Goal: Individualization and Mutuality  Outcome: Ongoing (interventions implemented as appropriate)    Goal: Discharge Needs Assessment  Outcome: Ongoing (interventions implemented as appropriate)    Goal: Interprofessional Rounds/Family Conf  Outcome: Ongoing (interventions implemented as appropriate)      Problem: Pain, Acute (Adult)  Goal: Identify Related Risk Factors and Signs and Symptoms  Outcome: Outcome(s) achieved Date Met: 06/20/19    Goal: Acceptable Pain Control/Comfort Level  Outcome: Ongoing (interventions implemented as appropriate)

## 2019-06-21 VITALS
DIASTOLIC BLOOD PRESSURE: 66 MMHG | HEART RATE: 61 BPM | BODY MASS INDEX: 51.91 KG/M2 | WEIGHT: 293 LBS | SYSTOLIC BLOOD PRESSURE: 101 MMHG | TEMPERATURE: 97.7 F | RESPIRATION RATE: 18 BRPM | OXYGEN SATURATION: 92 % | HEIGHT: 63 IN

## 2019-06-21 LAB
DEPRECATED RDW RBC AUTO: 45.2 FL (ref 37–54)
ERYTHROCYTE [DISTWIDTH] IN BLOOD BY AUTOMATED COUNT: 12.6 % (ref 12.3–15.4)
HCT VFR BLD AUTO: 40.2 % (ref 34–46.6)
HGB BLD-MCNC: 12.9 G/DL (ref 12–15.9)
MCH RBC QN AUTO: 31.6 PG (ref 26.6–33)
MCHC RBC AUTO-ENTMCNC: 32.1 G/DL (ref 31.5–35.7)
MCV RBC AUTO: 98.5 FL (ref 79–97)
PLATELET # BLD AUTO: 260 10*3/MM3 (ref 140–450)
PMV BLD AUTO: 10.3 FL (ref 6–12)
RBC # BLD AUTO: 4.08 10*6/MM3 (ref 3.77–5.28)
WBC NRBC COR # BLD: 9.14 10*3/MM3 (ref 3.4–10.8)

## 2019-06-21 PROCEDURE — 85027 COMPLETE CBC AUTOMATED: CPT | Performed by: SURGERY

## 2019-06-21 PROCEDURE — 96366 THER/PROPH/DIAG IV INF ADDON: CPT

## 2019-06-21 PROCEDURE — 25010000002 ONDANSETRON PER 1 MG: Performed by: SURGERY

## 2019-06-21 PROCEDURE — 96376 TX/PRO/DX INJ SAME DRUG ADON: CPT

## 2019-06-21 PROCEDURE — 25010000002 HYDROMORPHONE PER 4 MG: Performed by: SURGERY

## 2019-06-21 PROCEDURE — 25010000002 PIPERACILLIN SOD-TAZOBACTAM PER 1 G: Performed by: SURGERY

## 2019-06-21 PROCEDURE — G0378 HOSPITAL OBSERVATION PER HR: HCPCS

## 2019-06-21 RX ORDER — HYDROCODONE BITARTRATE AND ACETAMINOPHEN 10; 325 MG/1; MG/1
1 TABLET ORAL EVERY 8 HOURS PRN
Status: DISCONTINUED | OUTPATIENT
Start: 2019-06-21 | End: 2019-06-21 | Stop reason: HOSPADM

## 2019-06-21 RX ORDER — AMOXICILLIN AND CLAVULANATE POTASSIUM 875; 125 MG/1; MG/1
1 TABLET, FILM COATED ORAL EVERY 12 HOURS SCHEDULED
Status: DISCONTINUED | OUTPATIENT
Start: 2019-06-21 | End: 2019-06-21 | Stop reason: HOSPADM

## 2019-06-21 RX ORDER — ONDANSETRON 4 MG/1
4 TABLET, FILM COATED ORAL DAILY PRN
Qty: 30 TABLET | Refills: 1 | Status: ON HOLD | OUTPATIENT
Start: 2019-06-21 | End: 2019-11-17

## 2019-06-21 RX ORDER — HYDROCODONE BITARTRATE AND ACETAMINOPHEN 10; 325 MG/1; MG/1
1 TABLET ORAL EVERY 8 HOURS PRN
Qty: 12 TABLET | Refills: 0 | Status: SHIPPED | OUTPATIENT
Start: 2019-06-21 | End: 2019-06-24

## 2019-06-21 RX ORDER — FLUCONAZOLE 100 MG/1
100 TABLET ORAL EVERY 24 HOURS
Qty: 14 TABLET | Refills: 0 | Status: SHIPPED | OUTPATIENT
Start: 2019-06-21 | End: 2019-07-05

## 2019-06-21 RX ORDER — FLUCONAZOLE 100 MG/1
100 TABLET ORAL EVERY 24 HOURS
Status: DISCONTINUED | OUTPATIENT
Start: 2019-06-21 | End: 2019-06-21 | Stop reason: HOSPADM

## 2019-06-21 RX ORDER — AMOXICILLIN AND CLAVULANATE POTASSIUM 875; 125 MG/1; MG/1
1 TABLET, FILM COATED ORAL EVERY 12 HOURS SCHEDULED
Qty: 24 TABLET | Refills: 0 | Status: SHIPPED | OUTPATIENT
Start: 2019-06-21 | End: 2019-07-03

## 2019-06-21 RX ADMIN — HYDROMORPHONE HYDROCHLORIDE 0.5 MG: 1 INJECTION, SOLUTION INTRAMUSCULAR; INTRAVENOUS; SUBCUTANEOUS at 09:42

## 2019-06-21 RX ADMIN — ONDANSETRON HYDROCHLORIDE 4 MG: 2 SOLUTION INTRAMUSCULAR; INTRAVENOUS at 09:42

## 2019-06-21 RX ADMIN — TAZOBACTAM SODIUM AND PIPERACILLIN SODIUM 3.38 G: 375; 3 INJECTION, SOLUTION INTRAVENOUS at 06:35

## 2019-06-21 RX ADMIN — HYDROMORPHONE HYDROCHLORIDE 0.5 MG: 1 INJECTION, SOLUTION INTRAMUSCULAR; INTRAVENOUS; SUBCUTANEOUS at 06:38

## 2019-06-21 RX ADMIN — FAMOTIDINE 20 MG: 10 INJECTION INTRAVENOUS at 09:42

## 2019-06-21 RX ADMIN — HYDROMORPHONE HYDROCHLORIDE 0.5 MG: 1 INJECTION, SOLUTION INTRAMUSCULAR; INTRAVENOUS; SUBCUTANEOUS at 00:04

## 2019-06-21 NOTE — PROGRESS NOTES
Doing better  Pain is better  Tolerated liquids  WBC is normal  Change to Augmentin  RX also for Norco for diverticulitis pain and Zofran for nausea

## 2019-06-21 NOTE — PROGRESS NOTES
LOS: 0 days     Chief Complaint:     Diverticulitis    Interval History:     Pain is slightly reduced    Medication Review:     famotidine 20 mg Intravenous Q12H   piperacillin-tazobactam 3.375 g Intravenous Q8H   sodium chloride 3 mL Intravenous Q12H        Objective     Vital Signs:  Temp:  [97.3 °F (36.3 °C)-99.7 °F (37.6 °C)] 98.7 °F (37.1 °C)  Heart Rate:  [68-81] 68  Resp:  [18-22] 18  BP: ()/(61-76) 106/67    Intake/Output Summary (Last 24 hours) at 6/20/2019 2123  Last data filed at 6/20/2019 2119  Gross per 24 hour   Intake 450 ml   Output 1450 ml   Net -1000 ml       Physical Exam   Constitutional: She is oriented to person, place, and time. She appears well-developed and well-nourished. No distress.   HENT:   Head: Normocephalic and atraumatic.   Eyes: Conjunctivae are normal. No scleral icterus.   Cardiovascular: Normal rate, regular rhythm, normal heart sounds and intact distal pulses.   No murmur heard.  Pulmonary/Chest: Effort normal and breath sounds normal. No respiratory distress. She has no wheezes. She has no rales.   Abdominal: Soft. Bowel sounds are normal. She exhibits no distension. There is tenderness (left abdominal tenderness is improved). There is no guarding.   Musculoskeletal: She exhibits no edema or deformity.   Neurological: She is alert and oriented to person, place, and time.   Skin: Skin is warm and dry.   Psychiatric: She has a normal mood and affect.   Vitals reviewed.      Results Review:    Results from last 7 days   Lab Units 06/20/19  0643 06/19/19  1335   SODIUM mmol/L 135* 137   POTASSIUM mmol/L 3.8 3.6   CHLORIDE mmol/L 100 100   CO2 mmol/L 23.4 25.2   BUN mg/dL 7 8   CREATININE mg/dL 0.70 0.61   GLUCOSE mg/dL 92 80   CALCIUM mg/dL 8.3* 8.5*     Results from last 7 days   Lab Units 06/20/19  0643 06/19/19  1335   WBC 10*3/mm3 12.09* 11.51*   HEMOGLOBIN g/dL 13.3 13.6   HEMATOCRIT % 42.1 42.6   PLATELETS 10*3/mm3 271 282        Assessment:    Diverticulitis  Umbilical hernia    Plan:    Continue IV antibiotics until leukocytosis is resolved.  Allow clear liquid diet today    Yaakov Dolan MD  06/20/19  9:23 PM

## 2019-06-21 NOTE — PLAN OF CARE
Problem: Patient Care Overview  Goal: Plan of Care Review  Outcome: Ongoing (interventions implemented as appropriate)   06/21/19 0521   Coping/Psychosocial   Plan of Care Reviewed With patient   Plan of Care Review   Progress improving   OTHER   Outcome Summary occassional abdominal pain and nausea, medicated, slept well during the night, same IV antibiotics continued, doing well     Goal: Individualization and Mutuality  Outcome: Ongoing (interventions implemented as appropriate)    Goal: Discharge Needs Assessment  Outcome: Ongoing (interventions implemented as appropriate)      Problem: Pain, Acute (Adult)  Goal: Acceptable Pain Control/Comfort Level  Outcome: Ongoing (interventions implemented as appropriate)

## 2019-06-24 ENCOUNTER — OFFICE VISIT (OUTPATIENT)
Dept: SLEEP MEDICINE | Facility: HOSPITAL | Age: 49
End: 2019-06-24

## 2019-06-24 VITALS
WEIGHT: 293 LBS | HEIGHT: 62 IN | OXYGEN SATURATION: 96 % | BODY MASS INDEX: 53.92 KG/M2 | HEART RATE: 71 BPM | SYSTOLIC BLOOD PRESSURE: 145 MMHG | DIASTOLIC BLOOD PRESSURE: 84 MMHG

## 2019-06-24 DIAGNOSIS — G47.33 OSA ON CPAP: Primary | ICD-10-CM

## 2019-06-24 DIAGNOSIS — G47.10 HYPERSOMNIA: ICD-10-CM

## 2019-06-24 DIAGNOSIS — E66.01 MORBID OBESITY (HCC): ICD-10-CM

## 2019-06-24 DIAGNOSIS — Z99.89 OSA ON CPAP: Primary | ICD-10-CM

## 2019-06-24 PROCEDURE — G0463 HOSPITAL OUTPT CLINIC VISIT: HCPCS

## 2019-06-24 NOTE — PROGRESS NOTES
Case Management Discharge Note    Final Note: home; self-care. Betsy Eli CSW     Destination      No service has been selected for the patient.      Durable Medical Equipment      No service has been selected for the patient.      Dialysis/Infusion      No service has been selected for the patient.      Home Medical Care      No service has been selected for the patient.      Therapy      No service has been selected for the patient.      Community Resources      No service has been selected for the patient.             Final Discharge Disposition Code: 01 - home or self-care

## 2019-06-24 NOTE — PROGRESS NOTES
[facility]  Telephone: [phone] / Fax: [phone] [facility]  Telephone: [phone] / Fax: [phone] Maris Pollard    Referring Physician: Vipul Keene MD  PCP: Vipul Keene MD    Reason for consult:  sleep apnea    Cari Watson is a 48 y.o.female  was seen in the [facility] today for evaluation of sleep apnea. She was last seen at the sleep lab in 2009 by Dr. Willis. She was diagnosed with JR in 2009-AHI 15 overall, supine AHI 28. She has been on CPAP ever since. She gained 50lbs since her JR was diagnosed. She noticed worsening in sleep quality since her weight gain. She does not sleep as well as she used to in the past. She sleeps more on her stomach and uses nasal pillows. She does not wake up gasping or choking while using her machine.  Her current machine is from [employer]. Her sleeps schedule is 9pm-6am. She wakes up feeling tired in the morning despite using her machine at pressure of 12cm.       SH- works as , current smoker since age 14, drinks alcohol 4 times per year, 2 caffeine per day, no drugs.    ROS- + ear pain, +hoarseness, +nasal congestion, + post nasal drip, +swollen joints/ankles, +cough, rash. Rest is negative.    Cari Watson  has a past medical history of Diverticulosis, Eczema, Fibrocystic breast, IBS (irritable bowel syndrome), and Sleep apnea.    Current Medications:    Current Outpatient Medications:   •  amoxicillin-clavulanate (AUGMENTIN) 875-125 MG per tablet, Take 1 tablet by mouth Every 12 (Twelve) Hours for 24 doses., Disp: 24 tablet, Rfl: 0  •  estradiol (MINIVELLE, VIVELLE-DOT) 0.1 MG/24HR patch, Place 1 patch on the skin as directed by provider 2 (Two) Times a Week., Disp: , Rfl:   •  fluconazole (DIFLUCAN) 100 MG tablet, Take 1 tablet by mouth Daily for 14 doses., Disp: 14 tablet, Rfl: 0  •  HYDROcodone-acetaminophen (NORCO)  MG per tablet, Take 1 tablet by mouth Every 8 (Eight) Hours As  "Needed for Moderate Pain  for up to 3 days., Disp: 12 tablet, Rfl: 0  •  ondansetron (ZOFRAN) 4 MG tablet, Take 1 tablet by mouth Daily As Needed for Nausea or Vomiting., Disp: 30 tablet, Rfl: 1    I have reviewed Past Medical History, Past Surgical History, Medication List, Social History and Family History as entered in Sleep Questionnaire and EPIC.    ESS  10   Vital Signs /84   Pulse 71   Ht 157.5 cm (62\")   Wt (!) 138 kg (304 lb 6.4 oz)   SpO2 96%   BMI 55.68 kg/m²  Body mass index is 55.68 kg/m².    General Alert and oriented. No acute distress noted   Pharynx/Throat Class IV Mallampati airway, large tongue, no evidence of redundant lateral pharyngeal tissue. No oral lesions. No thrush. Moist mucous membranes.   Head Normocephalic. Symmetrical. Atraumatic.    Nose No septal deviation. No drainage   Chest Wall Normal shape. Symmetric expansion with respiration. No tenderness.   Neck Trachea midline, no thyromegaly or adenopathy    Lungs Clear to auscultation bilaterally. No wheezes. No rhonchi. No rales. Respirations regular, even and unlabored.   Heart Regular rhythm and normal rate. Normal S1 and S2. No murmur   Abdomen Soft, non-tender and non-distended. Normal bowel sounds. No masses.   Extremities Moves all extremities well. No edema   Psychiatric Normal mood and affect.        Diagnostic Testing     PSG 2009- AHI 15, supine AHI 28     Download dates-3/26/19-6/23/% use with average nightly use of 10 hours and 34 minutes on CPAP 12cm with residual AHI 1.5.    Impression:  1. JR on CPAP    2. Hypersomnia    3. Morbid obesity (CMS/[facility])          Plan:  I will increase CPAP to 14cm in view of subjective feeling of hypersomnia despite using CPAP.  I will order her a new machine from [employer] with fixed pressure of 14cm as her current machine is 10 years old and is likely approaching the end of its motor life.  I discussed the pathophysiology of obstructive sleep apnea with the patient.  We " discussed the adverse outcomes associated with untreated sleep-disordered breathing. Weight loss will be beneficial as hypersomnia is likely made worse excess weight. If hypersomnia remains an issue on higher pressures/new machine, consider CPAP titration study(though she would hope to avoid it for now).     Thank you for allowing me to participate in your patient's care.    The patient will follow up with Dr. Elise in 6 weeks to evaluate response to new CPAP therapy.    JOSE Loera  [facility]  Phone: [phone]      Part of this note may be an electronic transcription/translation of spoken language to printed text using the Dragon Dictation System. Some errors may exist even though the document was edited.

## 2019-07-30 ENCOUNTER — TELEPHONE (OUTPATIENT)
Dept: SLEEP MEDICINE | Facility: HOSPITAL | Age: 49
End: 2019-07-30

## 2019-07-30 NOTE — TELEPHONE ENCOUNTER
Earlene from [facility] called stating they had just set up patient on new CPAP. The patient did not like new CPAP pressure settings and wants them returned to previous settings

## 2019-09-27 ENCOUNTER — OFFICE VISIT (OUTPATIENT)
Dept: SLEEP MEDICINE | Facility: HOSPITAL | Age: 49
End: 2019-09-27

## 2019-09-27 VITALS
BODY MASS INDEX: 53.92 KG/M2 | HEART RATE: 102 BPM | WEIGHT: 293 LBS | OXYGEN SATURATION: 96 % | DIASTOLIC BLOOD PRESSURE: 74 MMHG | SYSTOLIC BLOOD PRESSURE: 135 MMHG | HEIGHT: 62 IN

## 2019-09-27 DIAGNOSIS — E66.01 OBESITY, MORBID, BMI 40.0-49.9 (HCC): ICD-10-CM

## 2019-09-27 DIAGNOSIS — Z72.0 TOBACCO ABUSE: ICD-10-CM

## 2019-09-27 DIAGNOSIS — G47.33 OBSTRUCTIVE SLEEP APNEA: Primary | ICD-10-CM

## 2019-09-27 PROCEDURE — G0463 HOSPITAL OUTPT CLINIC VISIT: HCPCS

## 2019-09-27 NOTE — PROGRESS NOTES
"[facility]  [address] [facility]  3rd Floor  [place] [address]  [phone]    PCP: Vipul Keene MD    Reason for visit:  Sleep disorders: JR    Cari is a 49 y.o.female who was seen in the [facility] today. She got a new updated machine and is compliant. She sleeps from 8p to 6a. She uses nasal pillows, fits well. No air leaks or dry mouth. She smokes 1ppd x 30 yrs.  [place] Sleepiness Scale is 7. Caffeine 6 per day. Alcohol 0 per week.    Cari  reports that she has been smoking.  She has never used smokeless tobacco.    Pertinent Positive Review of Systems of post nasal drip, soa, cough, abdominal bloating, ear pain  Rest of Review of Systems was negative as recorded in Sleep Questionnaire.    Patient  has a past medical history of Diverticulosis, Eczema, Fibrocystic breast, IBS (irritable bowel syndrome), and Sleep apnea.     Current Medications:    Current Outpatient Medications:   •  estradiol (MINIVELLE, VIVELLE-DOT) 0.1 MG/24HR patch, Place 1 patch on the skin as directed by provider 2 (Two) Times a Week., Disp: , Rfl:   •  ondansetron (ZOFRAN) 4 MG tablet, Take 1 tablet by mouth Daily As Needed for Nausea or Vomiting., Disp: 30 tablet, Rfl: 1   also entered in Sleep Questionnaire         Vital Signs: /74   Pulse 102   Ht 157.5 cm (62\")   Wt (!) 143 kg (316 lb)   SpO2 96%   BMI 57.80 kg/m²     Body mass index is 57.8 kg/m².       Tongue: normal      Dentition: good       Pharynx: Posterior pharyngeal pillars are wide   Mallampatti: IV (only hard palate visible)        General: Alert. Cooperative. Well developed. No acute distress.             Head:  Normocephalic. Symmetrical. Atraumatic.              Nose: No septal deviation. No drainage.          Throat: No oral lesions. No thrush. Moist mucous membranes.    Chest Wall:  Normal shape. Symmetric expansion with respiration. No tenderness.             Neck:  Trachea midline.           Lungs: "  Clear to auscultation bilaterally. No wheezes. No rhonchi. No rales. Respirations regular, even and unlabored.            Heart:  Regular rhythm and normal rate. Normal S1 and S2. No murmur.     Abdomen:  Soft, non-tender and non-distended. Normal bowel sounds. No masses.  Extremities:  Moves all extremities well. No edema.    Psychiatric: Normal mood and affect.    Study:   PSG 2009- AHI 15, supine AHI 28    Testing:  · Compliance since set up is excellent with average usage 10 hours 52 minutes AHI 0.3 with set pressure 12 cm    DME Company: [employer]    Impression:  1. Obstructive sleep apnea    2. Obesity, morbid, BMI 40.0-49.9 (CMS/[facility])    3. Tobacco abuse        Plan:  Cari is compliant and benefits from use of the machine.  She wakes up rested and refreshed.  Current mask is comfortable.    She continues to smoke half to 1 pack of cigarettes a day.  We discussed smoking cessation.  She was advised to use nicotine replacement but prefers to go cold turkey.  I again discussed the importance of quitting smoking.    I reiterated the importance of effective treatment of obstructive sleep apnea with PAP machine.  Cardiovascular health risks of untreated sleep apnea were again reviewed.  Patient was asked to remain cautious if there is persistent hypersomnolence. The benefit of weight loss in reducing severity of obstructive sleep apnea was discussed.  Patient would benefit from adhering to a strict diet to achieve ideal BMI.     Change of PAP supplies regularly is important for effective use.  Change of cushion on the mask or plugs on nasal pillows along with disposable filters once every month and change of mask frame, tubing, headgear and Velcro straps every 6 months at the minimum was reiterated.    This patient is compliant with PAP machine and benefits from its use.  Apnea hypopneas index is corrected/improved.  Daytime hypersomnolence has resolved.     Patient will follow up in this clinic in 1 year  JOSE    Thank you for allowing me to participate in your patient's care.    Fernando Elise MD    Part of this note may be an electronic transcription/translation of spoken language to printed text using the Dragon Dictation System.

## 2019-11-08 ENCOUNTER — OFFICE VISIT (OUTPATIENT)
Dept: OBSTETRICS AND GYNECOLOGY | Facility: CLINIC | Age: 49
End: 2019-11-08

## 2019-11-08 VITALS
DIASTOLIC BLOOD PRESSURE: 88 MMHG | SYSTOLIC BLOOD PRESSURE: 120 MMHG | HEIGHT: 62 IN | WEIGHT: 293 LBS | BODY MASS INDEX: 53.92 KG/M2

## 2019-11-08 DIAGNOSIS — E66.01 MORBID OBESITY WITH BMI OF 50.0-59.9, ADULT (HCC): ICD-10-CM

## 2019-11-08 DIAGNOSIS — Z79.890 HORMONE REPLACEMENT THERAPY (HRT): ICD-10-CM

## 2019-11-08 DIAGNOSIS — Z78.0 MENOPAUSE: ICD-10-CM

## 2019-11-08 DIAGNOSIS — Z01.419 ENCOUNTER FOR GYNECOLOGICAL EXAMINATION WITHOUT ABNORMAL FINDING: Primary | ICD-10-CM

## 2019-11-08 PROCEDURE — 99396 PREV VISIT EST AGE 40-64: CPT | Performed by: OBSTETRICS & GYNECOLOGY

## 2019-11-08 RX ORDER — ESTRADIOL 0.1 MG/D
1 FILM, EXTENDED RELEASE TRANSDERMAL 2 TIMES WEEKLY
Qty: 24 PATCH | Refills: 4 | Status: SHIPPED | OUTPATIENT
Start: 2019-11-11 | End: 2021-01-06 | Stop reason: SDUPTHER

## 2019-11-08 NOTE — PROGRESS NOTES
Subjective    Cari Watson is a 49 y.o. female for annual gyn exam    History of Present Illness   49-year-old postmenopausal white female status post supracervical hysterectomy and bilateral salpingo-oophorectomy in 2015 presents for routine gynecologic exam.  She denies vasomotor symptoms or vaginal bleeding.  She has been maintained on hormone replacement therapy and is doing well.  She would like to continue this.  She is current on her screening mammograms and colonoscopies.  There is a family history of breast cancer in her maternal grandmother.  She has no gynecologic complaints.      The following portions of the patient's history were reviewed and updated as appropriate: allergies, current medications, past family history, past medical history, past social history, past surgical history and problem list.      Review of Systems   Constitutional: Negative for activity change, appetite change, fatigue and unexpected weight change.   HENT: Negative.    Eyes: Negative.    Respiratory: Negative.    Cardiovascular: Negative.    Gastrointestinal: Negative for abdominal distention, abdominal pain, anal bleeding, constipation, diarrhea, nausea and vomiting.   Endocrine: Negative for cold intolerance, heat intolerance, polydipsia, polyphagia and polyuria.   Genitourinary: Negative for difficulty urinating, dysuria, flank pain, frequency, hematuria, pelvic pain, urgency, vaginal bleeding and vaginal discharge.   Musculoskeletal: Negative.    Skin: Negative.    Allergic/Immunologic: Negative.    Neurological: Negative.    Hematological: Negative.    Psychiatric/Behavioral: Negative.            Physical Exam   Constitutional: She is oriented to person, place, and time. Vital signs are normal. She appears well-developed and well-nourished. She is cooperative.   She is morbidly obese with a BMI of 57.2 weighing 313 pounds.   HENT:   Head: Normocephalic.   Eyes: Conjunctivae are normal. Pupils are equal, round, and  reactive to light.   Neck: Normal range of motion. Neck supple. No tracheal deviation present. No thyromegaly present.   Cardiovascular: Normal rate, regular rhythm and normal heart sounds. Exam reveals no gallop and no friction rub.   No murmur heard.  Pulmonary/Chest: Effort normal and breath sounds normal. No respiratory distress. She has no wheezes. Right breast exhibits no inverted nipple, no mass, no nipple discharge, no skin change and no tenderness. Left breast exhibits no inverted nipple, no mass, no nipple discharge, no skin change and no tenderness. Breasts are symmetrical. There is no breast swelling.   Abdominal: Soft. Bowel sounds are normal. She exhibits no distension, no ascites and no mass. There is no hepatosplenomegaly. There is no tenderness. There is no rebound, no guarding and no CVA tenderness. No hernia. Hernia confirmed negative in the right inguinal area and confirmed negative in the left inguinal area.   Genitourinary: Rectal exam shows no fissure, no mass, no tenderness and anal tone normal. Pelvic exam was performed with patient supine. No labial fusion. There is no rash, tenderness, lesion or injury on the right labia. There is no rash, tenderness, lesion or injury on the left labia. Cervix exhibits no motion tenderness, no discharge and no friability. No erythema, tenderness or bleeding in the vagina. No foreign body in the vagina. No signs of injury around the vagina. No vaginal discharge found.   Musculoskeletal: Normal range of motion. She exhibits no edema or deformity.   Lymphadenopathy:     She has no cervical adenopathy.        Right: No inguinal adenopathy present.        Left: No inguinal adenopathy present.   Neurological: She is alert and oriented to person, place, and time. She has normal reflexes. No cranial nerve deficit. Coordination normal.   Skin: Skin is warm and dry. No rash noted. No erythema.   Psychiatric: She has a normal mood and affect. Her behavior is normal.          Cari was seen today for gynecologic exam.    Diagnoses and all orders for this visit:    Encounter for gynecological examination without abnormal finding  -     Pap IG, Rfx HPV ASCU    Menopause  -     estradiol (MINIVELLE, VIVELLE-DOT) 0.1 MG/24HR patch; Place 1 patch on the skin as directed by provider 2 (Two) Times a Week.    Hormone replacement therapy (HRT)  -     estradiol (MINIVELLE, VIVELLE-DOT) 0.1 MG/24HR patch; Place 1 patch on the skin as directed by provider 2 (Two) Times a Week.    Morbid obesity with BMI of 50.0-59.9, adult (CMS/[facility])    The patient is doing well from a gynecologic standpoint.  I encouraged nutritional supplementation and weightbearing exercise for bone health.  Annual exams and mammograms were recommended.

## 2019-11-12 LAB
CONV .: NORMAL
CYTOLOGIST CVX/VAG CYTO: NORMAL
CYTOLOGY CVX/VAG DOC CYTO: NORMAL
CYTOLOGY CVX/VAG DOC THIN PREP: NORMAL
DX ICD CODE: NORMAL
HIV 1 & 2 AB SER-IMP: NORMAL
OTHER STN SPEC: NORMAL
STAT OF ADQ CVX/VAG CYTO-IMP: NORMAL

## 2019-11-17 ENCOUNTER — APPOINTMENT (OUTPATIENT)
Dept: CT IMAGING | Facility: HOSPITAL | Age: 49
End: 2019-11-17

## 2019-11-17 ENCOUNTER — HOSPITAL ENCOUNTER (INPATIENT)
Facility: HOSPITAL | Age: 49
LOS: 3 days | Discharge: HOME | End: 2019-11-20
Attending: EMERGENCY MEDICINE | Admitting: SURGERY

## 2019-11-17 DIAGNOSIS — K57.92 DIVERTICULITIS: Primary | ICD-10-CM

## 2019-11-17 LAB
ALBUMIN SERPL-MCNC: 4 G/DL (ref 3.5–5.2)
ALBUMIN/GLOB SERPL: 1.2 G/DL
ALP SERPL-CCNC: 66 U/L (ref 39–117)
ALT SERPL W P-5'-P-CCNC: 20 U/L (ref 1–33)
ANION GAP SERPL CALCULATED.3IONS-SCNC: 10.3 MMOL/L (ref 5–15)
AST SERPL-CCNC: 13 U/L (ref 1–32)
BACTERIA UR QL AUTO: ABNORMAL /HPF
BASOPHILS # BLD AUTO: 0.07 10*3/MM3 (ref 0–0.2)
BASOPHILS NFR BLD AUTO: 0.5 % (ref 0–1.5)
BILIRUB SERPL-MCNC: 0.3 MG/DL (ref 0.2–1.2)
BILIRUB UR QL STRIP: NEGATIVE
BUN BLD-MCNC: 8 MG/DL (ref 6–20)
BUN/CREAT SERPL: 11.8 (ref 7–25)
CALCIUM SPEC-SCNC: 8.9 MG/DL (ref 8.6–10.5)
CHLORIDE SERPL-SCNC: 101 MMOL/L (ref 98–107)
CLARITY UR: CLEAR
CO2 SERPL-SCNC: 26.7 MMOL/L (ref 22–29)
COLOR UR: YELLOW
CREAT BLD-MCNC: 0.68 MG/DL (ref 0.57–1)
DEPRECATED RDW RBC AUTO: 43.9 FL (ref 37–54)
EOSINOPHIL # BLD AUTO: 0.39 10*3/MM3 (ref 0–0.4)
EOSINOPHIL NFR BLD AUTO: 2.7 % (ref 0.3–6.2)
ERYTHROCYTE [DISTWIDTH] IN BLOOD BY AUTOMATED COUNT: 12.7 % (ref 12.3–15.4)
GFR SERPL CREATININE-BSD FRML MDRD: 92 ML/MIN/1.73
GLOBULIN UR ELPH-MCNC: 3.3 GM/DL
GLUCOSE BLD-MCNC: 98 MG/DL (ref 65–99)
GLUCOSE UR STRIP-MCNC: NEGATIVE MG/DL
HCT VFR BLD AUTO: 37.9 % (ref 34–46.6)
HGB BLD-MCNC: 13 G/DL (ref 12–15.9)
HGB UR QL STRIP.AUTO: ABNORMAL
HYALINE CASTS UR QL AUTO: ABNORMAL /LPF
IMM GRANULOCYTES # BLD AUTO: 0.09 10*3/MM3 (ref 0–0.05)
IMM GRANULOCYTES NFR BLD AUTO: 0.6 % (ref 0–0.5)
KETONES UR QL STRIP: NEGATIVE
LEUKOCYTE ESTERASE UR QL STRIP.AUTO: NEGATIVE
LIPASE SERPL-CCNC: 17 U/L (ref 13–60)
LYMPHOCYTES # BLD AUTO: 3.19 10*3/MM3 (ref 0.7–3.1)
LYMPHOCYTES NFR BLD AUTO: 22 % (ref 19.6–45.3)
MCH RBC QN AUTO: 32.4 PG (ref 26.6–33)
MCHC RBC AUTO-ENTMCNC: 34.3 G/DL (ref 31.5–35.7)
MCV RBC AUTO: 94.5 FL (ref 79–97)
MONOCYTES # BLD AUTO: 1.03 10*3/MM3 (ref 0.1–0.9)
MONOCYTES NFR BLD AUTO: 7.1 % (ref 5–12)
NEUTROPHILS # BLD AUTO: 9.72 10*3/MM3 (ref 1.7–7)
NEUTROPHILS NFR BLD AUTO: 67.1 % (ref 42.7–76)
NITRITE UR QL STRIP: NEGATIVE
NRBC BLD AUTO-RTO: 0 /100 WBC (ref 0–0.2)
PH UR STRIP.AUTO: 6 [PH] (ref 5–8)
PLATELET # BLD AUTO: 302 10*3/MM3 (ref 140–450)
PMV BLD AUTO: 8.6 FL (ref 6–12)
POTASSIUM BLD-SCNC: 3.6 MMOL/L (ref 3.5–5.2)
PROT SERPL-MCNC: 7.3 G/DL (ref 6–8.5)
PROT UR QL STRIP: NEGATIVE
RBC # BLD AUTO: 4.01 10*6/MM3 (ref 3.77–5.28)
RBC # UR: ABNORMAL /HPF
REF LAB TEST METHOD: ABNORMAL
SODIUM BLD-SCNC: 138 MMOL/L (ref 136–145)
SP GR UR STRIP: 1.01 (ref 1–1.03)
SQUAMOUS #/AREA URNS HPF: ABNORMAL /HPF
UROBILINOGEN UR QL STRIP: ABNORMAL
WBC NRBC COR # BLD: 14.49 10*3/MM3 (ref 3.4–10.8)
WBC UR QL AUTO: ABNORMAL /HPF

## 2019-11-17 PROCEDURE — 25010000002 IOPAMIDOL 61 % SOLUTION: Performed by: EMERGENCY MEDICINE

## 2019-11-17 PROCEDURE — 74177 CT ABD & PELVIS W/CONTRAST: CPT

## 2019-11-17 PROCEDURE — 25010000002 MORPHINE PER 10 MG: Performed by: SURGERY

## 2019-11-17 PROCEDURE — 25010000002 HYDROMORPHONE PER 4 MG: Performed by: EMERGENCY MEDICINE

## 2019-11-17 PROCEDURE — 25010000002 PIPERACILLIN SOD-TAZOBACTAM PER 1 G: Performed by: EMERGENCY MEDICINE

## 2019-11-17 PROCEDURE — 99284 EMERGENCY DEPT VISIT MOD MDM: CPT

## 2019-11-17 PROCEDURE — 99222 1ST HOSP IP/OBS MODERATE 55: CPT | Performed by: SURGERY

## 2019-11-17 PROCEDURE — 25010000002 ONDANSETRON PER 1 MG: Performed by: EMERGENCY MEDICINE

## 2019-11-17 PROCEDURE — 81001 URINALYSIS AUTO W/SCOPE: CPT | Performed by: EMERGENCY MEDICINE

## 2019-11-17 PROCEDURE — 80053 COMPREHEN METABOLIC PANEL: CPT | Performed by: EMERGENCY MEDICINE

## 2019-11-17 PROCEDURE — 85025 COMPLETE CBC W/AUTO DIFF WBC: CPT | Performed by: EMERGENCY MEDICINE

## 2019-11-17 PROCEDURE — 83690 ASSAY OF LIPASE: CPT | Performed by: EMERGENCY MEDICINE

## 2019-11-17 RX ORDER — ONDANSETRON 4 MG/1
4 TABLET, FILM COATED ORAL EVERY 6 HOURS PRN
Status: DISCONTINUED | OUTPATIENT
Start: 2019-11-17 | End: 2019-11-20 | Stop reason: HOSPADM

## 2019-11-17 RX ORDER — ACETAMINOPHEN 325 MG/1
650 TABLET ORAL EVERY 4 HOURS PRN
Status: DISCONTINUED | OUTPATIENT
Start: 2019-11-17 | End: 2019-11-20 | Stop reason: HOSPADM

## 2019-11-17 RX ORDER — HYDROMORPHONE HYDROCHLORIDE 1 MG/ML
0.5 INJECTION, SOLUTION INTRAMUSCULAR; INTRAVENOUS; SUBCUTANEOUS ONCE
Status: COMPLETED | OUTPATIENT
Start: 2019-11-17 | End: 2019-11-17

## 2019-11-17 RX ORDER — HYDROCODONE BITARTRATE AND ACETAMINOPHEN 5; 325 MG/1; MG/1
1 TABLET ORAL EVERY 4 HOURS PRN
Status: DISCONTINUED | OUTPATIENT
Start: 2019-11-17 | End: 2019-11-20 | Stop reason: HOSPADM

## 2019-11-17 RX ORDER — MORPHINE SULFATE 2 MG/ML
1 INJECTION, SOLUTION INTRAMUSCULAR; INTRAVENOUS EVERY 4 HOURS PRN
Status: DISCONTINUED | OUTPATIENT
Start: 2019-11-17 | End: 2019-11-20 | Stop reason: HOSPADM

## 2019-11-17 RX ORDER — SODIUM CHLORIDE 0.9 % (FLUSH) 0.9 %
10 SYRINGE (ML) INJECTION EVERY 12 HOURS SCHEDULED
Status: DISCONTINUED | OUTPATIENT
Start: 2019-11-17 | End: 2019-11-20 | Stop reason: HOSPADM

## 2019-11-17 RX ORDER — SODIUM CHLORIDE 0.9 % (FLUSH) 0.9 %
10 SYRINGE (ML) INJECTION AS NEEDED
Status: DISCONTINUED | OUTPATIENT
Start: 2019-11-17 | End: 2019-11-20 | Stop reason: HOSPADM

## 2019-11-17 RX ORDER — NALOXONE HCL 0.4 MG/ML
0.4 VIAL (ML) INJECTION
Status: DISCONTINUED | OUTPATIENT
Start: 2019-11-17 | End: 2019-11-20 | Stop reason: HOSPADM

## 2019-11-17 RX ORDER — ACETAMINOPHEN 650 MG/1
650 SUPPOSITORY RECTAL EVERY 4 HOURS PRN
Status: DISCONTINUED | OUTPATIENT
Start: 2019-11-17 | End: 2019-11-20 | Stop reason: HOSPADM

## 2019-11-17 RX ORDER — HYDROMORPHONE HYDROCHLORIDE 1 MG/ML
0.5 INJECTION, SOLUTION INTRAMUSCULAR; INTRAVENOUS; SUBCUTANEOUS
Status: DISCONTINUED | OUTPATIENT
Start: 2019-11-17 | End: 2019-11-20 | Stop reason: HOSPADM

## 2019-11-17 RX ORDER — OXYCODONE AND ACETAMINOPHEN 10; 325 MG/1; MG/1
1 TABLET ORAL EVERY 4 HOURS PRN
Status: DISCONTINUED | OUTPATIENT
Start: 2019-11-17 | End: 2019-11-20 | Stop reason: HOSPADM

## 2019-11-17 RX ORDER — ONDANSETRON 2 MG/ML
4 INJECTION INTRAMUSCULAR; INTRAVENOUS ONCE
Status: COMPLETED | OUTPATIENT
Start: 2019-11-17 | End: 2019-11-17

## 2019-11-17 RX ORDER — ACETAMINOPHEN 160 MG/5ML
650 SOLUTION ORAL EVERY 4 HOURS PRN
Status: DISCONTINUED | OUTPATIENT
Start: 2019-11-17 | End: 2019-11-20 | Stop reason: HOSPADM

## 2019-11-17 RX ORDER — ONDANSETRON 2 MG/ML
4 INJECTION INTRAMUSCULAR; INTRAVENOUS EVERY 6 HOURS PRN
Status: DISCONTINUED | OUTPATIENT
Start: 2019-11-17 | End: 2019-11-20 | Stop reason: HOSPADM

## 2019-11-17 RX ORDER — PROMETHAZINE HYDROCHLORIDE 25 MG/ML
12.5 INJECTION, SOLUTION INTRAMUSCULAR; INTRAVENOUS EVERY 4 HOURS PRN
Status: DISCONTINUED | OUTPATIENT
Start: 2019-11-17 | End: 2019-11-20 | Stop reason: HOSPADM

## 2019-11-17 RX ADMIN — TAZOBACTAM SODIUM AND PIPERACILLIN SODIUM 4.5 G: 500; 4 INJECTION, SOLUTION INTRAVENOUS at 21:06

## 2019-11-17 RX ADMIN — SODIUM CHLORIDE 1000 ML: 9 INJECTION, SOLUTION INTRAVENOUS at 19:28

## 2019-11-17 RX ADMIN — HYDROMORPHONE HYDROCHLORIDE 0.5 MG: 1 INJECTION, SOLUTION INTRAMUSCULAR; INTRAVENOUS; SUBCUTANEOUS at 21:06

## 2019-11-17 RX ADMIN — SODIUM CHLORIDE, PRESERVATIVE FREE 10 ML: 5 INJECTION INTRAVENOUS at 21:10

## 2019-11-17 RX ADMIN — SODIUM CHLORIDE, PRESERVATIVE FREE 10 ML: 5 INJECTION INTRAVENOUS at 22:53

## 2019-11-17 RX ADMIN — IOPAMIDOL 85 ML: 612 INJECTION, SOLUTION INTRAVENOUS at 20:21

## 2019-11-17 RX ADMIN — ONDANSETRON 4 MG: 2 INJECTION INTRAMUSCULAR; INTRAVENOUS at 21:06

## 2019-11-17 RX ADMIN — ONDANSETRON 4 MG: 2 INJECTION INTRAMUSCULAR; INTRAVENOUS at 19:25

## 2019-11-17 RX ADMIN — ACETAMINOPHEN 650 MG: 325 TABLET, FILM COATED ORAL at 22:52

## 2019-11-17 RX ADMIN — MORPHINE SULFATE 1 MG: 2 INJECTION, SOLUTION INTRAMUSCULAR; INTRAVENOUS at 22:52

## 2019-11-17 RX ADMIN — HYDROMORPHONE HYDROCHLORIDE 0.5 MG: 1 INJECTION, SOLUTION INTRAMUSCULAR; INTRAVENOUS; SUBCUTANEOUS at 19:25

## 2019-11-17 NOTE — ED TRIAGE NOTES
Patient presents to er via private vehicle from home.  Patient is reporting generalized abdominal pain with diarrhea.

## 2019-11-18 LAB
ALBUMIN SERPL-MCNC: 3.6 G/DL (ref 3.5–5.2)
ALBUMIN/GLOB SERPL: 1 G/DL
ALP SERPL-CCNC: 64 U/L (ref 39–117)
ALT SERPL W P-5'-P-CCNC: 17 U/L (ref 1–33)
ANION GAP SERPL CALCULATED.3IONS-SCNC: 12.8 MMOL/L (ref 5–15)
AST SERPL-CCNC: 18 U/L (ref 1–32)
BASOPHILS # BLD AUTO: 0.04 10*3/MM3 (ref 0–0.2)
BASOPHILS NFR BLD AUTO: 0.3 % (ref 0–1.5)
BILIRUB SERPL-MCNC: 0.5 MG/DL (ref 0.2–1.2)
BUN BLD-MCNC: 7 MG/DL (ref 6–20)
BUN/CREAT SERPL: 9.6 (ref 7–25)
CALCIUM SPEC-SCNC: 8.6 MG/DL (ref 8.6–10.5)
CHLORIDE SERPL-SCNC: 102 MMOL/L (ref 98–107)
CO2 SERPL-SCNC: 27.2 MMOL/L (ref 22–29)
CREAT BLD-MCNC: 0.73 MG/DL (ref 0.57–1)
DEPRECATED RDW RBC AUTO: 44.4 FL (ref 37–54)
EOSINOPHIL # BLD AUTO: 0.42 10*3/MM3 (ref 0–0.4)
EOSINOPHIL NFR BLD AUTO: 3.2 % (ref 0.3–6.2)
ERYTHROCYTE [DISTWIDTH] IN BLOOD BY AUTOMATED COUNT: 12.7 % (ref 12.3–15.4)
GFR SERPL CREATININE-BSD FRML MDRD: 85 ML/MIN/1.73
GLOBULIN UR ELPH-MCNC: 3.6 GM/DL
GLUCOSE BLD-MCNC: 94 MG/DL (ref 65–99)
HCT VFR BLD AUTO: 37.7 % (ref 34–46.6)
HGB BLD-MCNC: 12.5 G/DL (ref 12–15.9)
IMM GRANULOCYTES # BLD AUTO: 0.07 10*3/MM3 (ref 0–0.05)
IMM GRANULOCYTES NFR BLD AUTO: 0.5 % (ref 0–0.5)
LYMPHOCYTES # BLD AUTO: 3.19 10*3/MM3 (ref 0.7–3.1)
LYMPHOCYTES NFR BLD AUTO: 24.5 % (ref 19.6–45.3)
MCH RBC QN AUTO: 31.4 PG (ref 26.6–33)
MCHC RBC AUTO-ENTMCNC: 33.2 G/DL (ref 31.5–35.7)
MCV RBC AUTO: 94.7 FL (ref 79–97)
MONOCYTES # BLD AUTO: 0.97 10*3/MM3 (ref 0.1–0.9)
MONOCYTES NFR BLD AUTO: 7.5 % (ref 5–12)
NEUTROPHILS # BLD AUTO: 8.31 10*3/MM3 (ref 1.7–7)
NEUTROPHILS NFR BLD AUTO: 64 % (ref 42.7–76)
NRBC BLD AUTO-RTO: 0 /100 WBC (ref 0–0.2)
PLATELET # BLD AUTO: 299 10*3/MM3 (ref 140–450)
PMV BLD AUTO: 9.7 FL (ref 6–12)
POTASSIUM BLD-SCNC: 4.1 MMOL/L (ref 3.5–5.2)
PROT SERPL-MCNC: 7.2 G/DL (ref 6–8.5)
RBC # BLD AUTO: 3.98 10*6/MM3 (ref 3.77–5.28)
SODIUM BLD-SCNC: 142 MMOL/L (ref 136–145)
WBC NRBC COR # BLD: 13 10*3/MM3 (ref 3.4–10.8)

## 2019-11-18 PROCEDURE — 25010000002 MORPHINE PER 10 MG: Performed by: SURGERY

## 2019-11-18 PROCEDURE — 85025 COMPLETE CBC W/AUTO DIFF WBC: CPT | Performed by: SURGERY

## 2019-11-18 PROCEDURE — 25010000002 PIPERACILLIN SOD-TAZOBACTAM PER 1 G: Performed by: SURGERY

## 2019-11-18 PROCEDURE — 94640 AIRWAY INHALATION TREATMENT: CPT

## 2019-11-18 PROCEDURE — 99232 SBSQ HOSP IP/OBS MODERATE 35: CPT | Performed by: SURGERY

## 2019-11-18 PROCEDURE — 80053 COMPREHEN METABOLIC PANEL: CPT | Performed by: SURGERY

## 2019-11-18 PROCEDURE — 94799 UNLISTED PULMONARY SVC/PX: CPT

## 2019-11-18 PROCEDURE — 25010000002 HYDROMORPHONE PER 4 MG: Performed by: SURGERY

## 2019-11-18 PROCEDURE — 36415 COLL VENOUS BLD VENIPUNCTURE: CPT | Performed by: SURGERY

## 2019-11-18 RX ORDER — ALBUTEROL SULFATE 2.5 MG/3ML
2.5 SOLUTION RESPIRATORY (INHALATION) EVERY 6 HOURS PRN
Status: DISCONTINUED | OUTPATIENT
Start: 2019-11-18 | End: 2019-11-19

## 2019-11-18 RX ADMIN — HYDROMORPHONE HYDROCHLORIDE 0.5 MG: 1 INJECTION, SOLUTION INTRAMUSCULAR; INTRAVENOUS; SUBCUTANEOUS at 20:48

## 2019-11-18 RX ADMIN — TAZOBACTAM SODIUM AND PIPERACILLIN SODIUM 3.38 G: 375; 3 INJECTION, SOLUTION INTRAVENOUS at 03:36

## 2019-11-18 RX ADMIN — ALBUTEROL SULFATE 2.5 MG: 2.5 SOLUTION RESPIRATORY (INHALATION) at 04:07

## 2019-11-18 RX ADMIN — ALBUTEROL SULFATE 2.5 MG: 2.5 SOLUTION RESPIRATORY (INHALATION) at 08:31

## 2019-11-18 RX ADMIN — MORPHINE SULFATE 1 MG: 2 INJECTION, SOLUTION INTRAMUSCULAR; INTRAVENOUS at 04:23

## 2019-11-18 RX ADMIN — HYDROCODONE BITARTRATE AND ACETAMINOPHEN 1 TABLET: 5; 325 TABLET ORAL at 17:37

## 2019-11-18 RX ADMIN — SODIUM CHLORIDE, PRESERVATIVE FREE 10 ML: 5 INJECTION INTRAVENOUS at 08:45

## 2019-11-18 RX ADMIN — MORPHINE SULFATE 1 MG: 2 INJECTION, SOLUTION INTRAMUSCULAR; INTRAVENOUS at 08:53

## 2019-11-18 RX ADMIN — TAZOBACTAM SODIUM AND PIPERACILLIN SODIUM 3.38 G: 375; 3 INJECTION, SOLUTION INTRAVENOUS at 10:53

## 2019-11-18 RX ADMIN — MORPHINE SULFATE 1 MG: 2 INJECTION, SOLUTION INTRAMUSCULAR; INTRAVENOUS at 13:36

## 2019-11-18 RX ADMIN — ALBUTEROL SULFATE 2.5 MG: 2.5 SOLUTION RESPIRATORY (INHALATION) at 21:34

## 2019-11-18 RX ADMIN — ALBUTEROL SULFATE 2.5 MG: 2.5 SOLUTION RESPIRATORY (INHALATION) at 15:48

## 2019-11-18 RX ADMIN — TAZOBACTAM SODIUM AND PIPERACILLIN SODIUM 3.38 G: 375; 3 INJECTION, SOLUTION INTRAVENOUS at 19:03

## 2019-11-18 NOTE — PLAN OF CARE
Problem: Patient Care Overview  Goal: Plan of Care Review  Outcome: Ongoing (interventions implemented as appropriate)   11/18/19 1410   OTHER   Outcome Summary vss, morphine for abd. pain, remains on clears , iv anitbx cont.        Problem: Pain, Acute (Adult)  Goal: Identify Related Risk Factors and Signs and Symptoms  Outcome: Ongoing (interventions implemented as appropriate)    Goal: Acceptable Pain Control/Comfort Level  Outcome: Ongoing (interventions implemented as appropriate)

## 2019-11-18 NOTE — H&P
SURGERY  Date of Visit: 11/17/19  Date of Admission:11/17/2019  6:22 PM     Reason for Consult/Admission:  Diverticulitis    Patient's Chief Complaint: abdominal pain    HPI    Ms. Watson is a nice 49 y.o. female who presents to the ER after a bad week in which her family member has been in the ICU with cardiac issues and she has been eating a poor diet with lots of coffee and stress.  She is getting over a respiratory issue for which she has received multiple rounds of augmentin and zithromax, while continuing to smoke.    She presents with severe pain 10/10 when she coughs, 3-4/10 when still in the left lower quadrant.  The severity is about the same as a prior episode in the summer where Dr Dolan admitted her and kept her for 3 days.  The extent of the pain is greater however.  She denies rectal bleeding.  She has had shaking chills and fever.    Her CT tonight shows acute diverticulitis, involving the distal descending. I reviewed and it is fairly notable with lots of stranding.        Past Medical History:   Diagnosis Date   • Diverticulosis    • Eczema    • Fibrocystic breast    • IBS (irritable bowel syndrome)    • Sleep apnea      Past Surgical History:   Procedure Laterality Date   • ANAL FISTULA REPAIR     • HYSTERECTOMY     • OOPHORECTOMY       Family History   Problem Relation Age of Onset   • Breast cancer Maternal Grandmother      Social History     Socioeconomic History   • Marital status:      Spouse name: Not on file   • Number of children: Not on file   • Years of education: Not on file   • Highest education level: Not on file   Tobacco Use   • Smoking status: Current Every Day Smoker   • Smokeless tobacco: Never Used   Substance and Sexual Activity   • Alcohol use: No     Frequency: Never   • Drug use: No         Current Facility-Administered Medications:   •  acetaminophen (TYLENOL) tablet 650 mg, 650 mg, Oral, Q4H PRN **OR** acetaminophen (TYLENOL) 160 MG/5ML solution 650 mg, 650 mg,  "Oral, Q4H PRN **OR** acetaminophen (TYLENOL) suppository 650 mg, 650 mg, Rectal, Q4H PRN, Ami Dalton MD  •  HYDROcodone-acetaminophen (NORCO) 5-325 MG per tablet 1 tablet, 1 tablet, Oral, Q4H PRN, Ami Dalton MD  •  HYDROmorphone (DILAUDID) injection 0.5 mg, 0.5 mg, Intravenous, Q2H PRN **AND** naloxone (NARCAN) injection 0.4 mg, 0.4 mg, Intravenous, Q5 Min PRN, Ami Dalton MD  •  morphine injection 1 mg, 1 mg, Intravenous, Q4H PRN **AND** naloxone (NARCAN) injection 0.4 mg, 0.4 mg, Intravenous, Q5 Min PRN, Ami Dalton MD  •  ondansetron (ZOFRAN) tablet 4 mg, 4 mg, Oral, Q6H PRN **OR** ondansetron (ZOFRAN) injection 4 mg, 4 mg, Intravenous, Q6H PRN, Ami Dalton MD  •  oxyCODONE-acetaminophen (PERCOCET)  MG per tablet 1 tablet, 1 tablet, Oral, Q4H PRN, Ami Dalton MD  •  [START ON 11/18/2019] piperacillin-tazobactam (ZOSYN) 3.375 g in iso-osmotic dextrose 50 ml (premix), 3.375 g, Intravenous, Q8H, Ami Dalton MD  •  promethazine (PHENERGAN) injection 12.5 mg, 12.5 mg, Intravenous, Q4H PRN, Ami Dalton MD  •  [COMPLETED] Insert peripheral IV, , , Once **AND** sodium chloride 0.9 % flush 10 mL, 10 mL, Intravenous, PRN, Junior Parmar MD, 10 mL at 11/17/19 2110  •  sodium chloride 0.9 % flush 10 mL, 10 mL, Intravenous, Q12H, Ami Dalton MD  •  sodium chloride 0.9 % flush 10 mL, 10 mL, Intravenous, PRN, Ami Dalton MD    Allergies   Allergen Reactions   • Shrimp (Diagnostic)    • Soybean-Containing Drug Products Diarrhea   • Sulfa Antibiotics Swelling       Review of Systems  Fever, pain, cough.  All other systems reviewed and negative.    Vitals:    11/17/19 1834 11/17/19 1936 11/17/19 2005 11/17/19 2135   BP:  133/83 137/86 119/80   Pulse:       Resp:       Temp:       TempSrc:       SpO2:  98% 97% 96%   Weight: (!) 144 kg (318 lb 3.2 oz)      Height:           PHYSICAL EXAM:    /80   Pulse 88   Temp 98.7 °F (37.1 °C) (Tympanic)   Resp 18   Ht 157.5 cm (62\")  "  Wt (!) 144 kg (318 lb 3.2 oz)   SpO2 96%   BMI 58.20 kg/m²   Body mass index is 58.2 kg/m².    Constitutional: well developed, well nourished, located in the ER on a stretcher, obvious discomfort  Eyes: sclerae nonicteric, conjunctivae not injected   ENMT: Hearing intact, trachea midline  CVS: RRR, no murmur, peripheral edema not present  Respiratory: CTA, normal respiratory effort   Gastrointestinal: no hepatosplenomegaly, abdomen rounded, obese, tender on the left, with guarding and rebound, abdominal hernia not detected despite being seen on CT  Genitourinary: inguinal hernia not detected  Musculoskeletal: gait not witnessed, muscle mass normal  Skin: warm and dry, no rashes visible  Neurological: awake and alert, seems to have reasonable capacity for understanding for medical decision making  Psychiatric: appears to have reasonable judgement, pleasant  Lymphatics: no cervical adenopathy    Radiographic Findings: as above    Lab Findings: WBC 14    IMPRESSION:  · Diverticulitis, at least second episode, fairly notable in severity.  · Body mass index is 58.2 kg/m².  · Smoker  · Desire to avoid surgery    PLAN:  · Admit and start IV abx.  Certainly, i agree with her that she is at high risk for surgery.  · Discussed risks of surgery with patient and in particular increased risk of wound infection, poor wound healing, hernias (with abdominal surgery) and post-operative pulmonary complications associated with smoking.   · Discussed with patient increased perioperative risks associated with obesity including increased risks of DVT, infection, seromas, poor wound healing and hernias (with abdominal surgery).  · Dr Dolan will be in tomorrow.    Ami Dalton MD  11/17/19  10:40 PM

## 2019-11-18 NOTE — PROGRESS NOTES
Chief Complaint:    Diverticulitis    Subjective:    Pain is slightly better, but still present.  No bowel movement since prior to admission.    Objective:    Vitals:    11/18/19 0411 11/18/19 0500 11/18/19 0832 11/18/19 1018   BP:  113/73  107/73   BP Location:  Right arm  Left arm   Patient Position:  Lying  Lying   Pulse: 74 68 81 65   Resp: 20 20 20 18   Temp:  97.6 °F (36.4 °C)     TempSrc:  Oral     SpO2:  93% 92% 95%   Weight:       Height:           Lungs: Clear  Heart: Regular  Abdomen: Soft.  Not distended.  Extremities: Warm    Labs reviewed.  WBC 13.00, Hgb 12.5, platelets 299.  Creat 0.73.    AlkP 64, ALT 18, AST 17, T bili 0.5.    I reviewed the images and the report of a CT scan of the abdomen and pelvis performed yesterday.  It shows acute diverticulitis involving the distal descending colon/proximal sigmoid colon.  It is essentially the same region involved earlier this year.  There is no perforation.  There is no abscess.  There is no obstruction.    Assessment:    Recurrent diverticulitis.  Morbid obesity (BMI 57.96)  JR    Plan:    She is improving with IV antibiotics.    I will switch to oral antibiotics when the white blood cell count drops into the normal range.   Keep on clear liquid diet until GI function improves.

## 2019-11-18 NOTE — PLAN OF CARE
Problem: Patient Care Overview  Goal: Plan of Care Review  Outcome: Ongoing (interventions implemented as appropriate)   11/18/19 0048   Coping/Psychosocial   Plan of Care Reviewed With patient   Plan of Care Review   Progress no change   OTHER   Outcome Summary admitted from ER with c/o LLQ abdominal pain, nausea and diarrhea, CT scan positive for diverticulitis, plan of care oinitiated, started on IV antibiocs, on clear liquids, PRN meds for pain and nausea given as needed, will continue to monitor     Goal: Individualization and Mutuality  Outcome: Ongoing (interventions implemented as appropriate)    Goal: Discharge Needs Assessment  Outcome: Ongoing (interventions implemented as appropriate)      Problem: Bowel Disease, Inflammatory (Adult)  Goal: Signs and Symptoms of Listed Potential Problems Will be Absent, Minimized or Managed (Bowel Disease, Inflammatory)  Outcome: Ongoing (interventions implemented as appropriate)

## 2019-11-18 NOTE — ED PROVIDER NOTES
EMERGENCY DEPARTMENT ENCOUNTER    Room Number:  P683/1  Date of encounter:  11/18/2019  PCP: Vipul Keene MD  Historian: Patient      HPI:  Chief Complaint: Abdominal Pain  A complete HPI/ROS/PMH/PSH/SH/FH are unobtainable due to: none  Context: Cari Watson is a 49 y.o. female who presents to the ED c/o LLQ abd pain that radiates across the lower abd which began today. She reports that she has been having nausea and diarrhea, but denies any vomiting. Pt has a hx of diverticulitis and reports that pain is similar, but in a different location.       MEDICAL HISTORY REVIEW    Admitted 6/19-6/21/19 for diverticulitis. Took Augmenting for 14 days.     PAST MEDICAL HISTORY  Active Ambulatory Problems     Diagnosis Date Noted   • Diverticulitis 06/19/2019   • Menopause 11/08/2019   • Hormone replacement therapy (HRT) 11/08/2019   • Morbid obesity with BMI of 50.0-59.9, adult (CMS/[facility]) 11/08/2019     Resolved Ambulatory Problems     Diagnosis Date Noted   • No Resolved Ambulatory Problems     Past Medical History:   Diagnosis Date   • Diverticulosis    • Eczema    • Fibrocystic breast    • IBS (irritable bowel syndrome)    • Sleep apnea          PAST SURGICAL HISTORY  Past Surgical History:   Procedure Laterality Date   • ANAL FISTULA REPAIR     • HYSTERECTOMY     • OOPHORECTOMY           FAMILY HISTORY  Family History   Problem Relation Age of Onset   • Breast cancer Maternal Grandmother          SOCIAL HISTORY  Social History     Socioeconomic History   • Marital status:      Spouse name: Not on file   • Number of children: Not on file   • Years of education: Not on file   • Highest education level: Not on file   Tobacco Use   • Smoking status: Current Every Day Smoker   • Smokeless tobacco: Never Used   Substance and Sexual Activity   • Alcohol use: No     Frequency: Never   • Drug use: No         ALLERGIES  Shrimp (diagnostic); Soybean-containing drug products; and Sulfa antibiotics        REVIEW  OF SYSTEMS  Review of Systems   Constitutional: Negative for fever.   HENT: Negative for sore throat.    Eyes: Negative.    Respiratory: Negative for cough and shortness of breath.    Cardiovascular: Negative for chest pain.   Gastrointestinal: Positive for abdominal pain, diarrhea and nausea. Negative for vomiting.   Genitourinary: Negative for dysuria.   Musculoskeletal: Negative for neck pain.   Skin: Negative for rash.   Allergic/Immunologic: Negative.    Neurological: Negative for weakness, numbness and headaches.   Hematological: Negative.    Psychiatric/Behavioral: Negative.    All other systems reviewed and are negative.       All systems reviewed and negative except for those discussed in HPI.       PHYSICAL EXAM    I have reviewed the triage vital signs and nursing notes.    ED Triage Vitals   Temp Heart Rate Resp BP SpO2   11/17/19 1821 11/17/19 1821 11/17/19 1821 11/17/19 1833 11/17/19 1821   98.7 °F (37.1 °C) 114 16 140/83 95 %      Temp src Heart Rate Source Patient Position BP Location FiO2 (%)   11/17/19 1821 11/17/19 1821 -- -- --   Tympanic Monitor          GENERAL: not distressed, in pain  HENT: nares patent, dry membranes are moist.  EYES: no scleral icterus, PERRLA  CV: regular rhythm, regular rate  RESPIRATORY: normal effort, CTAB  ABDOMEN: soft with diffuse tenderness, no guarding or rebound. Non-distended with diminished active bowel sounds  MUSCULOSKELETAL: no deformity, no pedal edema  NEURO: alert and orientated x3, moves all extremities, follows commands  SKIN: warm, dry          LAB RESULTS  Recent Results (from the past 24 hour(s))   Comprehensive Metabolic Panel    Collection Time: 11/17/19  6:39 PM   Result Value Ref Range    Glucose 98 65 - 99 mg/dL    BUN 8 6 - 20 mg/dL    Creatinine 0.68 0.57 - 1.00 mg/dL    Sodium 138 136 - 145 mmol/L    Potassium 3.6 3.5 - 5.2 mmol/L    Chloride 101 98 - 107 mmol/L    CO2 26.7 22.0 - 29.0 mmol/L    Calcium 8.9 8.6 - 10.5 mg/dL    Total Protein  7.3 6.0 - 8.5 g/dL    Albumin 4.00 3.50 - 5.20 g/dL    ALT (SGPT) 20 1 - 33 U/L    AST (SGOT) 13 1 - 32 U/L    Alkaline Phosphatase 66 39 - 117 U/L    Total Bilirubin 0.3 0.2 - 1.2 mg/dL    eGFR Non African Amer 92 >60 mL/min/1.73    Globulin 3.3 gm/dL    A/G Ratio 1.2 g/dL    BUN/Creatinine Ratio 11.8 7.0 - 25.0    Anion Gap 10.3 5.0 - 15.0 mmol/L   Lipase    Collection Time: 11/17/19  6:39 PM   Result Value Ref Range    Lipase 17 13 - 60 U/L   CBC Auto Differential    Collection Time: 11/17/19  6:39 PM   Result Value Ref Range    WBC 14.49 (H) 3.40 - 10.80 10*3/mm3    RBC 4.01 3.77 - 5.28 10*6/mm3    Hemoglobin 13.0 12.0 - 15.9 g/dL    Hematocrit 37.9 34.0 - 46.6 %    MCV 94.5 79.0 - 97.0 fL    MCH 32.4 26.6 - 33.0 pg    MCHC 34.3 31.5 - 35.7 g/dL    RDW 12.7 12.3 - 15.4 %    RDW-SD 43.9 37.0 - 54.0 fl    MPV 8.6 6.0 - 12.0 fL    Platelets 302 140 - 450 10*3/mm3    Neutrophil % 67.1 42.7 - 76.0 %    Lymphocyte % 22.0 19.6 - 45.3 %    Monocyte % 7.1 5.0 - 12.0 %    Eosinophil % 2.7 0.3 - 6.2 %    Basophil % 0.5 0.0 - 1.5 %    Immature Grans % 0.6 (H) 0.0 - 0.5 %    Neutrophils, Absolute 9.72 (H) 1.70 - 7.00 10*3/mm3    Lymphocytes, Absolute 3.19 (H) 0.70 - 3.10 10*3/mm3    Monocytes, Absolute 1.03 (H) 0.10 - 0.90 10*3/mm3    Eosinophils, Absolute 0.39 0.00 - 0.40 10*3/mm3    Basophils, Absolute 0.07 0.00 - 0.20 10*3/mm3    Immature Grans, Absolute 0.09 (H) 0.00 - 0.05 10*3/mm3    nRBC 0.0 0.0 - 0.2 /100 WBC   Urinalysis With Microscopic If Indicated (No Culture) - Urine, Clean Catch    Collection Time: 11/17/19  8:16 PM   Result Value Ref Range    Color, UA Yellow Yellow, Straw    Appearance, UA Clear Clear    pH, UA 6.0 5.0 - 8.0    Specific Gravity, UA 1.014 1.005 - 1.030    Glucose, UA Negative Negative    Ketones, UA Negative Negative    Bilirubin, UA Negative Negative    Blood, UA Small (1+) (A) Negative    Protein, UA Negative Negative    Leuk Esterase, UA Negative Negative    Nitrite, UA Negative Negative     Urobilinogen, UA 0.2 E.U./dL 0.2 - 1.0 E.U./dL   Urinalysis, Microscopic Only - Urine, Clean Catch    Collection Time: 11/17/19  8:16 PM   Result Value Ref Range    RBC, UA 3-5 (A) None Seen, 0-2 /HPF    WBC, UA 0-2 None Seen, 0-2 /HPF    Bacteria, UA None Seen None Seen /HPF    Squamous Epithelial Cells, UA 0-2 None Seen, 0-2 /HPF    Hyaline Casts, UA None Seen None Seen /LPF    Methodology Automated Microscopy        Ordered the above labs and independently reviewed the results.        RADIOLOGY  Ct Abdomen Pelvis With Contrast    Result Date: 11/17/2019  CT SCANS ABDOMEN AND PELVIS WITH IV CONTRAST.  HISTORY: llq pain  COMPARISON: 06/19/2019.  TECHNIQUE: Radiation dose reduction techniques were utilized, including automated exposure control and exposure modulation based on body size. Axial images were obtained from the lung bases to the symphysis pubis with IV contrast only.. Oral contrast was not administered per request.  FINDINGS :  There is body habitus related artifact. There are small fat-containing midline ventral and umbilical hernias which are unchanged from previous and without associated inflammation.  Normal solid organs, aorta, appendix. Moderate diverticulosis. There is a short segment left colitis involving the distal descending colon with eccentric wall thickening and pericolonic inflammation. This appears to emanate from a prominent diverticulum on axial image 91 suggesting acute diverticulitis. No obstruction or abscess. Recommend endoscopic follow-up.        IMPRESSION : 1. Short segment eccentric left colitis likely acute diverticulitis. Recommend endoscopic follow-up. 2. Stable fat-containing ventral hernias   This report was finalized on 11/17/2019 11:45 PM by Rajesh Hannah M.D.        I ordered the above noted radiological studies. Reviewed by me and discussed with radiologist.  See dictation for official radiology interpretation.      PROCEDURES    Procedures      MEDICATIONS GIVEN IN  ER    Medications   sodium chloride 0.9 % flush 10 mL (10 mL Intravenous Given 11/17/19 2110)   sodium chloride 0.9 % flush 10 mL (10 mL Intravenous Given 11/17/19 2253)   sodium chloride 0.9 % flush 10 mL (not administered)   piperacillin-tazobactam (ZOSYN) 3.375 g in iso-osmotic dextrose 50 ml (premix) (not administered)   acetaminophen (TYLENOL) tablet 650 mg (650 mg Oral Given 11/17/19 2252)     Or   acetaminophen (TYLENOL) 160 MG/5ML solution 650 mg ( Oral Not Given:  See Alt 11/17/19 2252)     Or   acetaminophen (TYLENOL) suppository 650 mg ( Rectal Not Given:  See Alt 11/17/19 2252)   HYDROcodone-acetaminophen (NORCO) 5-325 MG per tablet 1 tablet (not administered)   morphine injection 1 mg (1 mg Intravenous Given 11/17/19 2252)     And   naloxone (NARCAN) injection 0.4 mg (not administered)   oxyCODONE-acetaminophen (PERCOCET)  MG per tablet 1 tablet (not administered)   HYDROmorphone (DILAUDID) injection 0.5 mg (not administered)     And   naloxone (NARCAN) injection 0.4 mg (not administered)   ondansetron (ZOFRAN) tablet 4 mg (not administered)     Or   ondansetron (ZOFRAN) injection 4 mg (not administered)   promethazine (PHENERGAN) injection 12.5 mg (not administered)   sodium chloride 0.9 % bolus 1,000 mL (0 mL Intravenous Stopped 11/17/19 1958)   HYDROmorphone (DILAUDID) injection 0.5 mg (0.5 mg Intravenous Given 11/17/19 1925)   ondansetron (ZOFRAN) injection 4 mg (4 mg Intravenous Given 11/17/19 1925)   iopamidol (ISOVUE-300) 61 % injection 100 mL (85 mL Intravenous Given by Other 11/17/19 2021)   piperacillin-tazobactam (ZOSYN) 4.5 g in iso-osmotic dextrose 100 mL IVPB (premix) (0 g Intravenous Stopped 11/17/19 2204)   HYDROmorphone (DILAUDID) injection 0.5 mg (0.5 mg Intravenous Given 11/17/19 2106)   ondansetron (ZOFRAN) injection 4 mg (4 mg Intravenous Given 11/17/19 2106)         PROGRESS, DATA ANALYSIS, CONSULTS, AND MEDICAL DECISION MAKING    7:21 PM  Ordered IVF, Dilaudid/Zofran. CT  abd/pel ordered. Labs reviewed.    8:48 PM  Rechecked with pt. Discussed with pt about her imaging results showing diverticulitis. She has been given Zosyn abx. Informed of plan to consult with surgeon about admission.     9:07 PM  Discussed pt with Dr. Dalton (Surgeon). She agrees to admit pt to med/surg.     9:57 PM  Pt has been seen in the ED by Dr. Dalton.    All labs have been independently reviewed by me.  All radiology studies have been reviewed by me and discussed with radiologist dictating the report.   EKG's independently viewed and interpreted by me.  Discussion below represents my analysis of pertinent findings related to patient's condition, differential diagnosis, treatment plan and final disposition.           AS OF 12:19 AM VITALS:    BP - 146/94  HR - 86  TEMP - 98.3 °F (36.8 °C) (Oral)  O2 SATS - 92%        DIAGNOSIS  Final diagnoses:   Diverticulitis         DISPOSITION  ADMISSION    Discussed treatment plan and reason for admission with pt/family and admitting physician.  Pt/family voiced understanding of the plan for admission for further testing/treatment as needed.           Documentation assistance provided by geovanna Moses for Dr Valderrama.  Information recorded by the geovanna was done at my direction and has been verified and validated by me.     Andre Moses  11/17/19 2110       Andre Moses  11/17/19 6059       Christiano Valderrama MD  11/18/19 0019

## 2019-11-18 NOTE — ED NOTES
Attempted to call report to CLAUDETTE Moreira.  Currently changing dressing, will return call momentarily.       Elis Ram RN  11/17/19 9790

## 2019-11-18 NOTE — ED NOTES
Patient medicated per MD order.  Pt tolerated well.  Reassurance provided, will continue monitor.      Elis Ram RN  11/17/19 0403

## 2019-11-18 NOTE — PROGRESS NOTES
Clinical Pharmacy Services: Medication History    Cari Watson is a 49 y.o. female presenting to [facility] for Diverticulitis [K57.92]    She  has a past medical history of Diverticulosis, Eczema, Fibrocystic breast, IBS (irritable bowel syndrome), and Sleep apnea.    Allergies as of 11/17/2019 - Reviewed 11/17/2019   Allergen Reaction Noted   • Shrimp (diagnostic)  07/14/2015   • Soybean-containing drug products Diarrhea 06/19/2019   • Sulfa antibiotics Swelling 12/05/2012       Medication information was obtained from: Patient  Pharmacy and Phone Number: [id number] - [facility] [address] [facility] [address] [place] [address] & [place] [address] - [phone] [facility] [phone] FX         Prior to Admission Medications     Prescriptions Last Dose Informant Patient Reported? Taking?    estradiol (MINIVELLE, VIVELLE-DOT) 0.1 MG/24HR patch 11/16/2019 Self No Yes    Place 1 patch on the skin as directed by provider 2 (Two) Times a Week.    Patient taking differently:  Place 1 patch on the skin as directed by provider 2 (Two) Times a Week. Patient placed a new patch yesterday 11/16/2019    ondansetron (ZOFRAN) 4 MG tablet More than a month Self No No    Take 1 tablet by mouth Daily As Needed for Nausea or Vomiting.    Patient taking differently:  Take 4 mg by mouth Daily As Needed for Nausea or Vomiting (Patient has a filled Rx but has stopped taking because she is no longer experiencing nausea or vomiting).            Medication notes:     This medication list is complete to the best of my knowledge as of 11/17/2019    Please call if questions.    William Hernadez CPhT  11/17/2019 9:32 PM

## 2019-11-19 LAB
ANION GAP SERPL CALCULATED.3IONS-SCNC: 13.3 MMOL/L (ref 5–15)
BUN BLD-MCNC: 6 MG/DL (ref 6–20)
BUN/CREAT SERPL: 10.3 (ref 7–25)
CALCIUM SPEC-SCNC: 8.2 MG/DL (ref 8.6–10.5)
CHLORIDE SERPL-SCNC: 103 MMOL/L (ref 98–107)
CO2 SERPL-SCNC: 26.7 MMOL/L (ref 22–29)
CREAT BLD-MCNC: 0.58 MG/DL (ref 0.57–1)
DEPRECATED RDW RBC AUTO: 43.7 FL (ref 37–54)
ERYTHROCYTE [DISTWIDTH] IN BLOOD BY AUTOMATED COUNT: 12.5 % (ref 12.3–15.4)
GFR SERPL CREATININE-BSD FRML MDRD: 110 ML/MIN/1.73
GLUCOSE BLD-MCNC: 107 MG/DL (ref 65–99)
HCT VFR BLD AUTO: 37.4 % (ref 34–46.6)
HGB BLD-MCNC: 12.4 G/DL (ref 12–15.9)
MCH RBC QN AUTO: 31.3 PG (ref 26.6–33)
MCHC RBC AUTO-ENTMCNC: 33.2 G/DL (ref 31.5–35.7)
MCV RBC AUTO: 94.4 FL (ref 79–97)
PLATELET # BLD AUTO: 289 10*3/MM3 (ref 140–450)
PMV BLD AUTO: 9.4 FL (ref 6–12)
POTASSIUM BLD-SCNC: 3.8 MMOL/L (ref 3.5–5.2)
RBC # BLD AUTO: 3.96 10*6/MM3 (ref 3.77–5.28)
SODIUM BLD-SCNC: 143 MMOL/L (ref 136–145)
WBC NRBC COR # BLD: 11.16 10*3/MM3 (ref 3.4–10.8)

## 2019-11-19 PROCEDURE — 25010000002 PIPERACILLIN SOD-TAZOBACTAM PER 1 G: Performed by: SURGERY

## 2019-11-19 PROCEDURE — 25010000002 ONDANSETRON PER 1 MG: Performed by: SURGERY

## 2019-11-19 PROCEDURE — 94799 UNLISTED PULMONARY SVC/PX: CPT

## 2019-11-19 PROCEDURE — 99231 SBSQ HOSP IP/OBS SF/LOW 25: CPT | Performed by: SURGERY

## 2019-11-19 PROCEDURE — 25010000002 HYDROMORPHONE PER 4 MG: Performed by: SURGERY

## 2019-11-19 PROCEDURE — 85027 COMPLETE CBC AUTOMATED: CPT | Performed by: SURGERY

## 2019-11-19 PROCEDURE — 80048 BASIC METABOLIC PNL TOTAL CA: CPT | Performed by: SURGERY

## 2019-11-19 RX ORDER — ALBUTEROL SULFATE 2.5 MG/3ML
2.5 SOLUTION RESPIRATORY (INHALATION)
Status: DISCONTINUED | OUTPATIENT
Start: 2019-11-19 | End: 2019-11-20 | Stop reason: HOSPADM

## 2019-11-19 RX ADMIN — ALBUTEROL SULFATE 2.5 MG: 2.5 SOLUTION RESPIRATORY (INHALATION) at 19:57

## 2019-11-19 RX ADMIN — TAZOBACTAM SODIUM AND PIPERACILLIN SODIUM 4.5 G: 500; 4 INJECTION, SOLUTION INTRAVENOUS at 12:17

## 2019-11-19 RX ADMIN — HYDROMORPHONE HYDROCHLORIDE 0.5 MG: 1 INJECTION, SOLUTION INTRAMUSCULAR; INTRAVENOUS; SUBCUTANEOUS at 14:05

## 2019-11-19 RX ADMIN — HYDROCODONE BITARTRATE AND ACETAMINOPHEN 1 TABLET: 5; 325 TABLET ORAL at 18:09

## 2019-11-19 RX ADMIN — ALBUTEROL SULFATE 2.5 MG: 2.5 SOLUTION RESPIRATORY (INHALATION) at 03:06

## 2019-11-19 RX ADMIN — ALBUTEROL SULFATE 2.5 MG: 2.5 SOLUTION RESPIRATORY (INHALATION) at 14:25

## 2019-11-19 RX ADMIN — HYDROMORPHONE HYDROCHLORIDE 0.5 MG: 1 INJECTION, SOLUTION INTRAMUSCULAR; INTRAVENOUS; SUBCUTANEOUS at 20:19

## 2019-11-19 RX ADMIN — ALBUTEROL SULFATE 2.5 MG: 2.5 SOLUTION RESPIRATORY (INHALATION) at 09:25

## 2019-11-19 RX ADMIN — TAZOBACTAM SODIUM AND PIPERACILLIN SODIUM 4.5 G: 500; 4 INJECTION, SOLUTION INTRAVENOUS at 20:19

## 2019-11-19 RX ADMIN — HYDROMORPHONE HYDROCHLORIDE 0.5 MG: 1 INJECTION, SOLUTION INTRAMUSCULAR; INTRAVENOUS; SUBCUTANEOUS at 10:33

## 2019-11-19 RX ADMIN — TAZOBACTAM SODIUM AND PIPERACILLIN SODIUM 3.38 G: 375; 3 INJECTION, SOLUTION INTRAVENOUS at 02:24

## 2019-11-19 RX ADMIN — ONDANSETRON 4 MG: 2 INJECTION INTRAMUSCULAR; INTRAVENOUS at 02:19

## 2019-11-19 NOTE — PLAN OF CARE
Problem: Patient Care Overview  Goal: Plan of Care Review  Outcome: Ongoing (interventions implemented as appropriate)  Continued care as ordered per physician

## 2019-11-19 NOTE — PLAN OF CARE
Problem: Patient Care Overview  Goal: Plan of Care Review  Outcome: Ongoing (interventions implemented as appropriate)   11/19/19 7182   Coping/Psychosocial   Plan of Care Reviewed With patient   Plan of Care Review   Progress no change   OTHER   Outcome Summary VSS. Pain in abd well controlled with dilaudid. Patricio reg diet in small amts. Attempted to shower this afternoon and was unable to finish. She had horrible stomach cramps and had to be assiisted back to bed an medicated x2. Breathing tx hanged to q4 hours scheduled. Lungs still with wheezes, but improved.      Goal: Individualization and Mutuality  Outcome: Ongoing (interventions implemented as appropriate)    Goal: Discharge Needs Assessment  Outcome: Ongoing (interventions implemented as appropriate)    Goal: Interprofessional Rounds/Family Conf  Outcome: Ongoing (interventions implemented as appropriate)      Problem: Bowel Disease, Inflammatory (Adult)  Goal: Signs and Symptoms of Listed Potential Problems Will be Absent, Minimized or Managed (Bowel Disease, Inflammatory)  Outcome: Ongoing (interventions implemented as appropriate)      Problem: Pain, Acute (Adult)  Goal: Identify Related Risk Factors and Signs and Symptoms  Outcome: Ongoing (interventions implemented as appropriate)    Goal: Acceptable Pain Control/Comfort Level  Outcome: Ongoing (interventions implemented as appropriate)

## 2019-11-19 NOTE — PROGRESS NOTES
Discharge Planning Assessment  [facility]     Patient Name: Cari Watson  MRN: [id number]  Today's Date: 11/19/2019    Admit Date: 11/17/2019    Discharge Needs Assessment     Row Name 11/19/19 1557       Living Environment    Lives With  spouse    Name(s) of Who Lives With Patient  -- : Matt    Current Living Arrangements  home/apartment/condo    Primary Care Provided by  self       Resource/Environmental Concerns    Resource/Environmental Concerns  none    Transportation Concerns  car, none       Transition Planning    Patient/Family Anticipates Transition to  home with family    Transportation Anticipated  family or friend will provide       Discharge Needs Assessment    Readmission Within the Last 30 Days  no previous admission in last 30 days    Concerns to be Addressed  no discharge needs identified;denies needs/concerns at this time;adjustment to diagnosis/illness    Equipment Currently Used at Home  bipap/cpap    Anticipated Changes Related to Illness  none    Equipment Needed After Discharge  none    Row Name 11/19/19 1556       Living Environment    Lives With  alone    Current Living Arrangements  home/apartment/condo    Primary Care Provided by  self    Provides Primary Care For  no one    Family Caregiver if Needed  spouse    Family Caregiver Names  -- : Matt Watson    Quality of Family Relationships  supportive    Able to Return to Prior Arrangements  yes        Discharge Plan     Row Name 11/19/19 1555       Plan    Plan  Home w/o needs    Provided post acute provider list?  Yes    Post Acute Provider Lists  Home Health;Nursing Home    Plan Comments  Pt confirmed the addres, PCP and Pharmacy are correct. Pt said she lives with her  Matt and 3 big dogs. Pt said she is IADL, can afford her Rx, only DME she uses is a CPAP but has access to a nebulizer that is not hers.  pt said she has never had home health and plans to return home at discharge and does not anticipate any  needs.     Esperanza Fernandes RN     Demographic Summary     Row Name 11/19/19 1556       General Information    Admission Type  inpatient    Arrived From  home    Referral Source  admission list    Reason for Consult  discharge planning    Preferred Language  English       Contact Information    Permission Granted to Share Info With  family/designee        Functional Status     Row Name 11/19/19 1556       Functional Status, IADL    Medications  independent    Meal Preparation  independent    Housekeeping  independent    Laundry  independent    Shopping  independent     Patient Forms     Row Name 11/19/19 1557       Patient Forms    Provider Choice List  Delivered    Delivered to  Patient    Method of delivery  In person            Esperanza Fernandes, RN

## 2019-11-19 NOTE — PLAN OF CARE
Problem: Patient Care Overview  Goal: Plan of Care Review  Outcome: Ongoing (interventions implemented as appropriate)   11/19/19 0518   Coping/Psychosocial   Plan of Care Reviewed With patient   Plan of Care Review   Progress no change   OTHER   Outcome Summary still c/o intermittent LLQ pain aggravated with couging spells, medicated, audible wheezes noted , frequent congested cough, requiring breathing treatment, no BM during the night, for further care     Goal: Individualization and Mutuality  Outcome: Ongoing (interventions implemented as appropriate)    Goal: Discharge Needs Assessment  Outcome: Ongoing (interventions implemented as appropriate)      Problem: Bowel Disease, Inflammatory (Adult)  Goal: Signs and Symptoms of Listed Potential Problems Will be Absent, Minimized or Managed (Bowel Disease, Inflammatory)  Outcome: Ongoing (interventions implemented as appropriate)      Problem: Pain, Acute (Adult)  Goal: Identify Related Risk Factors and Signs and Symptoms  Outcome: Ongoing (interventions implemented as appropriate)    Goal: Acceptable Pain Control/Comfort Level  Outcome: Ongoing (interventions implemented as appropriate)

## 2019-11-19 NOTE — PROGRESS NOTES
Chief Complaint:    Diverticulitis    Subjective:    Pain is gone. Wants to go home.    Objective:    Vitals:    11/18/19 2226 11/19/19 0306 11/19/19 0522 11/19/19 0627   BP: 122/70  119/79    BP Location: Left arm  Left arm    Patient Position: Lying  Lying    Pulse: 79 74 69    Resp: 20 20 20    Temp: 99 °F (37.2 °C)  97.2 °F (36.2 °C)    TempSrc: Oral  Oral    SpO2: 95%  94%    Weight:    (!) 143 kg (315 lb 13.1 oz)   Height:           Lungs: Clear  Heart: Regular  Abdomen: Soft.  Not distended.  Extremities: Warm    Labs reviewed.  WBC 11.16, Hgb 12.4, platelets 289.  Creat 0.58.    Assessment:    Recurrent diverticulitis.  Morbid obesity (BMI 57.96)  JR    Plan:    She is improving with IV antibiotics.    I will switch to oral antibiotics when the white blood cell count drops into the normal range.   Begin regular diet today.

## 2019-11-20 VITALS
OXYGEN SATURATION: 91 % | HEIGHT: 62 IN | RESPIRATION RATE: 20 BRPM | SYSTOLIC BLOOD PRESSURE: 124 MMHG | WEIGHT: 293 LBS | DIASTOLIC BLOOD PRESSURE: 78 MMHG | HEART RATE: 76 BPM | TEMPERATURE: 98 F | BODY MASS INDEX: 53.92 KG/M2

## 2019-11-20 LAB
DEPRECATED RDW RBC AUTO: 43.4 FL (ref 37–54)
ERYTHROCYTE [DISTWIDTH] IN BLOOD BY AUTOMATED COUNT: 12.6 % (ref 12.3–15.4)
HCT VFR BLD AUTO: 35.8 % (ref 34–46.6)
HGB BLD-MCNC: 11.7 G/DL (ref 12–15.9)
MCH RBC QN AUTO: 30.9 PG (ref 26.6–33)
MCHC RBC AUTO-ENTMCNC: 32.7 G/DL (ref 31.5–35.7)
MCV RBC AUTO: 94.5 FL (ref 79–97)
PLATELET # BLD AUTO: 281 10*3/MM3 (ref 140–450)
PMV BLD AUTO: 9.5 FL (ref 6–12)
RBC # BLD AUTO: 3.79 10*6/MM3 (ref 3.77–5.28)
WBC NRBC COR # BLD: 9.58 10*3/MM3 (ref 3.4–10.8)

## 2019-11-20 PROCEDURE — 99238 HOSP IP/OBS DSCHRG MGMT 30/<: CPT | Performed by: SURGERY

## 2019-11-20 PROCEDURE — 25010000002 PIPERACILLIN SOD-TAZOBACTAM PER 1 G: Performed by: SURGERY

## 2019-11-20 PROCEDURE — 94799 UNLISTED PULMONARY SVC/PX: CPT

## 2019-11-20 PROCEDURE — 85027 COMPLETE CBC AUTOMATED: CPT | Performed by: SURGERY

## 2019-11-20 RX ORDER — HYDROCODONE BITARTRATE AND ACETAMINOPHEN 5; 325 MG/1; MG/1
1 TABLET ORAL EVERY 6 HOURS PRN
Qty: 15 TABLET | Refills: 0 | Status: SHIPPED | OUTPATIENT
Start: 2019-11-20 | End: 2019-11-27

## 2019-11-20 RX ORDER — AMOXICILLIN AND CLAVULANATE POTASSIUM 875; 125 MG/1; MG/1
1 TABLET, FILM COATED ORAL EVERY 12 HOURS SCHEDULED
Status: DISCONTINUED | OUTPATIENT
Start: 2019-11-20 | End: 2019-11-20 | Stop reason: HOSPADM

## 2019-11-20 RX ORDER — AMOXICILLIN AND CLAVULANATE POTASSIUM 875; 125 MG/1; MG/1
1 TABLET, FILM COATED ORAL EVERY 12 HOURS SCHEDULED
Qty: 22 TABLET | Refills: 0 | Status: SHIPPED | OUTPATIENT
Start: 2019-11-20 | End: 2019-12-01

## 2019-11-20 RX ADMIN — TAZOBACTAM SODIUM AND PIPERACILLIN SODIUM 4.5 G: 500; 4 INJECTION, SOLUTION INTRAVENOUS at 03:22

## 2019-11-20 RX ADMIN — SODIUM CHLORIDE, PRESERVATIVE FREE 10 ML: 5 INJECTION INTRAVENOUS at 07:41

## 2019-11-20 RX ADMIN — HYDROCODONE BITARTRATE AND ACETAMINOPHEN 1 TABLET: 5; 325 TABLET ORAL at 07:36

## 2019-11-20 RX ADMIN — ALBUTEROL SULFATE 2.5 MG: 2.5 SOLUTION RESPIRATORY (INHALATION) at 06:54

## 2019-11-20 NOTE — PLAN OF CARE
Problem: Patient Care Overview  Goal: Plan of Care Review  Outcome: Ongoing (interventions implemented as appropriate)   11/20/19 0423   Coping/Psychosocial   Plan of Care Reviewed With patient   Plan of Care Review   Progress no change     Goal: Individualization and Mutuality  Outcome: Ongoing (interventions implemented as appropriate)    Goal: Discharge Needs Assessment  Outcome: Ongoing (interventions implemented as appropriate)    Goal: Interprofessional Rounds/Family Conf  Outcome: Ongoing (interventions implemented as appropriate)      Problem: Bowel Disease, Inflammatory (Adult)  Goal: Signs and Symptoms of Listed Potential Problems Will be Absent, Minimized or Managed (Bowel Disease, Inflammatory)  Outcome: Ongoing (interventions implemented as appropriate)      Problem: Pain, Acute (Adult)  Goal: Identify Related Risk Factors and Signs and Symptoms  Outcome: Ongoing (interventions implemented as appropriate)    Goal: Acceptable Pain Control/Comfort Level  Outcome: Ongoing (interventions implemented as appropriate)

## 2019-11-20 NOTE — PROGRESS NOTES
Discharge Planning Assessment  [facility]     Patient Name: Cari Watson  MRN: [id number]  Today's Date: 11/20/2019    Admit Date: 11/17/2019      Discharge Plan     Row Name 11/20/19 1007       Plan    Plan Comments  Discharge order. No discharge needs identified.    Final Discharge Disposition Code  01 - home or self-care     Expected Discharge Date and Time     Expected Discharge Date Expected Discharge Time    Nov 20, 2019      Esperanza Fernandes RN

## 2020-01-31 ENCOUNTER — TELEPHONE (OUTPATIENT)
Dept: SURGERY | Facility: CLINIC | Age: 50
End: 2020-01-31

## 2020-02-01 ENCOUNTER — HOSPITAL ENCOUNTER (INPATIENT)
Facility: HOSPITAL | Age: 50
LOS: 3 days | Discharge: HOME | End: 2020-02-04
Attending: EMERGENCY MEDICINE | Admitting: HOSPITALIST

## 2020-02-01 ENCOUNTER — APPOINTMENT (OUTPATIENT)
Dept: CT IMAGING | Facility: HOSPITAL | Age: 50
End: 2020-02-01

## 2020-02-01 DIAGNOSIS — K57.92 ACUTE DIVERTICULITIS: Primary | ICD-10-CM

## 2020-02-01 DIAGNOSIS — R50.9 FEVER AND CHILLS: ICD-10-CM

## 2020-02-01 LAB
ALBUMIN SERPL-MCNC: 3.8 G/DL (ref 3.5–5.2)
ALBUMIN/GLOB SERPL: 1.1 G/DL
ALP SERPL-CCNC: 57 U/L (ref 39–117)
ALT SERPL W P-5'-P-CCNC: 15 U/L (ref 1–33)
ANION GAP SERPL CALCULATED.3IONS-SCNC: 13.8 MMOL/L (ref 5–15)
AST SERPL-CCNC: 9 U/L (ref 1–32)
BACTERIA UR QL AUTO: ABNORMAL /HPF
BASOPHILS # BLD AUTO: 0.05 10*3/MM3 (ref 0–0.2)
BASOPHILS NFR BLD AUTO: 0.4 % (ref 0–1.5)
BILIRUB SERPL-MCNC: 0.5 MG/DL (ref 0.2–1.2)
BILIRUB UR QL STRIP: NEGATIVE
BUN BLD-MCNC: 6 MG/DL (ref 6–20)
BUN/CREAT SERPL: 8.1 (ref 7–25)
CALCIUM SPEC-SCNC: 8.7 MG/DL (ref 8.6–10.5)
CHLORIDE SERPL-SCNC: 102 MMOL/L (ref 98–107)
CLARITY UR: CLEAR
CO2 SERPL-SCNC: 25.2 MMOL/L (ref 22–29)
COLOR UR: YELLOW
CREAT BLD-MCNC: 0.74 MG/DL (ref 0.57–1)
D-LACTATE SERPL-SCNC: 0.8 MMOL/L (ref 0.5–2)
DEPRECATED RDW RBC AUTO: 42 FL (ref 37–54)
EOSINOPHIL # BLD AUTO: 0.17 10*3/MM3 (ref 0–0.4)
EOSINOPHIL NFR BLD AUTO: 1.3 % (ref 0.3–6.2)
ERYTHROCYTE [DISTWIDTH] IN BLOOD BY AUTOMATED COUNT: 12.6 % (ref 12.3–15.4)
GFR SERPL CREATININE-BSD FRML MDRD: 83 ML/MIN/1.73
GLOBULIN UR ELPH-MCNC: 3.6 GM/DL
GLUCOSE BLD-MCNC: 89 MG/DL (ref 65–99)
GLUCOSE UR STRIP-MCNC: NEGATIVE MG/DL
HCT VFR BLD AUTO: 37.8 % (ref 34–46.6)
HGB BLD-MCNC: 13.2 G/DL (ref 12–15.9)
HGB UR QL STRIP.AUTO: ABNORMAL
HYALINE CASTS UR QL AUTO: ABNORMAL /LPF
IMM GRANULOCYTES # BLD AUTO: 0.08 10*3/MM3 (ref 0–0.05)
IMM GRANULOCYTES NFR BLD AUTO: 0.6 % (ref 0–0.5)
KETONES UR QL STRIP: NEGATIVE
LEUKOCYTE ESTERASE UR QL STRIP.AUTO: NEGATIVE
LIPASE SERPL-CCNC: 12 U/L (ref 13–60)
LYMPHOCYTES # BLD AUTO: 2.99 10*3/MM3 (ref 0.7–3.1)
LYMPHOCYTES NFR BLD AUTO: 22.7 % (ref 19.6–45.3)
MCH RBC QN AUTO: 32.4 PG (ref 26.6–33)
MCHC RBC AUTO-ENTMCNC: 34.9 G/DL (ref 31.5–35.7)
MCV RBC AUTO: 92.9 FL (ref 79–97)
MONOCYTES # BLD AUTO: 1.05 10*3/MM3 (ref 0.1–0.9)
MONOCYTES NFR BLD AUTO: 8 % (ref 5–12)
NEUTROPHILS # BLD AUTO: 8.86 10*3/MM3 (ref 1.7–7)
NEUTROPHILS NFR BLD AUTO: 67 % (ref 42.7–76)
NITRITE UR QL STRIP: NEGATIVE
NRBC BLD AUTO-RTO: 0 /100 WBC (ref 0–0.2)
PH UR STRIP.AUTO: 6.5 [PH] (ref 5–8)
PLATELET # BLD AUTO: 302 10*3/MM3 (ref 140–450)
PMV BLD AUTO: 9.1 FL (ref 6–12)
POTASSIUM BLD-SCNC: 3.7 MMOL/L (ref 3.5–5.2)
PROT SERPL-MCNC: 7.4 G/DL (ref 6–8.5)
PROT UR QL STRIP: NEGATIVE
RBC # BLD AUTO: 4.07 10*6/MM3 (ref 3.77–5.28)
RBC # UR: ABNORMAL /HPF
REF LAB TEST METHOD: ABNORMAL
SODIUM BLD-SCNC: 141 MMOL/L (ref 136–145)
SP GR UR STRIP: 1.01 (ref 1–1.03)
SQUAMOUS #/AREA URNS HPF: ABNORMAL /HPF
UROBILINOGEN UR QL STRIP: ABNORMAL
WBC NRBC COR # BLD: 13.2 10*3/MM3 (ref 3.4–10.8)
WBC UR QL AUTO: ABNORMAL /HPF
YEAST URNS QL MICRO: ABNORMAL /HPF

## 2020-02-01 PROCEDURE — 25010000002 ONDANSETRON PER 1 MG: Performed by: EMERGENCY MEDICINE

## 2020-02-01 PROCEDURE — 25010000002 HYDROMORPHONE PER 4 MG: Performed by: EMERGENCY MEDICINE

## 2020-02-01 PROCEDURE — 80053 COMPREHEN METABOLIC PANEL: CPT | Performed by: EMERGENCY MEDICINE

## 2020-02-01 PROCEDURE — 81001 URINALYSIS AUTO W/SCOPE: CPT | Performed by: EMERGENCY MEDICINE

## 2020-02-01 PROCEDURE — 25010000002 PIPERACILLIN SOD-TAZOBACTAM PER 1 G: Performed by: EMERGENCY MEDICINE

## 2020-02-01 PROCEDURE — 83690 ASSAY OF LIPASE: CPT | Performed by: EMERGENCY MEDICINE

## 2020-02-01 PROCEDURE — 25010000002 IOPAMIDOL 61 % SOLUTION: Performed by: EMERGENCY MEDICINE

## 2020-02-01 PROCEDURE — 74177 CT ABD & PELVIS W/CONTRAST: CPT

## 2020-02-01 PROCEDURE — 83605 ASSAY OF LACTIC ACID: CPT | Performed by: EMERGENCY MEDICINE

## 2020-02-01 PROCEDURE — 85025 COMPLETE CBC W/AUTO DIFF WBC: CPT | Performed by: EMERGENCY MEDICINE

## 2020-02-01 PROCEDURE — 99284 EMERGENCY DEPT VISIT MOD MDM: CPT

## 2020-02-01 RX ORDER — HYDROMORPHONE HYDROCHLORIDE 1 MG/ML
0.5 INJECTION, SOLUTION INTRAMUSCULAR; INTRAVENOUS; SUBCUTANEOUS
Status: DISCONTINUED | OUTPATIENT
Start: 2020-02-01 | End: 2020-02-02

## 2020-02-01 RX ORDER — SODIUM CHLORIDE 0.9 % (FLUSH) 0.9 %
10 SYRINGE (ML) INJECTION AS NEEDED
Status: DISCONTINUED | OUTPATIENT
Start: 2020-02-01 | End: 2020-02-04 | Stop reason: HOSPADM

## 2020-02-01 RX ORDER — ONDANSETRON 2 MG/ML
4 INJECTION INTRAMUSCULAR; INTRAVENOUS ONCE
Status: COMPLETED | OUTPATIENT
Start: 2020-02-01 | End: 2020-02-01

## 2020-02-01 RX ORDER — ONDANSETRON 2 MG/ML
4 INJECTION INTRAMUSCULAR; INTRAVENOUS EVERY 6 HOURS PRN
Status: DISCONTINUED | OUTPATIENT
Start: 2020-02-01 | End: 2020-02-02

## 2020-02-01 RX ORDER — HYDROMORPHONE HYDROCHLORIDE 1 MG/ML
0.5 INJECTION, SOLUTION INTRAMUSCULAR; INTRAVENOUS; SUBCUTANEOUS ONCE
Status: COMPLETED | OUTPATIENT
Start: 2020-02-01 | End: 2020-02-01

## 2020-02-01 RX ADMIN — SODIUM CHLORIDE 1000 ML: 9 INJECTION, SOLUTION INTRAVENOUS at 21:46

## 2020-02-01 RX ADMIN — TAZOBACTAM SODIUM AND PIPERACILLIN SODIUM 4.5 G: 500; 4 INJECTION, SOLUTION INTRAVENOUS at 23:07

## 2020-02-01 RX ADMIN — IOPAMIDOL 85 ML: 612 INJECTION, SOLUTION INTRAVENOUS at 22:13

## 2020-02-01 RX ADMIN — HYDROMORPHONE HYDROCHLORIDE 0.5 MG: 1 INJECTION, SOLUTION INTRAMUSCULAR; INTRAVENOUS; SUBCUTANEOUS at 23:43

## 2020-02-01 RX ADMIN — ONDANSETRON 4 MG: 2 INJECTION INTRAMUSCULAR; INTRAVENOUS at 23:43

## 2020-02-01 RX ADMIN — HYDROMORPHONE HYDROCHLORIDE 0.5 MG: 1 INJECTION, SOLUTION INTRAMUSCULAR; INTRAVENOUS; SUBCUTANEOUS at 21:46

## 2020-02-01 RX ADMIN — ONDANSETRON 4 MG: 2 INJECTION INTRAMUSCULAR; INTRAVENOUS at 21:46

## 2020-02-02 PROBLEM — L30.9 ECZEMA: Status: ACTIVE | Noted: 2020-02-02

## 2020-02-02 PROBLEM — K58.9 IBS (IRRITABLE BOWEL SYNDROME): Status: ACTIVE | Noted: 2020-02-02

## 2020-02-02 PROBLEM — K57.90 DIVERTICULOSIS: Status: ACTIVE | Noted: 2020-02-02

## 2020-02-02 PROBLEM — A41.9 SEPSIS WITHOUT ACUTE ORGAN DYSFUNCTION (HCC): Status: ACTIVE | Noted: 2020-02-02

## 2020-02-02 PROBLEM — G47.30 SLEEP APNEA: Status: ACTIVE | Noted: 2020-02-02

## 2020-02-02 LAB
ANION GAP SERPL CALCULATED.3IONS-SCNC: 13.9 MMOL/L (ref 5–15)
BASOPHILS # BLD AUTO: 0.05 10*3/MM3 (ref 0–0.2)
BASOPHILS NFR BLD AUTO: 0.4 % (ref 0–1.5)
BUN BLD-MCNC: 5 MG/DL (ref 6–20)
BUN/CREAT SERPL: 7.6 (ref 7–25)
CALCIUM SPEC-SCNC: 7.9 MG/DL (ref 8.6–10.5)
CHLORIDE SERPL-SCNC: 101 MMOL/L (ref 98–107)
CO2 SERPL-SCNC: 23.1 MMOL/L (ref 22–29)
CREAT BLD-MCNC: 0.66 MG/DL (ref 0.57–1)
DEPRECATED RDW RBC AUTO: 42.9 FL (ref 37–54)
EOSINOPHIL # BLD AUTO: 0.18 10*3/MM3 (ref 0–0.4)
EOSINOPHIL NFR BLD AUTO: 1.4 % (ref 0.3–6.2)
ERYTHROCYTE [DISTWIDTH] IN BLOOD BY AUTOMATED COUNT: 12.5 % (ref 12.3–15.4)
GFR SERPL CREATININE-BSD FRML MDRD: 95 ML/MIN/1.73
GLUCOSE BLD-MCNC: 95 MG/DL (ref 65–99)
HCT VFR BLD AUTO: 34.3 % (ref 34–46.6)
HGB BLD-MCNC: 11.7 G/DL (ref 12–15.9)
IMM GRANULOCYTES # BLD AUTO: 0.09 10*3/MM3 (ref 0–0.05)
IMM GRANULOCYTES NFR BLD AUTO: 0.7 % (ref 0–0.5)
LYMPHOCYTES # BLD AUTO: 3.32 10*3/MM3 (ref 0.7–3.1)
LYMPHOCYTES NFR BLD AUTO: 26.1 % (ref 19.6–45.3)
MCH RBC QN AUTO: 32.1 PG (ref 26.6–33)
MCHC RBC AUTO-ENTMCNC: 34.1 G/DL (ref 31.5–35.7)
MCV RBC AUTO: 94.2 FL (ref 79–97)
MONOCYTES # BLD AUTO: 1.02 10*3/MM3 (ref 0.1–0.9)
MONOCYTES NFR BLD AUTO: 8 % (ref 5–12)
NEUTROPHILS # BLD AUTO: 8.08 10*3/MM3 (ref 1.7–7)
NEUTROPHILS NFR BLD AUTO: 63.4 % (ref 42.7–76)
NRBC BLD AUTO-RTO: 0 /100 WBC (ref 0–0.2)
PLATELET # BLD AUTO: 279 10*3/MM3 (ref 140–450)
PMV BLD AUTO: 9.3 FL (ref 6–12)
POTASSIUM BLD-SCNC: 3.5 MMOL/L (ref 3.5–5.2)
RBC # BLD AUTO: 3.64 10*6/MM3 (ref 3.77–5.28)
SODIUM BLD-SCNC: 138 MMOL/L (ref 136–145)
WBC NRBC COR # BLD: 12.74 10*3/MM3 (ref 3.4–10.8)

## 2020-02-02 PROCEDURE — 25010000002 MORPHINE PER 10 MG: Performed by: HOSPITALIST

## 2020-02-02 PROCEDURE — 25010000002 ENOXAPARIN PER 10 MG: Performed by: HOSPITALIST

## 2020-02-02 PROCEDURE — 94799 UNLISTED PULMONARY SVC/PX: CPT

## 2020-02-02 PROCEDURE — 25010000002 HYDROMORPHONE PER 4 MG: Performed by: HOSPITALIST

## 2020-02-02 PROCEDURE — 36415 COLL VENOUS BLD VENIPUNCTURE: CPT | Performed by: HOSPITALIST

## 2020-02-02 PROCEDURE — 25010000002 PIPERACILLIN SOD-TAZOBACTAM PER 1 G: Performed by: HOSPITALIST

## 2020-02-02 PROCEDURE — 25010000002 ONDANSETRON PER 1 MG: Performed by: HOSPITALIST

## 2020-02-02 PROCEDURE — 99253 IP/OBS CNSLTJ NEW/EST LOW 45: CPT | Performed by: SURGERY

## 2020-02-02 PROCEDURE — 85025 COMPLETE CBC W/AUTO DIFF WBC: CPT | Performed by: HOSPITALIST

## 2020-02-02 PROCEDURE — 80048 BASIC METABOLIC PNL TOTAL CA: CPT | Performed by: HOSPITALIST

## 2020-02-02 RX ORDER — OXYCODONE AND ACETAMINOPHEN 7.5; 325 MG/1; MG/1
1 TABLET ORAL EVERY 4 HOURS PRN
Status: DISCONTINUED | OUTPATIENT
Start: 2020-02-02 | End: 2020-02-04 | Stop reason: HOSPADM

## 2020-02-02 RX ORDER — SODIUM CHLORIDE 9 MG/ML
100 INJECTION, SOLUTION INTRAVENOUS CONTINUOUS
Status: DISCONTINUED | OUTPATIENT
Start: 2020-02-02 | End: 2020-02-04 | Stop reason: HOSPADM

## 2020-02-02 RX ORDER — SODIUM CHLORIDE 0.9 % (FLUSH) 0.9 %
10 SYRINGE (ML) INJECTION AS NEEDED
Status: DISCONTINUED | OUTPATIENT
Start: 2020-02-02 | End: 2020-02-04 | Stop reason: HOSPADM

## 2020-02-02 RX ORDER — ONDANSETRON 4 MG/1
4 TABLET, FILM COATED ORAL EVERY 6 HOURS PRN
Status: DISCONTINUED | OUTPATIENT
Start: 2020-02-02 | End: 2020-02-04 | Stop reason: HOSPADM

## 2020-02-02 RX ORDER — ACETAMINOPHEN 325 MG/1
650 TABLET ORAL EVERY 4 HOURS PRN
Status: DISCONTINUED | OUTPATIENT
Start: 2020-02-02 | End: 2020-02-04 | Stop reason: HOSPADM

## 2020-02-02 RX ORDER — SODIUM CHLORIDE 0.9 % (FLUSH) 0.9 %
10 SYRINGE (ML) INJECTION EVERY 12 HOURS SCHEDULED
Status: DISCONTINUED | OUTPATIENT
Start: 2020-02-02 | End: 2020-02-04 | Stop reason: HOSPADM

## 2020-02-02 RX ORDER — ONDANSETRON 2 MG/ML
4 INJECTION INTRAMUSCULAR; INTRAVENOUS EVERY 6 HOURS PRN
Status: DISCONTINUED | OUTPATIENT
Start: 2020-02-02 | End: 2020-02-04 | Stop reason: HOSPADM

## 2020-02-02 RX ORDER — NALOXONE HCL 0.4 MG/ML
0.4 VIAL (ML) INJECTION
Status: DISCONTINUED | OUTPATIENT
Start: 2020-02-02 | End: 2020-02-04 | Stop reason: HOSPADM

## 2020-02-02 RX ORDER — ACETAMINOPHEN 160 MG/5ML
650 SOLUTION ORAL EVERY 4 HOURS PRN
Status: DISCONTINUED | OUTPATIENT
Start: 2020-02-02 | End: 2020-02-02

## 2020-02-02 RX ORDER — MORPHINE SULFATE 2 MG/ML
4 INJECTION, SOLUTION INTRAMUSCULAR; INTRAVENOUS EVERY 4 HOURS PRN
Status: DISCONTINUED | OUTPATIENT
Start: 2020-02-02 | End: 2020-02-04 | Stop reason: HOSPADM

## 2020-02-02 RX ORDER — ACETAMINOPHEN 650 MG/1
650 SUPPOSITORY RECTAL EVERY 4 HOURS PRN
Status: DISCONTINUED | OUTPATIENT
Start: 2020-02-02 | End: 2020-02-02

## 2020-02-02 RX ORDER — HYDROMORPHONE HYDROCHLORIDE 1 MG/ML
0.5 INJECTION, SOLUTION INTRAMUSCULAR; INTRAVENOUS; SUBCUTANEOUS
Status: DISCONTINUED | OUTPATIENT
Start: 2020-02-02 | End: 2020-02-02

## 2020-02-02 RX ADMIN — TAZOBACTAM SODIUM AND PIPERACILLIN SODIUM 4.5 G: 500; 4 INJECTION, SOLUTION INTRAVENOUS at 16:59

## 2020-02-02 RX ADMIN — POLYETHYLENE GLYCOL 3350 17 G: 17 POWDER, FOR SOLUTION ORAL at 12:57

## 2020-02-02 RX ADMIN — TAZOBACTAM SODIUM AND PIPERACILLIN SODIUM 4.5 G: 500; 4 INJECTION, SOLUTION INTRAVENOUS at 06:36

## 2020-02-02 RX ADMIN — HYDROMORPHONE HYDROCHLORIDE 0.5 MG: 1 INJECTION, SOLUTION INTRAMUSCULAR; INTRAVENOUS; SUBCUTANEOUS at 01:51

## 2020-02-02 RX ADMIN — MORPHINE SULFATE 4 MG: 2 INJECTION, SOLUTION INTRAMUSCULAR; INTRAVENOUS at 19:14

## 2020-02-02 RX ADMIN — ENOXAPARIN SODIUM 40 MG: 40 INJECTION SUBCUTANEOUS at 20:49

## 2020-02-02 RX ADMIN — SODIUM CHLORIDE, PRESERVATIVE FREE 10 ML: 5 INJECTION INTRAVENOUS at 20:49

## 2020-02-02 RX ADMIN — ONDANSETRON 4 MG: 2 INJECTION INTRAMUSCULAR; INTRAVENOUS at 01:50

## 2020-02-02 RX ADMIN — HYDROMORPHONE HYDROCHLORIDE 0.5 MG: 1 INJECTION, SOLUTION INTRAMUSCULAR; INTRAVENOUS; SUBCUTANEOUS at 04:29

## 2020-02-02 RX ADMIN — SODIUM CHLORIDE 100 ML/HR: 9 INJECTION, SOLUTION INTRAVENOUS at 01:51

## 2020-02-02 RX ADMIN — ENOXAPARIN SODIUM 40 MG: 40 INJECTION SUBCUTANEOUS at 12:58

## 2020-02-02 RX ADMIN — MORPHINE SULFATE 4 MG: 2 INJECTION, SOLUTION INTRAMUSCULAR; INTRAVENOUS at 12:58

## 2020-02-02 RX ADMIN — OXYCODONE HYDROCHLORIDE AND ACETAMINOPHEN 1 TABLET: 7.5; 325 TABLET ORAL at 20:54

## 2020-02-02 RX ADMIN — SODIUM CHLORIDE 100 ML/HR: 9 INJECTION, SOLUTION INTRAVENOUS at 22:15

## 2020-02-02 RX ADMIN — ACETAMINOPHEN 650 MG: 325 TABLET, FILM COATED ORAL at 08:11

## 2020-02-02 RX ADMIN — MORPHINE SULFATE 4 MG: 2 INJECTION, SOLUTION INTRAMUSCULAR; INTRAVENOUS at 08:41

## 2020-02-02 RX ADMIN — OXYCODONE HYDROCHLORIDE AND ACETAMINOPHEN 1 TABLET: 7.5; 325 TABLET ORAL at 16:15

## 2020-02-02 RX ADMIN — ONDANSETRON 4 MG: 2 INJECTION INTRAMUSCULAR; INTRAVENOUS at 16:15

## 2020-02-03 LAB
ANION GAP SERPL CALCULATED.3IONS-SCNC: 15.5 MMOL/L (ref 5–15)
BUN BLD-MCNC: 5 MG/DL (ref 6–20)
BUN/CREAT SERPL: 6.9 (ref 7–25)
CALCIUM SPEC-SCNC: 7.9 MG/DL (ref 8.6–10.5)
CHLORIDE SERPL-SCNC: 103 MMOL/L (ref 98–107)
CO2 SERPL-SCNC: 23.5 MMOL/L (ref 22–29)
CREAT BLD-MCNC: 0.72 MG/DL (ref 0.57–1)
DEPRECATED RDW RBC AUTO: 41.9 FL (ref 37–54)
ERYTHROCYTE [DISTWIDTH] IN BLOOD BY AUTOMATED COUNT: 12.5 % (ref 12.3–15.4)
GFR SERPL CREATININE-BSD FRML MDRD: 86 ML/MIN/1.73
GLUCOSE BLD-MCNC: 105 MG/DL (ref 65–99)
HCT VFR BLD AUTO: 35 % (ref 34–46.6)
HGB BLD-MCNC: 12 G/DL (ref 12–15.9)
MCH RBC QN AUTO: 32 PG (ref 26.6–33)
MCHC RBC AUTO-ENTMCNC: 34.3 G/DL (ref 31.5–35.7)
MCV RBC AUTO: 93.3 FL (ref 79–97)
PLATELET # BLD AUTO: 274 10*3/MM3 (ref 140–450)
PMV BLD AUTO: 10 FL (ref 6–12)
POTASSIUM BLD-SCNC: 4.1 MMOL/L (ref 3.5–5.2)
RBC # BLD AUTO: 3.75 10*6/MM3 (ref 3.77–5.28)
SODIUM BLD-SCNC: 142 MMOL/L (ref 136–145)
WBC NRBC COR # BLD: 11.34 10*3/MM3 (ref 3.4–10.8)

## 2020-02-03 PROCEDURE — 25010000002 PIPERACILLIN SOD-TAZOBACTAM PER 1 G: Performed by: HOSPITALIST

## 2020-02-03 PROCEDURE — 85027 COMPLETE CBC AUTOMATED: CPT | Performed by: HOSPITALIST

## 2020-02-03 PROCEDURE — 94799 UNLISTED PULMONARY SVC/PX: CPT

## 2020-02-03 PROCEDURE — 25010000002 ONDANSETRON PER 1 MG: Performed by: HOSPITALIST

## 2020-02-03 PROCEDURE — 25010000002 MORPHINE PER 10 MG: Performed by: HOSPITALIST

## 2020-02-03 PROCEDURE — 80048 BASIC METABOLIC PNL TOTAL CA: CPT | Performed by: HOSPITALIST

## 2020-02-03 PROCEDURE — 99231 SBSQ HOSP IP/OBS SF/LOW 25: CPT | Performed by: SURGERY

## 2020-02-03 RX ADMIN — TAZOBACTAM SODIUM AND PIPERACILLIN SODIUM 4.5 G: 500; 4 INJECTION, SOLUTION INTRAVENOUS at 00:14

## 2020-02-03 RX ADMIN — TAZOBACTAM SODIUM AND PIPERACILLIN SODIUM 4.5 G: 500; 4 INJECTION, SOLUTION INTRAVENOUS at 08:15

## 2020-02-03 RX ADMIN — MORPHINE SULFATE 4 MG: 2 INJECTION, SOLUTION INTRAMUSCULAR; INTRAVENOUS at 05:06

## 2020-02-03 RX ADMIN — SODIUM CHLORIDE 100 ML/HR: 9 INJECTION, SOLUTION INTRAVENOUS at 18:25

## 2020-02-03 RX ADMIN — ONDANSETRON 4 MG: 2 INJECTION INTRAMUSCULAR; INTRAVENOUS at 05:06

## 2020-02-03 RX ADMIN — ONDANSETRON 4 MG: 2 INJECTION INTRAMUSCULAR; INTRAVENOUS at 11:33

## 2020-02-03 RX ADMIN — OXYCODONE HYDROCHLORIDE AND ACETAMINOPHEN 1 TABLET: 7.5; 325 TABLET ORAL at 03:47

## 2020-02-03 RX ADMIN — ACETAMINOPHEN 650 MG: 325 TABLET, FILM COATED ORAL at 20:20

## 2020-02-03 RX ADMIN — TAZOBACTAM SODIUM AND PIPERACILLIN SODIUM 4.5 G: 500; 4 INJECTION, SOLUTION INTRAVENOUS at 16:53

## 2020-02-03 RX ADMIN — SODIUM CHLORIDE 100 ML/HR: 9 INJECTION, SOLUTION INTRAVENOUS at 08:26

## 2020-02-03 RX ADMIN — TAZOBACTAM SODIUM AND PIPERACILLIN SODIUM 4.5 G: 500; 4 INJECTION, SOLUTION INTRAVENOUS at 22:57

## 2020-02-03 RX ADMIN — MORPHINE SULFATE 4 MG: 2 INJECTION, SOLUTION INTRAMUSCULAR; INTRAVENOUS at 11:34

## 2020-02-04 VITALS
HEIGHT: 62 IN | WEIGHT: 293 LBS | RESPIRATION RATE: 18 BRPM | SYSTOLIC BLOOD PRESSURE: 117 MMHG | HEART RATE: 71 BPM | DIASTOLIC BLOOD PRESSURE: 74 MMHG | BODY MASS INDEX: 53.92 KG/M2 | TEMPERATURE: 97.5 F | OXYGEN SATURATION: 97 %

## 2020-02-04 LAB
ANION GAP SERPL CALCULATED.3IONS-SCNC: 11.3 MMOL/L (ref 5–15)
BASOPHILS # BLD AUTO: 0.02 10*3/MM3 (ref 0–0.2)
BASOPHILS NFR BLD AUTO: 0.3 % (ref 0–1.5)
BUN BLD-MCNC: 4 MG/DL (ref 6–20)
BUN/CREAT SERPL: 6.8 (ref 7–25)
CALCIUM SPEC-SCNC: 8.2 MG/DL (ref 8.6–10.5)
CHLORIDE SERPL-SCNC: 102 MMOL/L (ref 98–107)
CO2 SERPL-SCNC: 23.7 MMOL/L (ref 22–29)
CREAT BLD-MCNC: 0.59 MG/DL (ref 0.57–1)
DEPRECATED RDW RBC AUTO: 42.8 FL (ref 37–54)
EOSINOPHIL # BLD AUTO: 0.31 10*3/MM3 (ref 0–0.4)
EOSINOPHIL NFR BLD AUTO: 4.2 % (ref 0.3–6.2)
ERYTHROCYTE [DISTWIDTH] IN BLOOD BY AUTOMATED COUNT: 12.4 % (ref 12.3–15.4)
GFR SERPL CREATININE-BSD FRML MDRD: 108 ML/MIN/1.73
GLUCOSE BLD-MCNC: 84 MG/DL (ref 65–99)
HCT VFR BLD AUTO: 32.4 % (ref 34–46.6)
HGB BLD-MCNC: 10.9 G/DL (ref 12–15.9)
IMM GRANULOCYTES # BLD AUTO: 0.04 10*3/MM3 (ref 0–0.05)
IMM GRANULOCYTES NFR BLD AUTO: 0.5 % (ref 0–0.5)
LYMPHOCYTES # BLD AUTO: 2.21 10*3/MM3 (ref 0.7–3.1)
LYMPHOCYTES NFR BLD AUTO: 30.2 % (ref 19.6–45.3)
MCH RBC QN AUTO: 31.8 PG (ref 26.6–33)
MCHC RBC AUTO-ENTMCNC: 33.6 G/DL (ref 31.5–35.7)
MCV RBC AUTO: 94.5 FL (ref 79–97)
MONOCYTES # BLD AUTO: 0.53 10*3/MM3 (ref 0.1–0.9)
MONOCYTES NFR BLD AUTO: 7.2 % (ref 5–12)
NEUTROPHILS # BLD AUTO: 4.22 10*3/MM3 (ref 1.7–7)
NEUTROPHILS NFR BLD AUTO: 57.6 % (ref 42.7–76)
NRBC BLD AUTO-RTO: 0 /100 WBC (ref 0–0.2)
PLATELET # BLD AUTO: 286 10*3/MM3 (ref 140–450)
PMV BLD AUTO: 9.3 FL (ref 6–12)
POTASSIUM BLD-SCNC: 3.5 MMOL/L (ref 3.5–5.2)
RBC # BLD AUTO: 3.43 10*6/MM3 (ref 3.77–5.28)
SODIUM BLD-SCNC: 137 MMOL/L (ref 136–145)
WBC NRBC COR # BLD: 7.33 10*3/MM3 (ref 3.4–10.8)

## 2020-02-04 PROCEDURE — 99254 IP/OBS CNSLTJ NEW/EST MOD 60: CPT | Performed by: INTERNAL MEDICINE

## 2020-02-04 PROCEDURE — 99231 SBSQ HOSP IP/OBS SF/LOW 25: CPT | Performed by: SURGERY

## 2020-02-04 PROCEDURE — 80048 BASIC METABOLIC PNL TOTAL CA: CPT | Performed by: INTERNAL MEDICINE

## 2020-02-04 PROCEDURE — 85025 COMPLETE CBC W/AUTO DIFF WBC: CPT | Performed by: INTERNAL MEDICINE

## 2020-02-04 PROCEDURE — 25010000002 PIPERACILLIN SOD-TAZOBACTAM PER 1 G: Performed by: INTERNAL MEDICINE

## 2020-02-04 RX ORDER — METRONIDAZOLE 500 MG/1
500 TABLET ORAL EVERY 8 HOURS SCHEDULED
Qty: 24 TABLET | Refills: 0 | Status: SHIPPED | OUTPATIENT
Start: 2020-02-04 | End: 2020-02-10 | Stop reason: SDUPTHER

## 2020-02-04 RX ORDER — CIPROFLOXACIN 500 MG/1
500 TABLET, FILM COATED ORAL EVERY 12 HOURS SCHEDULED
Qty: 16 TABLET | Refills: 0 | Status: SHIPPED | OUTPATIENT
Start: 2020-02-04 | End: 2020-02-10 | Stop reason: SDUPTHER

## 2020-02-04 RX ORDER — CIPROFLOXACIN 500 MG/1
500 TABLET, FILM COATED ORAL EVERY 12 HOURS SCHEDULED
Status: DISCONTINUED | OUTPATIENT
Start: 2020-02-04 | End: 2020-02-04 | Stop reason: HOSPADM

## 2020-02-04 RX ORDER — METRONIDAZOLE 500 MG/1
500 TABLET ORAL EVERY 8 HOURS SCHEDULED
Status: DISCONTINUED | OUTPATIENT
Start: 2020-02-04 | End: 2020-02-04 | Stop reason: HOSPADM

## 2020-02-04 RX ADMIN — SODIUM CHLORIDE 100 ML/HR: 9 INJECTION, SOLUTION INTRAVENOUS at 03:14

## 2020-02-04 RX ADMIN — TAZOBACTAM SODIUM AND PIPERACILLIN SODIUM 4.5 G: 500; 4 INJECTION, SOLUTION INTRAVENOUS at 08:45

## 2020-02-10 ENCOUNTER — TELEPHONE (OUTPATIENT)
Dept: SURGERY | Facility: CLINIC | Age: 50
End: 2020-02-10

## 2020-02-10 RX ORDER — CIPROFLOXACIN 500 MG/1
500 TABLET, FILM COATED ORAL EVERY 12 HOURS SCHEDULED
Qty: 20 TABLET | Refills: 0 | Status: SHIPPED | OUTPATIENT
Start: 2020-02-10 | End: 2020-02-20

## 2020-02-10 RX ORDER — METRONIDAZOLE 500 MG/1
500 TABLET ORAL EVERY 8 HOURS SCHEDULED
Qty: 30 TABLET | Refills: 0 | Status: SHIPPED | OUTPATIENT
Start: 2020-02-10 | End: 2020-02-20

## 2020-02-10 NOTE — TELEPHONE ENCOUNTER
Seen for diverticulitis.  [place] and you told her to call for antibiotics.  She is on Cipro and Flagyl

## 2020-03-06 ENCOUNTER — OFFICE VISIT (OUTPATIENT)
Dept: SURGERY | Facility: CLINIC | Age: 50
End: 2020-03-06

## 2020-03-06 VITALS
BODY MASS INDEX: 53.92 KG/M2 | TEMPERATURE: 98.7 F | HEART RATE: 83 BPM | WEIGHT: 293 LBS | HEIGHT: 62 IN | OXYGEN SATURATION: 98 %

## 2020-03-06 DIAGNOSIS — K57.92 DIVERTICULITIS: Primary | ICD-10-CM

## 2020-03-06 PROCEDURE — 99204 OFFICE O/P NEW MOD 45 MIN: CPT | Performed by: COLON & RECTAL SURGERY

## 2020-03-06 RX ORDER — DESONIDE 0.5 MG/G
CREAM TOPICAL 2 TIMES DAILY
COMMUNITY
End: 2022-05-06 | Stop reason: SDUPTHER

## 2020-03-06 RX ORDER — ALBUTEROL SULFATE 2.5 MG/3ML
SOLUTION RESPIRATORY (INHALATION)
COMMUNITY
Start: 2019-12-22

## 2020-03-06 RX ORDER — DOXYCYCLINE 100 MG/1
CAPSULE ORAL
COMMUNITY
Start: 2020-01-11 | End: 2020-03-06

## 2020-03-06 RX ORDER — AMOXICILLIN AND CLAVULANATE POTASSIUM 875; 125 MG/1; MG/1
TABLET, FILM COATED ORAL
COMMUNITY
Start: 2020-01-28 | End: 2020-03-06

## 2020-03-06 RX ORDER — PREDNISONE 20 MG/1
TABLET ORAL
COMMUNITY
Start: 2020-01-11 | End: 2020-03-06

## 2020-03-06 RX ORDER — SODIUM CHLORIDE, SODIUM LACTATE, POTASSIUM CHLORIDE, CALCIUM CHLORIDE 600; 310; 30; 20 MG/100ML; MG/100ML; MG/100ML; MG/100ML
30 INJECTION, SOLUTION INTRAVENOUS CONTINUOUS
Status: CANCELLED | OUTPATIENT
Start: 2020-05-01

## 2020-03-06 RX ORDER — PREDNISONE 10 MG/1
TABLET ORAL
COMMUNITY
Start: 2020-01-28 | End: 2020-03-06

## 2020-03-06 NOTE — PROGRESS NOTES
Cari Watson is a 49 y.o. female who is seen as a consult for second opinion for diverticulitis  HPI:    Pt c/o recurrent episodes of diverticulitis.  She has never had an abscess or perforation.  Her first episode was in 2010  She has had 3 episodes of diverticulitis in the past year requiring admission.  She has had 2 rounds IV zosyn with 2 different admissions    2 rounds cipro and flagyl  10 days augmentin  10 days doxy  1 injection rifampin  2 rounds steroids  1 round azithromycin    When she has an episode of diverticulitis, she begins with fatigue, then rapid onset of LLQ pain.  She gets diarrhea, but her BMs are irregular.  No blood per rectum.  She gets a fever, TMax 102    She states she has struggled with constipation since 2010.  In the past 6-8 months, she has had diarrhea  She takes miralax daily, and has 2 BMs per day    Pt states her diverticulitis episodes are always linked to certain foods such as apples, almonds popcorn    Most recent colonoscopy 2014 Dr. Wilmar Machuca: tics, hyperplastic polyp rectum    +hx fistulotomy 2015    Pt states she thinks she has an autoimmune issue due to multiple allergies  She has seen an allergist at [facility] in the past 5 years: had retesting    She has previously seen a rheumatologist, but pt states markers were negative    Past Medical History:   Diagnosis Date   • Allergic dermatitis 01/28/2020   • Anal fistula 03/2015    S/P FISTULOTOMY   • Anxiety    • Arthritis    • Back pain    • Cervical adenopathy 04/2017   • Chronic fatigue    • Colon polyps    • Diverticulitis 02/01/2020    ADMITTED TO [facility]   • Diverticulitis 11/17/2019    ADMITTED TO [facility]   • Diverticulitis 06/19/2019    ADMITTED TO [facility]   • Diverticulitis 12/21/2012   • Diverticulitis 04/08/2013   • DUB (dysfunctional uterine bleeding) 07/2015   • Eczema    • Environmental allergies    • Exposure to hepatitis C    • Fibrocystic breast    • Fluid retention in legs    • Folliculitis    • GERD  (gastroesophageal reflux disease)    • Hematuria 10/2018   • Hemorrhoids    • Hormone replacement therapy (HRT)     VIVELLE PATCH   • Hypersomnia    • Hypertension    • IBS (irritable bowel syndrome)    • Impacted cerumen    • 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 JOHN JOAQUIN JR   • Leg cramps 09/2013   • Nephrolithiasis    • JR on CPAP     FOLLOWED BY DR. ALEXX FLETCHER   • Panic attacks    • Perirectal abscess    • Psoriasis    • Rectal bleeding    • Sepsis without acute organ dysfunction (CMS/HCC) 2/2/2020   • Wheezing 12/2019   • Yeast infection     CHRONIC     Past Surgical History:   Procedure Laterality Date   • ANAL FISTULOTOMY N/A 03/12/2015    Dr. Jazz Mccartney AT [facility]   • BILATERAL SALPINGO OOPHORECTOMY Bilateral 01/18/2016    WITH HYSTERECTOMY, DR. AMINA CHOUDHURY AT [facility]   • COLONOSCOPY N/A 12/11/2014    SIGMOID DIVERTICULOSIS, 5 MM HYPERPLASTIC POLYP IN RECTUM, RESCOPE IN 5 YRS, RANDOM COLON BX BENIGN, DR. KERON CLARK AT [place]   • COLONOSCOPY W/ POLYPECTOMY N/A 06/23/2010    DIVERTICULOSIS, DIVERTICULITIS IN SIGMOID, SMALL POLYP IN RECTOSIGMOID, DR. JESUS MANUEL BLOOD AT [facility]   • ENDOSCOPY N/A 12/11/2014    ENTIRE EGD WNL, RANDOM BIOPSIES BENIGN, DR. KERON CLARK AT [place]   • HEMORRHOIDECTOMY N/A 03/12/2015    Internal / external hemorrhoidectomy x2 - Dr. Jazz Mccartney AT [facility]   • HYSTERECTOMY N/A 01/18/2016    LAPAROSCOPIC HYSTERECTOMY WITH BSO, DR. AMINA CHOUDHURY AT [facility]   • TUBAL ABDOMINAL LIGATION Bilateral        Social History:   reports that she has been smoking cigarettes. She has been smoking about 0.50 packs per day. She has never used smokeless tobacco. She reports that she does not drink alcohol or use drugs.    Marriage status:     Family History   Problem Relation Age of Onset   • Breast cancer Maternal Grandmother    • Cancer Maternal Grandmother    • Diabetes Mother    • Colon polyps Mother    • Cancer Father         Prostate.   • Heart  disease Father    • Diabetes Father    • Stroke Father          Current Outpatient Medications:   •  albuterol (PROVENTIL) (2.5 MG/3ML) 0.083% nebulizer solution, AVN Q 4 H PRF WHZ, Disp: , Rfl:   •  Cyanocobalamin (VITAMIN B 12 PO), Take  by mouth., Disp: , Rfl:   •  desonide (DESOWEN) 0.05 % cream, Apply  topically to the appropriate area as directed 2 (Two) Times a Day., Disp: , Rfl:   •  estradiol (MINIVELLE, VIVELLE-DOT) 0.1 MG/24HR patch, Place 1 patch on the skin as directed by provider 2 (Two) Times a Week. (Patient taking differently: Place 1 patch on the skin as directed by provider 2 (Two) Times a Week. Patient placed a new patch yesterday 11/16/2019), Disp: 24 patch, Rfl: 4  •  Polyethylene Glycol 3350 (MIRALAX PO), Take  by mouth., Disp: , Rfl:   •  Probiotic Product (PROBIOTIC DAILY PO), Take  by mouth., Disp: , Rfl:     Allergy  Shrimp (diagnostic); Soybean-containing drug products; Sulfa antibiotics; and Gluten meal    Review of Systems   Constitution: Positive for decreased appetite. Negative for weight gain.   HENT: Positive for congestion and hoarse voice. Negative for hearing loss.    Eyes: Negative for blurred vision, discharge and visual disturbance.   Cardiovascular: Positive for leg swelling. Negative for chest pain and cyanosis.   Respiratory: Positive for cough, shortness of breath and sleep disturbances due to breathing. Negative for snoring.    Endocrine: Negative for cold intolerance and heat intolerance.   Hematologic/Lymphatic: Does not bruise/bleed easily.   Skin: Positive for itching and poor wound healing. Negative for skin cancer.   Musculoskeletal: Positive for back pain. Negative for arthritis, joint pain and joint swelling.   Gastrointestinal: Positive for abdominal pain and change in bowel habit. Negative for bowel incontinence and constipation.   Genitourinary: Negative for bladder incontinence, dysuria and hematuria.   Neurological: Positive for excessive daytime sleepiness  and weakness. Negative for brief paralysis, dizziness, focal weakness, headaches and light-headedness.   Psychiatric/Behavioral: Negative for altered mental status and hallucinations. The patient does not have insomnia.    Allergic/Immunologic: Positive for persistent infections. Negative for HIV exposure.   All other systems reviewed and are negative.      Vitals:    03/06/20 0956   Pulse: 83   Temp: 98.7 °F (37.1 °C)   SpO2: 98%     Body mass index is 56.7 kg/m².    Physical Exam   Constitutional: She is oriented to person, place, and time. She appears well-developed and well-nourished. No distress.   HENT:   Head: Normocephalic and atraumatic.   Nose: Nose normal.   Mouth/Throat: Oropharynx is clear and moist.   Eyes: Pupils are equal, round, and reactive to light. Conjunctivae and EOM are normal.   Neck: Normal range of motion. No tracheal deviation present.   Pulmonary/Chest: Effort normal and breath sounds normal. No respiratory distress.   Abdominal: Soft. She exhibits no distension.   Musculoskeletal: Normal range of motion. She exhibits no edema or deformity.   Neurological: She is alert and oriented to person, place, and time. No cranial nerve deficit. Coordination and gait normal.   Skin: Skin is warm and dry.   Psychiatric: She has a normal mood and affect. Her behavior is normal. Judgment normal.       Review of Medical Record:  I personally reviewed images CT a/p Feb 2020, Nov 2019, June 2019: acute diverticulitis left colon/proximal sigmoid colon    Assessment:  1. Diverticulitis    2. BMI 50.0-59.9, adult (CMS/[facility])        Plan:    Discussion with pt regarding recurrent diverticulitis.  As her last colonoscopy was 6 years ago, I recommend further evaluation with CT a/p with IV, po, and rectal contrast, and colonoscopy.  Discussed risks, benefits, and alternatives, and she wishes to proceed.    Discussed possible colon resection for recurrent diverticulitis.  Due to her BMI of 56.7, she is at increased  risk of perioperative complications.  Discussed recommend referral for medically-guided weight loss.  Pt states she wants to try to lose weight on her own, and plans to buy a treadmill soon.  She has lost 80 lbs in the past, when she was in her 20s.      Scribed for Jazz Mccartney MD by Anne Ortiz PA-C  3/6/2020   This patient was evaluated by me, recommendations made, documentation reviewed, edited, and revised by me, Jazz Mccartney MD

## 2020-04-16 ENCOUNTER — APPOINTMENT (OUTPATIENT)
Dept: CT IMAGING | Facility: HOSPITAL | Age: 50
End: 2020-04-16

## 2020-04-27 ENCOUNTER — TELEPHONE (OUTPATIENT)
Dept: OBSTETRICS AND GYNECOLOGY | Facility: CLINIC | Age: 50
End: 2020-04-27

## 2020-04-27 RX ORDER — AMOXICILLIN AND CLAVULANATE POTASSIUM 875; 125 MG/1; MG/1
1 TABLET, FILM COATED ORAL 2 TIMES DAILY
Qty: 14 TABLET | Refills: 0 | Status: SHIPPED | OUTPATIENT
Start: 2020-04-27 | End: 2020-05-04

## 2020-04-27 NOTE — TELEPHONE ENCOUNTER
Dr. Arredondo,     Patient called and stated she has a UTI and has been taking Cipro and it is not getting any better. She is requesting you call in something stronger.     Pharmacy has been confirmed - [facility]

## 2020-04-27 NOTE — TELEPHONE ENCOUNTER
Discussed antibiotic options.  The patient has taken a lot of Cipro lately because of her diverticulitis.  She states Macrobid usually does not help her.  She is allergic to Bactrim.  I recommended we try Augmentin and if she is still symptomatic she will probably need an office visit.  A prescription for Augmentin will be sent to her pharmacy.

## 2020-07-08 ENCOUNTER — TELEPHONE (OUTPATIENT)
Dept: OBSTETRICS AND GYNECOLOGY | Facility: CLINIC | Age: 50
End: 2020-07-08

## 2020-07-08 RX ORDER — TRIAMTERENE AND HYDROCHLOROTHIAZIDE 37.5; 25 MG/1; MG/1
1 TABLET ORAL DAILY
Qty: 30 TABLET | Refills: 2 | Status: SHIPPED | OUTPATIENT
Start: 2020-07-08 | End: 2022-07-07

## 2020-09-25 ENCOUNTER — APPOINTMENT (OUTPATIENT)
Dept: SLEEP MEDICINE | Facility: HOSPITAL | Age: 50
End: 2020-09-25

## 2020-10-05 ENCOUNTER — TELEPHONE (OUTPATIENT)
Dept: SURGERY | Facility: CLINIC | Age: 50
End: 2020-10-05

## 2020-10-05 NOTE — TELEPHONE ENCOUNTER
Pt. Comes up for overdue results for a CT was not done on 05/19/2020 she was a no show, CY was not done on 06/10/2020.    Please advice.    Thank you.

## 2021-01-06 DIAGNOSIS — Z78.0 MENOPAUSE: ICD-10-CM

## 2021-01-06 DIAGNOSIS — Z79.890 HORMONE REPLACEMENT THERAPY (HRT): ICD-10-CM

## 2021-01-06 RX ORDER — ESTRADIOL 0.1 MG/D
1 FILM, EXTENDED RELEASE TRANSDERMAL 2 TIMES WEEKLY
Qty: 24 PATCH | Refills: 0 | Status: SHIPPED | OUTPATIENT
Start: 2021-01-07 | End: 2021-04-12

## 2021-04-10 DIAGNOSIS — Z79.890 HORMONE REPLACEMENT THERAPY (HRT): ICD-10-CM

## 2021-04-10 DIAGNOSIS — Z78.0 MENOPAUSE: ICD-10-CM

## 2021-04-12 RX ORDER — ESTRADIOL 0.1 MG/D
FILM, EXTENDED RELEASE TRANSDERMAL
Qty: 24 PATCH | Refills: 0 | Status: SHIPPED | OUTPATIENT
Start: 2021-04-12 | End: 2021-05-04 | Stop reason: SDUPTHER

## 2021-05-04 ENCOUNTER — APPOINTMENT (OUTPATIENT)
Dept: WOMENS IMAGING | Facility: HOSPITAL | Age: 51
End: 2021-05-04

## 2021-05-04 ENCOUNTER — OFFICE VISIT (OUTPATIENT)
Dept: OBSTETRICS AND GYNECOLOGY | Facility: CLINIC | Age: 51
End: 2021-05-04

## 2021-05-04 ENCOUNTER — PROCEDURE VISIT (OUTPATIENT)
Dept: OBSTETRICS AND GYNECOLOGY | Facility: CLINIC | Age: 51
End: 2021-05-04

## 2021-05-04 VITALS
DIASTOLIC BLOOD PRESSURE: 78 MMHG | HEIGHT: 62 IN | BODY MASS INDEX: 53.92 KG/M2 | SYSTOLIC BLOOD PRESSURE: 120 MMHG | WEIGHT: 293 LBS

## 2021-05-04 DIAGNOSIS — Z78.0 MENOPAUSE: ICD-10-CM

## 2021-05-04 DIAGNOSIS — E66.01 MORBID OBESITY WITH BMI OF 60.0-69.9, ADULT (HCC): ICD-10-CM

## 2021-05-04 DIAGNOSIS — Z01.419 WELL WOMAN EXAM: Primary | ICD-10-CM

## 2021-05-04 DIAGNOSIS — Z79.890 HORMONE REPLACEMENT THERAPY (HRT): ICD-10-CM

## 2021-05-04 DIAGNOSIS — Z12.31 ENCOUNTER FOR SCREENING MAMMOGRAM FOR MALIGNANT NEOPLASM OF BREAST: ICD-10-CM

## 2021-05-04 DIAGNOSIS — Z12.31 VISIT FOR SCREENING MAMMOGRAM: Primary | ICD-10-CM

## 2021-05-04 PROCEDURE — 77067 SCR MAMMO BI INCL CAD: CPT | Performed by: RADIOLOGY

## 2021-05-04 PROCEDURE — 77063 BREAST TOMOSYNTHESIS BI: CPT | Performed by: RADIOLOGY

## 2021-05-04 PROCEDURE — 77067 SCR MAMMO BI INCL CAD: CPT | Performed by: STUDENT IN AN ORGANIZED HEALTH CARE EDUCATION/TRAINING PROGRAM

## 2021-05-04 PROCEDURE — 99396 PREV VISIT EST AGE 40-64: CPT | Performed by: STUDENT IN AN ORGANIZED HEALTH CARE EDUCATION/TRAINING PROGRAM

## 2021-05-04 PROCEDURE — 77063 BREAST TOMOSYNTHESIS BI: CPT | Performed by: STUDENT IN AN ORGANIZED HEALTH CARE EDUCATION/TRAINING PROGRAM

## 2021-05-04 RX ORDER — ESTRADIOL 0.1 MG/D
1 FILM, EXTENDED RELEASE TRANSDERMAL 2 TIMES WEEKLY
Qty: 24 PATCH | Refills: 3 | Status: SHIPPED | OUTPATIENT
Start: 2021-05-06 | End: 2022-01-25

## 2021-05-04 NOTE — PROGRESS NOTES
GYN Annual Exam     CC- Here for annual exam.     Cari Watson is a 50 y.o. female who presents for annual well woman exam. She is on estradiol patches for HRT. She denies vasomotor symptoms or vaginal bleeding. She has no complaints today. She is embarrassed regarding her weight and initially did not want to complete the exam because of this.      She reports that she has a lump in her left breast that has been present and unchanged for yeasts.     OB History        1    Para   1    Term   1            AB        Living           SAB        TAB        Ectopic        Molar        Multiple        Live Births                  Not currently sexually active   Current contraception: status post hysterectomy  History of abnormal Pap smear: no  Family history of uterine, colon or ovarian cancer: no  History of abnormal mammogram: yes - reports abnormal mammogram but have been following with imaging  Family history of breast cancer: yes - MGM had breast cancer.  Last Pap : 2019- NILM   Last mammogram: 2019- BIRADS-2.   Last colonoscopy: - diverticulitis and colon polyps.   Last DEXA: N/a  Parental Hip Fracture: denies     Past Medical History:   Diagnosis Date   • Allergic dermatitis 2020   • Anal fistula 2015    S/P FISTULOTOMY   • Anxiety    • Arthritis    • Back pain    • Cervical adenopathy 2017   • Chronic fatigue    • Colon polyps    • Diverticulitis 2020    ADMITTED TO [facility]   • Diverticulitis 2019    ADMITTED TO [facility]   • Diverticulitis 2019    ADMITTED TO [facility]   • Diverticulitis 2012   • Diverticulitis 2013   • DUB (dysfunctional uterine bleeding) 2015   • Eczema    • Environmental allergies    • Exposure to hepatitis C    • Fibrocystic breast    • Fluid retention in legs    • Folliculitis    • GERD (gastroesophageal reflux disease)    • Hematuria 10/2018   • Hemorrhoids    • Hormone replacement therapy (HRT)     VIVELLE PATCH   • Hypersomnia    •  Hypertension    • IBS (irritable bowel syndrome)    • Impacted cerumen    • 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 JOHN JOAQUIN JR   • Leg cramps 09/2013   • Nephrolithiasis    • JR on CPAP     FOLLOWED BY DR. ALEXX FLETCHER   • Panic attacks    • Perirectal abscess    • Psoriasis    • Rectal bleeding    • Sepsis without acute organ dysfunction (CMS/HCC) 2/2/2020   • Wheezing 12/2019   • Yeast infection     CHRONIC       Past Surgical History:   Procedure Laterality Date   • ANAL FISTULOTOMY N/A 03/12/2015    Dr. Jazz Mccartney AT [facility]   • BILATERAL SALPINGO OOPHORECTOMY Bilateral 01/18/2016    WITH HYSTERECTOMY, DR. AMINA CHOUDHURY AT [facility]   • COLONOSCOPY N/A 12/11/2014    SIGMOID DIVERTICULOSIS, 5 MM HYPERPLASTIC POLYP IN RECTUM, RESCOPE IN 5 YRS, RANDOM COLON BX BENIGN, DR. KERON CLARK AT [place]   • COLONOSCOPY W/ POLYPECTOMY N/A 06/23/2010    DIVERTICULOSIS, DIVERTICULITIS IN SIGMOID, SMALL POLYP IN RECTOSIGMOID, DR. JESUS MANUEL BLOOD AT [facility]   • ENDOSCOPY N/A 12/11/2014    ENTIRE EGD WNL, RANDOM BIOPSIES BENIGN, DR. KERON CLARK AT [place]   • HEMORRHOIDECTOMY N/A 03/12/2015    Internal / external hemorrhoidectomy x2 - Dr. Jazz Mccartney AT [facility]   • HYSTERECTOMY N/A 01/18/2016    LAPAROSCOPIC HYSTERECTOMY WITH BSO, DR. AMINA CHOUDHURY AT [facility]   • TUBAL ABDOMINAL LIGATION Bilateral          Current Outpatient Medications:   •  Breo Ellipta 200-25 MCG/INH inhaler, Inhale 1 puff Daily., Disp: , Rfl:   •  Cyanocobalamin (VITAMIN B 12 PO), Take  by mouth., Disp: , Rfl:   •  desonide (DESOWEN) 0.05 % cream, Apply  topically to the appropriate area as directed 2 (Two) Times a Day., Disp: , Rfl:   •  estradiol (VIVELLE-DOT) 0.1 MG/24HR patch, PLACE 1 PATCH ON THE SKIN AS DIRECTED BY PROVIDER TWO TIMES A WEEK FOR 90 DAYS, Disp: 24 patch, Rfl: 0  •  Polyethylene Glycol 3350 (MIRALAX PO), Take  by mouth., Disp: , Rfl:   •  Probiotic Product (PROBIOTIC DAILY PO), Take  by mouth., Disp: ,  "Rfl:   •  albuterol (PROVENTIL) (2.5 MG/3ML) 0.083% nebulizer solution, AVN Q 4 H PRF [facility], Disp: , Rfl:   •  triamterene-hydrochlorothiazide (MAXZIDE-25) 37.5-25 MG per tablet, Take 1 tablet by mouth Daily., Disp: 30 tablet, Rfl: 2    Allergies   Allergen Reactions   • Shrimp (Diagnostic) Swelling   • Soybean-Containing Drug Products Diarrhea   • Sulfa Antibiotics Swelling   • Gluten Meal Rash       Social History     Tobacco Use   • Smoking status: Current Every Day Smoker     Packs/day: 0.50     Types: Cigarettes   • Smokeless tobacco: Never Used   Substance Use Topics   • Alcohol use: No   • Drug use: Never       Family History   Problem Relation Age of Onset   • Breast cancer Maternal Grandmother    • Cancer Maternal Grandmother    • Diabetes Mother    • Colon polyps Mother    • Cancer Father         Prostate.   • Heart disease Father    • Diabetes Father    • Stroke Father        Review of Systems   All other systems reviewed and are negative.      /78   Ht 157.5 cm (62\")   Wt (!) 149 kg (329 lb)   BMI 60.17 kg/m²     Physical Exam  Vitals reviewed. Exam conducted with a chaperone present.   Constitutional:       Appearance: Normal appearance. She is obese.   HENT:      Head: Normocephalic and atraumatic.      Right Ear: External ear normal.      Left Ear: External ear normal.   Eyes:      Extraocular Movements: Extraocular movements intact.      Pupils: Pupils are equal, round, and reactive to light.   Cardiovascular:      Rate and Rhythm: Normal rate and regular rhythm.   Pulmonary:      Effort: Pulmonary effort is normal. No respiratory distress.   Chest:      Breasts:         Right: Normal. No swelling, bleeding, inverted nipple, mass, nipple discharge, skin change or tenderness.         Left: Mass present. No swelling, bleeding, inverted nipple, nipple discharge, skin change or tenderness.      Comments: Approximately 3 cm mobile mass deep to the left nipple in the left lower quadrant that is " non-tender.   Abdominal:      General: There is no distension.      Palpations: Abdomen is soft.      Tenderness: There is no abdominal tenderness. There is no guarding or rebound.   Genitourinary:     General: Normal vulva.      Exam position: Lithotomy position.      Labia:         Right: No rash, tenderness, lesion or injury.         Left: No rash, tenderness, lesion or injury.       Urethra: No prolapse or urethral swelling.      Vagina: Normal. No vaginal discharge, erythema, tenderness, bleeding or lesions.      Cervix: Normal.      Uterus: Absent.       Adnexa: Right adnexa normal and left adnexa normal.        Right: No mass, tenderness or fullness.          Left: No mass, tenderness or fullness.        Comments: Uterus surgically absent.   Musculoskeletal:         General: No deformity. Normal range of motion.      Cervical back: Normal range of motion and neck supple.   Lymphadenopathy:      Upper Body:      Right upper body: No axillary adenopathy.      Left upper body: No axillary adenopathy.      Lower Body: No right inguinal adenopathy. No left inguinal adenopathy.   Skin:     General: Skin is warm and dry.   Neurological:      General: No focal deficit present.      Mental Status: She is alert and oriented to person, place, and time.   Psychiatric:         Mood and Affect: Mood normal.         Behavior: Behavior normal.       Assessment     1) GYN annual well woman exam.   2) Breast cancer screening   3) Morbid obesity- BMI 60   4) Menopause on HRT      Plan     1) Breast Health - Clinical breast exam & mammogram yearly, Self breast awareness monthly. Mammogram performed for breast cancer screening today.   2) Pap - Up to date. Recommend repeat pap smear with cotesting in 2022 per ASCCP guidelines.   3) Smoking status- Current smoker of 1 ppd. Encouraged smoking cessation but patient not ready to quit.    4) Colon health - screening colonoscopy recommended if not up to date  5) Bone health - Weight  bearing exercise, dietary calcium recommendations and vitamin D reviewed.   6) Activity recommends - Adult 150-300 min/week of multi-component physical activities that include balance training, aerobic and physical strengthening.    7) Menopause on HRT- Patient wishes to continue on HRT with estradiol patches and prescription sent to her pharmacy.   8) Follow up prn and one year      Radha Gusman MD

## 2022-01-25 DIAGNOSIS — Z78.0 MENOPAUSE: ICD-10-CM

## 2022-01-25 DIAGNOSIS — Z79.890 HORMONE REPLACEMENT THERAPY (HRT): ICD-10-CM

## 2022-01-25 RX ORDER — ESTRADIOL 0.1 MG/D
FILM, EXTENDED RELEASE TRANSDERMAL
Qty: 24 PATCH | Refills: 3 | Status: SHIPPED | OUTPATIENT
Start: 2022-01-25

## 2022-03-18 ENCOUNTER — HOSPITAL ENCOUNTER (OUTPATIENT)
Dept: WOMENS IMAGING | Age: 52
Discharge: HOME | End: 2022-03-18
Payer: MEDICAID

## 2022-03-18 DIAGNOSIS — Z12.31 BREAST CANCER SCREENING BY MAMMOGRAM: ICD-10-CM

## 2022-03-18 PROCEDURE — 77063 BREAST TOMOSYNTHESIS BI: CPT

## 2022-04-11 ENCOUNTER — TELEPHONE (OUTPATIENT)
Dept: OBSTETRICS AND GYNECOLOGY | Facility: CLINIC | Age: 52
End: 2022-04-11

## 2022-04-11 DIAGNOSIS — B37.9 YEAST INFECTION: Primary | ICD-10-CM

## 2022-04-11 RX ORDER — FLUCONAZOLE 100 MG/1
100 TABLET ORAL DAILY
Qty: 3 TABLET | Refills: 0 | Status: SHIPPED | OUTPATIENT
Start: 2022-04-11 | End: 2022-04-19

## 2022-04-11 NOTE — TELEPHONE ENCOUNTER
Patient stated it's helpful if she can get a lower dosage to take for a period of 3 days.   Patient stated that tends to work best for her when she gets yeast infections.

## 2022-04-12 NOTE — TELEPHONE ENCOUNTER
Left Vm stating that patients Rx has been sent to her pharmacy and if she has any questions, she can call office back.

## 2022-04-19 DIAGNOSIS — B37.9 YEAST INFECTION: ICD-10-CM

## 2022-04-19 RX ORDER — FLUCONAZOLE 100 MG/1
100 TABLET ORAL DAILY
Qty: 3 TABLET | Refills: 0 | Status: SHIPPED | OUTPATIENT
Start: 2022-04-19 | End: 2022-04-22

## 2022-05-06 ENCOUNTER — OFFICE VISIT (OUTPATIENT)
Dept: FAMILY MEDICINE CLINIC | Facility: CLINIC | Age: 52
End: 2022-05-06

## 2022-05-06 VITALS
RESPIRATION RATE: 16 BRPM | DIASTOLIC BLOOD PRESSURE: 80 MMHG | TEMPERATURE: 98.4 F | HEART RATE: 77 BPM | HEIGHT: 62 IN | SYSTOLIC BLOOD PRESSURE: 133 MMHG | WEIGHT: 293 LBS | BODY MASS INDEX: 53.92 KG/M2 | OXYGEN SATURATION: 97 %

## 2022-05-06 VITALS
RESPIRATION RATE: 16 BRPM | DIASTOLIC BLOOD PRESSURE: 80 MMHG | OXYGEN SATURATION: 97 % | TEMPERATURE: 98.6 F | BODY MASS INDEX: 53.92 KG/M2 | HEART RATE: 77 BPM | SYSTOLIC BLOOD PRESSURE: 133 MMHG | WEIGHT: 293 LBS | HEIGHT: 62 IN

## 2022-05-06 DIAGNOSIS — Z53.21 PATIENT LEFT WITHOUT BEING SEEN: ICD-10-CM

## 2022-05-06 DIAGNOSIS — R73.03 PREDIABETES: Chronic | ICD-10-CM

## 2022-05-06 DIAGNOSIS — Z11.59 ENCOUNTER FOR HEPATITIS C SCREENING TEST FOR LOW RISK PATIENT: Primary | ICD-10-CM

## 2022-05-06 DIAGNOSIS — Z78.9 HISTORY OF EXCESSIVE CERUMEN: Chronic | ICD-10-CM

## 2022-05-06 DIAGNOSIS — Z76.89 ENCOUNTER TO ESTABLISH CARE: Primary | ICD-10-CM

## 2022-05-06 DIAGNOSIS — L30.9 ECZEMA, UNSPECIFIED TYPE: Chronic | ICD-10-CM

## 2022-05-06 LAB
EXPIRATION DATE: ABNORMAL
HBA1C MFR BLD: 6.1 %
Lab: ABNORMAL

## 2022-05-06 PROCEDURE — 83036 HEMOGLOBIN GLYCOSYLATED A1C: CPT | Performed by: NURSE PRACTITIONER

## 2022-05-06 PROCEDURE — 99214 OFFICE O/P EST MOD 30 MIN: CPT | Performed by: NURSE PRACTITIONER

## 2022-05-06 RX ORDER — CETIRIZINE HYDROCHLORIDE 10 MG/1
10 TABLET ORAL DAILY
Qty: 30 TABLET | Refills: 12 | Status: SHIPPED | OUTPATIENT
Start: 2022-05-06 | End: 2022-07-07

## 2022-05-06 RX ORDER — DESONIDE 0.5 MG/G
CREAM TOPICAL 2 TIMES DAILY
Qty: 1 EACH | Refills: 6 | Status: SHIPPED | OUTPATIENT
Start: 2022-05-06 | End: 2022-08-31

## 2022-05-06 NOTE — PROGRESS NOTES
"Chief Complaint  Eczema and Ear Fullness    Subjective          Cari Watson presents to [facility]  Eczema  This is a chronic problem. The current episode started more than 1 year ago. The problem occurs constantly. The problem has been waxing and waning. Associated symptoms include a rash. The symptoms are aggravated by stress. The treatment provided significant relief.   Ear Fullness   There is pain in both ears. This is a chronic problem. The current episode started more than 1 year ago. The problem has been waxing and waning. There has been no fever. The patient is experiencing no pain. Associated symptoms include ear discharge and a rash. Treatments tried: ear irrigation  The treatment provided significant relief.   Patient states she just wants her ears checked today to see if they need to be irrigated.      Objective   Vital Signs:  /80 (BP Location: Right arm, Patient Position: Sitting, Cuff Size: Adult)   Pulse 77   Temp 98.4 °F (36.9 °C) (Infrared)   Resp 16   Ht 157.5 cm (62.01\")   Wt (!) 147 kg (325 lb)   SpO2 97%   BMI 59.43 kg/m²     Class 3 Severe Obesity (BMI >=40). Obesity-related health conditions include the following: dyslipidemias. Obesity is improving with lifestyle modifications. BMI is is above average; BMI management plan is completed. We discussed low calorie, low carb based diet program, portion control, increasing exercise and joining a fitness center or start home based exercise program.      Physical Exam  Vitals and nursing note reviewed.   Constitutional:       Appearance: Normal appearance.   HENT:      Head: Normocephalic.      Right Ear: Tympanic membrane normal.      Left Ear: Tympanic membrane normal.      Nose: Nose normal.      Mouth/Throat:      Mouth: Mucous membranes are moist.   Eyes:      Extraocular Movements: Extraocular movements intact.      Pupils: Pupils are equal, round, and reactive to light.   Neck:      Thyroid: No " thyroid mass or thyroid tenderness.   Cardiovascular:      Rate and Rhythm: Normal rate and regular rhythm.      Pulses: Normal pulses.   Pulmonary:      Effort: Pulmonary effort is normal.      Breath sounds: Normal breath sounds.   Abdominal:      General: Bowel sounds are normal.   Musculoskeletal:         General: Normal range of motion.      Cervical back: Normal range of motion.   Lymphadenopathy:      Cervical: No cervical adenopathy.   Skin:     General: Skin is warm and dry.      Findings: Rash (eczema  ) present.   Neurological:      General: No focal deficit present.      Mental Status: She is alert and oriented to person, place, and time.   Psychiatric:         Mood and Affect: Mood normal.         Behavior: Behavior normal.        Result Review :   The following data was reviewed by: JOSE Hazel on 05/06/2022:      Most Recent A1C    HGBA1C Most Recent 5/6/22   Hemoglobin A1C 6.1                     Assessment and Plan    Diagnoses and all orders for this visit:    1. Encounter to establish care (Primary)  Comments:  Follow-up in 4 weeks for annual physical     2. Prediabetes  Comments:  Patient advised to continue with diet modifications (low carb/low sugar diet) and increase physical activity with swimming at pool.  Recheck A1C in 6 months.  Orders:  -     POC Glycosylated Hemoglobin (Hb A1C)  -     Hemoglobin A1c; Future    3. Eczema, unspecified type  -     desonide (DESOWEN) 0.05 % cream; Apply  topically to the appropriate area as directed 2 (Two) Times a Day.  Dispense: 1 each; Refill: 6  -     cetirizine (zyrTEC) 10 MG tablet; Take 1 tablet by mouth Daily.  Dispense: 30 tablet; Refill: 12  -     mupirocin (BACTROBAN) 2 % ointment; Apply 1 application topically to the appropriate area as directed 2 (Two) Times a Day.  Dispense: 22 g; Refill: 1    4. History of excessive cerumen  Comments:  Follow-up prn for worsening ear fullness or cerumen removal.             Follow Up   Return in 4  weeks (on 6/3/2022) for Annual physical.  Patient was given instructions and counseling regarding her condition or for health maintenance advice. Please see specific information pulled into the AVS if appropriate.

## 2022-05-09 NOTE — PROGRESS NOTES
The patient left the office after care was provided and did not complete the visit because she was seen in a separate visit on this date..

## 2022-05-12 ENCOUNTER — PATIENT ROUNDING (BHMG ONLY) (OUTPATIENT)
Dept: FAMILY MEDICINE CLINIC | Facility: CLINIC | Age: 52
End: 2022-05-12

## 2022-05-12 NOTE — PROGRESS NOTES
May 12, 2022    Hello, may I speak with Cari Watson?    My name is Elis     I am  with [facility]  [phone].    Before we get started may I verify your date of birth? 1970    I am calling to officially welcome you to our practice and ask about your recent visit. Is this a good time to talk? yes    Tell me about your visit with us. What things went well?  Clare was wonderful and I am so glad she is here at this office.        We're always looking for ways to make our patients' experiences even better. Do you have recommendations on ways we may improve?  no    Overall were you satisfied with your first visit to our practice? yes       I appreciate you taking the time to speak with me today. Is there anything else I can do for you? no      Thank you, and have a great day.

## 2022-07-01 ENCOUNTER — NURSE TRIAGE (OUTPATIENT)
Dept: CALL CENTER | Facility: HOSPITAL | Age: 52
End: 2022-07-01

## 2022-07-02 NOTE — TELEPHONE ENCOUNTER
Reason for Disposition  • Bad or foul-smelling urine    Additional Information  • Negative: Shock suspected (e.g., cold/pale/clammy skin, too weak to stand, low BP, rapid pulse)  • Negative: Sounds like a life-threatening emergency to the triager  • Negative: Followed a female genital area injury (e.g., vagina, vulva)  • Negative: Followed a male genital area injury (e.g., penis, scrotum)  • Negative: Vaginal discharge  • Negative: Pus (white, yellow) or bloody discharge from end of penis  • Negative: [1] Taking antibiotic for urinary tract infection (UTI) AND [2] female  • Negative: [1] Taking antibiotic for urinary tract infection (UTI) AND [2] male  • Negative: [1] Discomfort (pain, burning or stinging) when passing urine AND [2] pregnant  • Negative: [1] Discomfort (pain, burning or stinging) when passing urine AND [2] postpartum (from 0 to 6 weeks after delivery)  • Negative: [1] Discomfort (pain, burning or stinging) when passing urine AND [2] female  • Negative: [1] Discomfort (pain, burning or stinging) when passing urine AND [2] male  • Negative: Pain or itching in the vulvar area  • Negative: Pain in scrotum is main symptom  • Negative: Blood in the urine is main symptom  • Negative: Symptoms arising from use of a urinary catheter (Puentes or Coude)  • Negative: [1] Unable to urinate (or only a few drops) > 4 hours AND [2] bladder feels very full (e.g., palpable bladder or strong urge to urinate)  • Negative: [1] Decreased urination and [2] drinking very little AND [2] dehydration suspected (e.g., dark urine, no urine > 12 hours, very dry mouth, very lightheaded)  • Negative: Patient sounds very sick or weak to the triager  • Negative: Fever > 100.4 F (38.0 C)  • Negative: Side (flank) or lower back pain present  • Negative: [1] Can't control passage of urine (i.e., urinary incontinence) AND [2] new-onset (< 2 weeks) or worsening  • Negative: Urinating more frequently than usual (i.e., frequency)  •  "Negative: [1] Can't control passage of urine (i.e., urinary incontinence, wetting self) AND [2] present > 2 weeks  • Negative: Urination is difficult to start (i.e., hesitancy) or straining  • Negative: Dribbling (losing urine) just after finishing urination (i.e., post-void dribbling)    Answer Assessment - Initial Assessment Questions  1. SYMPTOM: \"What's the main symptom you're concerned about?\" (e.g., frequency, incontinence)     She states symptoms are like ones with a UTI.    2. ONSET: \"When did the  symptoms  start?\"      Just now.    3. PAIN: \"Is there any pain?\" If Yes, ask: \"How bad is it?\" (Scale: 1-10; mild, moderate, severe)      Moderate    4. CAUSE: \"What do you think is causing the symptoms?\"      UTI    5. OTHER SYMPTOMS: \"Do you have any other symptoms?\" (e.g., fever, flank pain, blood in urine, pain with urination)      No   6. PREGNANCY: \"Is there any chance you are pregnant?\" \"When was your last menstrual period?\"      Unknown    Protocols used: URINARY SYMPTOMS-ADULT-      "

## 2022-07-07 ENCOUNTER — OFFICE VISIT (OUTPATIENT)
Dept: FAMILY MEDICINE CLINIC | Facility: CLINIC | Age: 52
End: 2022-07-07

## 2022-07-07 VITALS
SYSTOLIC BLOOD PRESSURE: 142 MMHG | HEART RATE: 82 BPM | WEIGHT: 293 LBS | OXYGEN SATURATION: 96 % | TEMPERATURE: 98.7 F | HEIGHT: 62 IN | RESPIRATION RATE: 16 BRPM | DIASTOLIC BLOOD PRESSURE: 84 MMHG | BODY MASS INDEX: 53.92 KG/M2

## 2022-07-07 DIAGNOSIS — F41.9 ANXIETY: ICD-10-CM

## 2022-07-07 DIAGNOSIS — L30.9 ECZEMA, UNSPECIFIED TYPE: Primary | ICD-10-CM

## 2022-07-07 PROCEDURE — 96372 THER/PROPH/DIAG INJ SC/IM: CPT | Performed by: NURSE PRACTITIONER

## 2022-07-07 PROCEDURE — 99213 OFFICE O/P EST LOW 20 MIN: CPT | Performed by: NURSE PRACTITIONER

## 2022-07-07 RX ORDER — PREDNISONE 1 MG/1
TABLET ORAL
Qty: 28 TABLET | Refills: 0 | Status: SHIPPED | OUTPATIENT
Start: 2022-07-07 | End: 2022-08-31

## 2022-07-07 RX ORDER — HYDROXYZINE HYDROCHLORIDE 25 MG/1
50 TABLET, FILM COATED ORAL EVERY 6 HOURS PRN
Qty: 120 TABLET | Refills: 2 | Status: CANCELLED | OUTPATIENT
Start: 2022-07-07 | End: 2022-10-05

## 2022-07-07 RX ORDER — TRIAMCINOLONE ACETONIDE 40 MG/ML
40 INJECTION, SUSPENSION INTRA-ARTICULAR; INTRAMUSCULAR ONCE
Status: COMPLETED | OUTPATIENT
Start: 2022-07-07 | End: 2022-07-07

## 2022-07-07 RX ORDER — CLONAZEPAM 1 MG/1
1 TABLET ORAL 2 TIMES DAILY PRN
Qty: 60 TABLET | Refills: 0 | Status: SHIPPED | OUTPATIENT
Start: 2022-07-07 | End: 2022-08-06

## 2022-07-07 RX ADMIN — TRIAMCINOLONE ACETONIDE 40 MG: 40 INJECTION, SUSPENSION INTRA-ARTICULAR; INTRAMUSCULAR at 13:47

## 2022-07-07 NOTE — PROGRESS NOTES
"Chief Complaint  Eczema    Subjective        Cari Watson presents to [facility]  History of Present Illness    Patient states this is the worst her eczema has ever been.  She has new patches on her arms and on the palms of her hands.  She states she has a lot of extra stress right now in her personal life that she knows is contributing to the flare-up.       Objective   Vital Signs:  /84 (BP Location: Left arm, Patient Position: Sitting, Cuff Size: Large Adult)   Pulse 82   Temp 98.7 °F (37.1 °C) (Infrared)   Resp 16   Ht 157.5 cm (62.01\")   Wt (!) 150 kg (330 lb)   SpO2 96%   BMI 60.34 kg/m²   Estimated body mass index is 60.34 kg/m² as calculated from the following:    Height as of this encounter: 157.5 cm (62.01\").    Weight as of this encounter: 150 kg (330 lb).          Physical Exam  Vitals and nursing note reviewed.   Constitutional:       Appearance: Normal appearance.   HENT:      Head: Normocephalic.      Nose: Nose normal.      Mouth/Throat:      Mouth: Mucous membranes are moist.   Eyes:      Extraocular Movements: Extraocular movements intact.      Pupils: Pupils are equal, round, and reactive to light.   Cardiovascular:      Rate and Rhythm: Normal rate and regular rhythm.      Pulses: Normal pulses.   Pulmonary:      Effort: Pulmonary effort is normal.      Breath sounds: Normal breath sounds.   Abdominal:      General: Bowel sounds are normal.   Musculoskeletal:         General: Normal range of motion.      Cervical back: Normal range of motion.   Skin:     General: Skin is warm and dry.      Capillary Refill: Capillary refill takes less than 2 seconds.      Findings: Rash present. Rash is crusting and macular.      Comments: Rash on palms of bilateral hands, bilateral popliteal fossa, and bilateral forearms.   Neurological:      General: No focal deficit present.      Mental Status: She is alert and oriented to person, place, and time.   Psychiatric:  "        Mood and Affect: Mood is anxious. Affect is tearful.         Behavior: Behavior normal.        Result Review :                Assessment and Plan   Diagnoses and all orders for this visit:    1. Eczema, unspecified type (Primary)  -     triamcinolone (KENALOG) 0.1 % ointment; Apply 1 application topically to the appropriate area as directed 2 (Two) Times a Day.  Dispense: 80 g; Refill: 2  -     triamcinolone acetonide (KENALOG-40) injection 40 mg  -     predniSONE (DELTASONE) 5 MG tablet; Take 1 tablet 4 times a day for 3 days.  Then take 1 tablet 3 times a day for 3 days.  Then take 1 tablet 2 times a day for 3 days.  Then take 1 tablet once a day for 3 days.  Dispense: 28 tablet; Refill: 0    2. Anxiety  -     clonazePAM (KlonoPIN) 1 MG tablet; Take 1 tablet by mouth 2 (Two) Times a Day As Needed for Anxiety for up to 30 days.  Dispense: 60 tablet; Refill: 0             Follow Up   Return if symptoms worsen or fail to improve.  Patient was given instructions and counseling regarding her condition or for health maintenance advice. Please see specific information pulled into the AVS if appropriate.

## 2022-07-15 ENCOUNTER — TELEMEDICINE (OUTPATIENT)
Dept: FAMILY MEDICINE CLINIC | Facility: CLINIC | Age: 52
End: 2022-07-15

## 2022-07-15 DIAGNOSIS — U07.1 COVID-19: Primary | ICD-10-CM

## 2022-07-15 PROCEDURE — 99213 OFFICE O/P EST LOW 20 MIN: CPT | Performed by: NURSE PRACTITIONER

## 2022-07-15 RX ORDER — BENZONATATE 100 MG/1
200 CAPSULE ORAL 3 TIMES DAILY PRN
Qty: 21 CAPSULE | Refills: 0 | Status: SHIPPED | OUTPATIENT
Start: 2022-07-15 | End: 2022-07-22

## 2022-07-15 RX ORDER — FLUTICASONE PROPIONATE 50 MCG
2 SPRAY, SUSPENSION (ML) NASAL DAILY
Qty: 11.1 ML | Refills: 0 | Status: SHIPPED | OUTPATIENT
Start: 2022-07-15 | End: 2022-08-12

## 2022-07-15 NOTE — PROGRESS NOTES
"Chief Complaint  Cough    Subjective        Cari Watson presents for a telehealth telephone visit with [facility]  History of Present Illness     Patient verbally consented to telehealth telephone visit.    Cough started last night, occasionally productive.  Headache, fever (101 this morning), body aches started this morning.  Tested positive for COVID this morning with a home test.  Taken 2 Advil and 1 tylenol.  Denies previous COVID infection and is not vaccinated.  She is a current every day smoker, about 0.5 pack/day.  She has not smoked since last night.    Objective   Vital Signs:  There were no vitals taken for this visit.  Estimated body mass index is 60.34 kg/m² as calculated from the following:    Height as of 7/7/22: 157.5 cm (62.01\").    Weight as of 7/7/22: 150 kg (330 lb).          Physical Exam not performed due to telehealth telephone visit.    Result Review :                Assessment and Plan   Diagnoses and all orders for this visit:    1. COVID-19 (Primary)  -     benzonatate (Tessalon Perles) 100 MG capsule; Take 2 capsules by mouth 3 (Three) Times a Day As Needed for Cough for up to 7 days.  Dispense: 21 capsule; Refill: 0  -     fluticasone (Flonase) 50 MCG/ACT nasal spray; 2 sprays into the nostril(s) as directed by provider Daily.  Dispense: 11.1 mL; Refill: 0         - Mucinex DM 2 tablets two times a day prn cough (patient already has at home).       - Ibuprofen 600 mg every 6 hours as needed for headache       - Patient advised to quit smoking to decrease symptom severity and improve overall health.        - Patient advised to go to ER with worsening symptoms or difficulty breathing.           Follow Up   Return if symptoms worsen or fail to improve.  Patient was given instructions and counseling regarding her condition or for health maintenance advice. Please see specific information pulled into the AVS if appropriate.   .    "

## 2022-07-18 ENCOUNTER — OFFICE VISIT (OUTPATIENT)
Dept: FAMILY MEDICINE CLINIC | Facility: CLINIC | Age: 52
End: 2022-07-18

## 2022-07-18 DIAGNOSIS — J40 BRONCHITIS: Primary | ICD-10-CM

## 2022-07-18 DIAGNOSIS — J18.9 PNEUMONIA DUE TO INFECTIOUS ORGANISM, UNSPECIFIED LATERALITY, UNSPECIFIED PART OF LUNG: ICD-10-CM

## 2022-07-18 PROCEDURE — 99213 OFFICE O/P EST LOW 20 MIN: CPT | Performed by: NURSE PRACTITIONER

## 2022-07-18 RX ORDER — AMOXICILLIN AND CLAVULANATE POTASSIUM 562.5; 437.5; 62.5 MG/1; MG/1; MG/1
2 TABLET, FILM COATED, EXTENDED RELEASE ORAL 2 TIMES DAILY
Qty: 28 TABLET | Refills: 0 | Status: SHIPPED | OUTPATIENT
Start: 2022-07-18 | End: 2022-07-19 | Stop reason: RX

## 2022-07-18 RX ORDER — AZITHROMYCIN 250 MG/1
TABLET, FILM COATED ORAL
Qty: 6 TABLET | Refills: 0 | Status: SHIPPED | OUTPATIENT
Start: 2022-07-18 | End: 2022-08-31

## 2022-07-18 NOTE — PROGRESS NOTES
"Chief Complaint  Bronchitis and Cough    Subjective        Cari Watson presents for a telehealth telephone visit with [facility].  Patient was unable to participate in a video telehealth visit as her phone kept loosing signal.    History of Present Illness    Patient has consented to a telehealth telephone visit today.  Patient tested positive for COVID-19 at home last week.  She states she was feeling better until this afternoon.  She has a worsening productive cough with some wheezing and feels tired.  She states she did a lot around the house this morning, and may have overdone it.  Her midback hurts from coughing.  Patient is living with her mother who tested positive for COVID-19 this morning.  Patient has not been infected with COVID before and is unvaccinated against COVID.      Objective   Vital Signs:  There were no vitals taken for this visit.  Estimated body mass index is 60.34 kg/m² as calculated from the following:    Height as of 7/7/22: 157.5 cm (62.01\").    Weight as of 7/7/22: 150 kg (330 lb).          Physical Exam   No vital signs or bmi obtained for this encounter. No physical exam was performed for this telephone telehealth visit.     · Mood appropriate. Communicates easily. Normal judgement and insight. Oriented to time, place and person. Memory appears intact.       Result Review :                Assessment and Plan   Diagnoses and all orders for this visit:    1. Bronchitis (Primary)    2. Pneumonia due to infectious organism, unspecified laterality, unspecified part of lung  -     amoxicillin-clavulanate XR (Augmentin XR) 1000-62.5 MG per 12 hr tablet; Take 2 tablets by mouth 2 (Two) Times a Day for 7 days.  Dispense: 28 tablet; Refill: 0  -     azithromycin (Zithromax Z-Gabriel) 250 MG tablet; Take 2 tablets by mouth on day 1, then 1 tablet daily on days 2-5  Dispense: 6 tablet; Refill: 0    Patient advised to go to the ER with any worsening symptoms or " difficulty breathing.  Patient expressed understanding.          Follow Up   Return if symptoms worsen or fail to improve.  Patient was given instructions and counseling regarding her condition or for health maintenance advice. Please see specific information pulled into the AVS if appropriate.

## 2022-07-19 DIAGNOSIS — J18.9 PNEUMONIA DUE TO INFECTIOUS ORGANISM, UNSPECIFIED LATERALITY, UNSPECIFIED PART OF LUNG: Primary | ICD-10-CM

## 2022-07-19 RX ORDER — CEFPROZIL 250 MG/1
250 TABLET, FILM COATED ORAL 2 TIMES DAILY
Qty: 14 TABLET | Refills: 0 | Status: SHIPPED | OUTPATIENT
Start: 2022-07-19 | End: 2022-07-26

## 2022-08-12 DIAGNOSIS — U07.1 COVID-19: ICD-10-CM

## 2022-08-12 RX ORDER — FLUTICASONE PROPIONATE 50 MCG
SPRAY, SUSPENSION (ML) NASAL
Qty: 16 G | Refills: 11 | Status: SHIPPED | OUTPATIENT
Start: 2022-08-12

## 2022-08-12 NOTE — TELEPHONE ENCOUNTER
Rx Refill Note  Requested Prescriptions     Pending Prescriptions Disp Refills   • fluticasone (FLONASE) 50 MCG/ACT nasal spray [Pharmacy Med Name: FLUTICASONE 50MCG NASAL SP (120) RX] 16 g      Sig: SHAKE LIQUID WELL AND SPRAY TWICE IN EACH NOSTRIL ONCE DAILY      Last office visit with prescribing clinician: 7/18/2022      Next office visit with prescribing clinician: Visit date not found            Yaquelin Kinney RN  08/12/22, 07:32 CDT

## 2022-08-31 ENCOUNTER — OFFICE VISIT (OUTPATIENT)
Dept: FAMILY MEDICINE CLINIC | Facility: CLINIC | Age: 52
End: 2022-08-31

## 2022-08-31 VITALS
TEMPERATURE: 98.2 F | DIASTOLIC BLOOD PRESSURE: 74 MMHG | RESPIRATION RATE: 16 BRPM | WEIGHT: 293 LBS | HEART RATE: 81 BPM | OXYGEN SATURATION: 97 % | HEIGHT: 62 IN | BODY MASS INDEX: 53.92 KG/M2 | SYSTOLIC BLOOD PRESSURE: 108 MMHG

## 2022-08-31 DIAGNOSIS — E66.01 MORBID OBESITY WITH BMI OF 50.0-59.9, ADULT: ICD-10-CM

## 2022-08-31 DIAGNOSIS — L73.9 FOLLICULITIS: ICD-10-CM

## 2022-08-31 DIAGNOSIS — L30.9 ECZEMA, UNSPECIFIED TYPE: Primary | ICD-10-CM

## 2022-08-31 PROCEDURE — 99213 OFFICE O/P EST LOW 20 MIN: CPT | Performed by: NURSE PRACTITIONER

## 2022-08-31 RX ORDER — MUPIROCIN CALCIUM 20 MG/G
1 CREAM TOPICAL 3 TIMES DAILY
Qty: 30 G | Refills: 5 | Status: SHIPPED | OUTPATIENT
Start: 2022-08-31 | End: 2022-10-03 | Stop reason: ALTCHOICE

## 2022-08-31 RX ORDER — FLUOCINONIDE 0.5 MG/G
1 OINTMENT TOPICAL 2 TIMES DAILY
Qty: 60 G | Refills: 1 | Status: SHIPPED | OUTPATIENT
Start: 2022-08-31 | End: 2022-10-20

## 2022-08-31 RX ORDER — PETROLATUM 46.5 G/100G
OINTMENT TOPICAL
Status: CANCELLED | OUTPATIENT
Start: 2022-08-31

## 2022-08-31 RX ORDER — HYDROXYZINE HYDROCHLORIDE 10 MG/1
10 TABLET, FILM COATED ORAL 3 TIMES DAILY PRN
Qty: 270 TABLET | Refills: 0 | Status: SHIPPED | OUTPATIENT
Start: 2022-08-31 | End: 2022-12-01

## 2022-08-31 NOTE — PROGRESS NOTES
"Chief Complaint  Eczema (Bilateral hands, bilateral feet, right elbow) and Abscess (X2 On abdomen and buttocks)    Subjective        Cari Watson presents to [facility]  History of Present Illness    Patient is here with worsening eczema.  It is worse on her hands and is on her feet as well now.  She states her hands and feet callus over and then hurt.  She then peels the callus off or they fall off in the shower.  The tip of her right index finger is raw and she is putting mupirocin on it.  She saw her previous dermatologist in [place] recently.  He prescribed a course of antibiotics for her.  She states she took one day of the antibiotics but does not want to take them anymore because they made her feel awful.  She has done light therapy in the past and it worked well.  She has recently used her mom's Eucerin cream and it seems to be helping.     She has an area of infection on her right buttock by her rectum.  She noticed it yesterday.  It is tender to the touch.  She is worried about it because it is next to an old fissure repair.    Objective   Vital Signs:  /74 (BP Location: Right arm, Patient Position: Sitting, Cuff Size: Adult)   Pulse 81   Temp 98.2 °F (36.8 °C) (Infrared)   Resp 16   Ht 157.5 cm (62\")   Wt (!) 144 kg (317 lb 12.8 oz)   SpO2 97%   BMI 58.13 kg/m²   Estimated body mass index is 58.13 kg/m² as calculated from the following:    Height as of this encounter: 157.5 cm (62\").    Weight as of this encounter: 144 kg (317 lb 12.8 oz).    Class 3 Severe Obesity (BMI >=40). Obesity-related health conditions include the following: hypertension and osteoarthritis. Obesity is unchanged. BMI is is above average; BMI management plan is completed. We discussed low calorie, low carb based diet program, portion control and increasing exercise.  Patient states that her eczema is better controlled when she is on a low carb diet.      Physical Exam  Vitals and " nursing note reviewed.   Constitutional:       Appearance: Normal appearance. She is obese.   HENT:      Head: Normocephalic.      Nose: Nose normal.      Mouth/Throat:      Mouth: Mucous membranes are moist.   Eyes:      Extraocular Movements: Extraocular movements intact.      Pupils: Pupils are equal, round, and reactive to light.   Cardiovascular:      Rate and Rhythm: Normal rate and regular rhythm.      Pulses: Normal pulses.      Heart sounds: Normal heart sounds.   Pulmonary:      Effort: Pulmonary effort is normal.      Breath sounds: Normal breath sounds.   Abdominal:      General: Bowel sounds are normal.   Musculoskeletal:         General: Normal range of motion.      Cervical back: Normal range of motion.   Skin:     General: Skin is warm and dry.      Findings: Rash (Bilateral hands) present.          Neurological:      General: No focal deficit present.      Mental Status: She is alert and oriented to person, place, and time.   Psychiatric:         Mood and Affect: Mood normal.         Behavior: Behavior normal.        Result Review :                Assessment and Plan   Diagnoses and all orders for this visit:    1. Eczema, unspecified type (Primary)  -     mupirocin (Bactroban) 2 % cream; Apply 1 application topically to the appropriate area as directed 3 (Three) Times a Day.  Dispense: 30 g; Refill: 5  -     hydrOXYzine (ATARAX) 10 MG tablet; Take 1 tablet by mouth 3 (Three) Times a Day As Needed for Itching for up to 90 days.  Dispense: 270 tablet; Refill: 0  -     fluocinonide (LIDEX) 0.05 % ointment; Apply 1 application topically to the appropriate area as directed 2 (Two) Times a Day.  Dispense: 60 g; Refill: 1  - Continue to use Eucerin cream at home.  Apply immediately after showering.     2. Morbid obesity with BMI of 50.0-59.9, adult ([facility])  Assessment & Plan:  Patient's (Body mass index is 58.13 kg/m².) indicates that they are morbidly obese (BMI > 40 or > 35 with obesity - related health  condition) with health conditions that include hypertension and osteoarthritis . Weight is unchanged. BMI is is above average; BMI management plan is completed. We discussed low calorie, low carb based diet program, portion control and increasing exercise.     3. Folliculitis       - Apply mupirocin three times a day to right buttock infection until healed.       - Keep area clean and dry.       Follow Up   Return in about 4 weeks (around 9/28/2022) for Annual physical.  Patient was given instructions and counseling regarding her condition or for health maintenance advice. Please see specific information pulled into the AVS if appropriate.

## 2022-08-31 NOTE — ASSESSMENT & PLAN NOTE
Patient's (Body mass index is 58.13 kg/m².) indicates that they are morbidly obese (BMI > 40 or > 35 with obesity - related health condition) with health conditions that include hypertension and osteoarthritis . Weight is unchanged. BMI is is above average; BMI management plan is completed. We discussed low calorie, low carb based diet program, portion control and increasing exercise.

## 2022-09-23 ENCOUNTER — CLINICAL SUPPORT (OUTPATIENT)
Dept: FAMILY MEDICINE CLINIC | Facility: CLINIC | Age: 52
End: 2022-09-23

## 2022-09-23 DIAGNOSIS — R73.03 PREDIABETES: Primary | ICD-10-CM

## 2022-09-23 DIAGNOSIS — Z00.00 ENCOUNTER FOR SCREENING AND PREVENTATIVE CARE: ICD-10-CM

## 2022-09-23 PROCEDURE — 36415 COLL VENOUS BLD VENIPUNCTURE: CPT | Performed by: NURSE PRACTITIONER

## 2022-09-23 NOTE — PROGRESS NOTES
Venipuncture Blood Specimen Collection  Venipuncture performed in [facility] by Bonnie Clemente LPN with good hemostasis. Patient tolerated the procedure well without complications.   09/23/22   Bonnie Clemente LPN

## 2022-09-24 LAB
ALBUMIN SERPL-MCNC: NORMAL G/DL
ALP SERPL-CCNC: NORMAL U/L
ALT SERPL-CCNC: NORMAL U/L
AST SERPL-CCNC: NORMAL U/L
BASOPHILS # BLD AUTO: NORMAL 10*3/UL
BILIRUB SERPL-MCNC: NORMAL MG/DL
BUN SERPL-MCNC: NORMAL MG/DL
CALCIUM SERPL-MCNC: NORMAL MG/DL
CHLORIDE SERPL-SCNC: NORMAL MMOL/L
CO2 SERPL-SCNC: NORMAL MMOL/L
CREAT SERPL-MCNC: NORMAL MG/DL
EOSINOPHIL # BLD AUTO: NORMAL 10*3/UL
EOSINOPHIL NFR BLD AUTO: NORMAL %
GLUCOSE SERPL-MCNC: NORMAL MG/DL
HBA1C MFR BLD: 5.9 % (ref 4.8–5.6)
HCT VFR BLD AUTO: NORMAL %
HGB BLD-MCNC: NORMAL G/DL
LYMPHOCYTES # BLD AUTO: NORMAL 10*3/UL
LYMPHOCYTES NFR BLD AUTO: NORMAL %
MONOCYTES NFR BLD AUTO: NORMAL %
NEUTROPHILS NFR BLD AUTO: NORMAL %
PLATELET # BLD AUTO: NORMAL 10*3/UL
POTASSIUM SERPL-SCNC: NORMAL MMOL/L
PROT SERPL-MCNC: NORMAL G/DL
RBC # BLD AUTO: NORMAL 10*6/UL
REQUEST PROBLEM: NORMAL
SODIUM SERPL-SCNC: NORMAL MMOL/L
SPECIMEN STATUS: NORMAL
WBC # BLD AUTO: NORMAL X10E3/UL

## 2022-09-26 NOTE — PROGRESS NOTES
Spoke with patient for A1C results, voiced understanding. Will come back in this week to redraw the rest.

## 2022-09-28 ENCOUNTER — CLINICAL SUPPORT (OUTPATIENT)
Dept: FAMILY MEDICINE CLINIC | Facility: CLINIC | Age: 52
End: 2022-09-28

## 2022-09-28 DIAGNOSIS — Z00.00 ENCOUNTER FOR SCREENING AND PREVENTATIVE CARE: Primary | ICD-10-CM

## 2022-09-28 PROCEDURE — 36415 COLL VENOUS BLD VENIPUNCTURE: CPT | Performed by: NURSE PRACTITIONER

## 2022-09-28 NOTE — PROGRESS NOTES
Venipuncture Blood Specimen Collection  Venipuncture performed in [facility] by Bonnie Clemente LPN with good hemostasis. Patient tolerated the procedure well without complications.   09/28/22   Bonnie Clemente LPN

## 2022-09-29 LAB
ALBUMIN SERPL-MCNC: 4.3 G/DL (ref 3.8–4.9)
ALBUMIN/GLOB SERPL: 1.5 {RATIO} (ref 1.2–2.2)
ALP SERPL-CCNC: 64 IU/L (ref 44–121)
ALT SERPL-CCNC: 15 IU/L (ref 0–32)
AST SERPL-CCNC: 14 IU/L (ref 0–40)
BASOPHILS # BLD AUTO: 0.1 X10E3/UL (ref 0–0.2)
BASOPHILS NFR BLD AUTO: 1 %
BILIRUB SERPL-MCNC: 0.4 MG/DL (ref 0–1.2)
BUN SERPL-MCNC: 13 MG/DL (ref 6–24)
BUN/CREAT SERPL: 18 (ref 9–23)
CALCIUM SERPL-MCNC: 9.5 MG/DL (ref 8.7–10.2)
CHLORIDE SERPL-SCNC: 99 MMOL/L (ref 96–106)
CO2 SERPL-SCNC: 24 MMOL/L (ref 20–29)
CREAT SERPL-MCNC: 0.73 MG/DL (ref 0.57–1)
EGFRCR SERPLBLD CKD-EPI 2021: 99 ML/MIN/1.73
EOSINOPHIL # BLD AUTO: 0.3 X10E3/UL (ref 0–0.4)
EOSINOPHIL NFR BLD AUTO: 3 %
ERYTHROCYTE [DISTWIDTH] IN BLOOD BY AUTOMATED COUNT: 13 % (ref 11.7–15.4)
GLOBULIN SER CALC-MCNC: 2.8 G/DL (ref 1.5–4.5)
GLUCOSE SERPL-MCNC: 103 MG/DL (ref 70–99)
HCT VFR BLD AUTO: 43 % (ref 34–46.6)
HGB BLD-MCNC: 14.5 G/DL (ref 11.1–15.9)
IMM GRANULOCYTES # BLD AUTO: 0 X10E3/UL (ref 0–0.1)
IMM GRANULOCYTES NFR BLD AUTO: 1 %
LYMPHOCYTES # BLD AUTO: 2.3 X10E3/UL (ref 0.7–3.1)
LYMPHOCYTES NFR BLD AUTO: 27 %
MCH RBC QN AUTO: 31.3 PG (ref 26.6–33)
MCHC RBC AUTO-ENTMCNC: 33.7 G/DL (ref 31.5–35.7)
MCV RBC AUTO: 93 FL (ref 79–97)
MONOCYTES # BLD AUTO: 0.7 X10E3/UL (ref 0.1–0.9)
MONOCYTES NFR BLD AUTO: 8 %
NEUTROPHILS # BLD AUTO: 5.2 X10E3/UL (ref 1.4–7)
NEUTROPHILS NFR BLD AUTO: 60 %
PLATELET # BLD AUTO: 293 X10E3/UL (ref 150–450)
POTASSIUM SERPL-SCNC: 4.4 MMOL/L (ref 3.5–5.2)
PROT SERPL-MCNC: 7.1 G/DL (ref 6–8.5)
RBC # BLD AUTO: 4.64 X10E6/UL (ref 3.77–5.28)
SODIUM SERPL-SCNC: 138 MMOL/L (ref 134–144)
WBC # BLD AUTO: 8.5 X10E3/UL (ref 3.4–10.8)

## 2022-10-03 ENCOUNTER — OFFICE VISIT (OUTPATIENT)
Dept: FAMILY MEDICINE CLINIC | Facility: CLINIC | Age: 52
End: 2022-10-03

## 2022-10-03 VITALS
HEART RATE: 79 BPM | OXYGEN SATURATION: 97 % | BODY MASS INDEX: 53.92 KG/M2 | HEIGHT: 62 IN | WEIGHT: 293 LBS | TEMPERATURE: 98.6 F | SYSTOLIC BLOOD PRESSURE: 123 MMHG | DIASTOLIC BLOOD PRESSURE: 83 MMHG | RESPIRATION RATE: 16 BRPM

## 2022-10-03 DIAGNOSIS — L30.9 ECZEMA, UNSPECIFIED TYPE: Primary | ICD-10-CM

## 2022-10-03 DIAGNOSIS — Z11.59 NEED FOR HEPATITIS C SCREENING TEST: ICD-10-CM

## 2022-10-03 DIAGNOSIS — F41.1 GENERALIZED ANXIETY DISORDER: Chronic | ICD-10-CM

## 2022-10-03 DIAGNOSIS — Z12.11 SCREENING FOR MALIGNANT NEOPLASM OF COLON: ICD-10-CM

## 2022-10-03 DIAGNOSIS — R73.03 PREDIABETES: ICD-10-CM

## 2022-10-03 DIAGNOSIS — E66.01 MORBID OBESITY WITH BMI OF 50.0-59.9, ADULT: Chronic | ICD-10-CM

## 2022-10-03 PROCEDURE — 99214 OFFICE O/P EST MOD 30 MIN: CPT | Performed by: NURSE PRACTITIONER

## 2022-10-03 NOTE — PROGRESS NOTES
"        Subjective     Chief Complaint   Patient presents with   • Annual Exam   • lab results     Wants to discuss labs    • Ear Fullness     Thinks her ears need to be cleaned out    • Med Refill     klonopin       History of Present Illness    Patient is here for her annual physical.      She is wanting a refill on her klonopin.  It helps her during the day but she doesn't take it often.  It also helps her sleep if she takes it at night.  She feels like the atarax is helping with her nerves and her allergies.      Patient states her hands were better for a week.  She bought an narrow band UVB light and has been using it at home but stopped using it 2 weeks ago.  She states the UVB light helped a lot.  She states her hands \"broke out\" last night.  She puts Eucerin on her hands and wears gloves at night, which helps.  Patient states her hands seem better when she was on the low carb diet.  She stopped following the low carb diet about 2 weeks ago because she found it too restrictive.    Patient is fasting today and is wanting labs drawn for her physical.      Patient's PMR from outside medical facility reviewed and noted.    Review of Systems   Constitutional: Positive for fatigue. Negative for unexpected weight change.   Skin: Positive for rash.   Psychiatric/Behavioral: The patient is nervous/anxious.         Otherwise complete ROS reviewed and negative except as mentioned in the HPI.    Past Medical History:   Past Medical History:   Diagnosis Date   • Allergic dermatitis 01/28/2020   • Anal fistula 03/2015    S/P FISTULOTOMY   • Anxiety    • Arthritis    • Back pain    • Cervical adenopathy 04/2017   • Chronic fatigue    • Colon polyps    • Diverticulitis 02/01/2020    ADMITTED TO [facility]   • Diverticulitis 11/17/2019    ADMITTED TO [facility]   • Diverticulitis 06/19/2019    ADMITTED TO [facility]   • Diverticulitis 12/21/2012   • Diverticulitis 04/08/2013   • DUB (dysfunctional uterine bleeding) 07/2015   • Eczema    • " Environmental allergies    • Exposure to hepatitis C    • Fibrocystic breast    • Fluid retention in legs    • Folliculitis    • Hematuria 10/2018   • Hemorrhoids    • Hormone replacement therapy (HRT)     VIVELLE PATCH   • Hypersomnia    • Hypertension    • IBS (irritable bowel syndrome)    • Impacted cerumen    • 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 JOHN JOAQUIN JR   • Leg cramps 09/2013   • JR on CPAP     FOLLOWED BY DR. ALEXX FLETCHER   • Panic attacks    • Perirectal abscess    • Psoriasis    • Rectal bleeding    • Sepsis without acute organ dysfunction (HCC) 02/02/2020   • Wheezing 12/2019   • Yeast infection     CHRONIC     Past Surgical History:  Past Surgical History:   Procedure Laterality Date   • ANAL FISTULOTOMY N/A 03/12/2015    Dr. Jazz Mccartney AT [facility]   • BILATERAL SALPINGO OOPHORECTOMY Bilateral 01/18/2016    WITH HYSTERECTOMY, DR. AMINA CHOUDHURY AT [facility]   • COLONOSCOPY N/A 12/11/2014    SIGMOID DIVERTICULOSIS, 5 MM HYPERPLASTIC POLYP IN RECTUM, RESCOPE IN 5 YRS, RANDOM COLON BX BENIGN, DR. KERON CLARK AT [place]   • COLONOSCOPY W/ POLYPECTOMY N/A 06/23/2010    DIVERTICULOSIS, DIVERTICULITIS IN SIGMOID, SMALL POLYP IN RECTOSIGMOID, DR. JESUS MANUEL BLOOD AT [facility]   • ENDOSCOPY N/A 12/11/2014    ENTIRE EGD WNL, RANDOM BIOPSIES BENIGN, DR. KERON CLARK AT [place]   • HEMORRHOIDECTOMY N/A 03/12/2015    Internal / external hemorrhoidectomy x2 - Dr. Jazz Mccartney AT [facility]   • HYSTERECTOMY N/A 01/18/2016    LAPAROSCOPIC HYSTERECTOMY WITH BSO, DR. AMINA CHOUDHURY AT [facility]   • TUBAL ABDOMINAL LIGATION Bilateral      Social History:  reports that she has been smoking cigarettes. She has a 17.50 pack-year smoking history. She has never used smokeless tobacco. She reports that she does not drink alcohol and does not use drugs.    Family History: family history includes Breast cancer in her maternal grandmother; Cancer in her father and maternal grandmother; Colon polyps in her  "mother; Diabetes in her father and mother; Heart disease in her father; Stroke in her father.      Allergies:  Allergies   Allergen Reactions   • Shrimp (Diagnostic) Swelling   • Soybean-Containing Drug Products Diarrhea   • Sulfa Antibiotics Swelling   • Gluten Meal Rash     Medications:  Prior to Admission medications    Medication Sig Start Date End Date Taking? Authorizing Provider   albuterol (PROVENTIL) (2.5 MG/3ML) 0.083% nebulizer solution AVN Q 4 H PRF WHZ 12/22/19  Yes ProviderKaitlin MD   Brejune Ellipta 200-25 MCG/INH inhaler Inhale 1 puff Daily. 7/8/20  Yes ProviderKaitlin MD   estradiol (VIVELLE-DOT) 0.1 MG/24HR patch PLACE 1 PATCH ON THE SKIN AS DIRECTED BY PROVIDER 2 TIMES A WEEK 1/25/22  Yes Radha Gusman MD   fluocinonide (LIDEX) 0.05 % ointment Apply 1 application topically to the appropriate area as directed 2 (Two) Times a Day. 8/31/22  Yes Clare Nguyen APRN   fluticasone (FLONASE) 50 MCG/ACT nasal spray SHAKE LIQUID WELL AND SPRAY TWICE IN EACH NOSTRIL ONCE DAILY 8/12/22  Yes Clare Nguyen APRN   hydrOXYzine (ATARAX) 10 MG tablet Take 1 tablet by mouth 3 (Three) Times a Day As Needed for Itching for up to 90 days. 8/31/22 11/29/22 Yes Clare Nguyen APRN   mupirocin (Bactroban) 2 % cream Apply 1 application topically to the appropriate area as directed 3 (Three) Times a Day. 8/31/22  Yes Clare Nguyen APRN   Polyethylene Glycol 3350 (MIRALAX PO) Take  by mouth.   Yes ProviderKaitlin MD   clonazePAM (KlonoPIN) 1 MG tablet Take 1 tablet by mouth 2 (Two) Times a Day As Needed for Anxiety for up to 30 days. 7/7/22 8/6/22  Clare Nguyen APRN         Objective     Vital Signs: /83 (BP Location: Right arm, Patient Position: Sitting, Cuff Size: Adult)   Pulse 79   Temp 98.6 °F (37 °C) (Infrared)   Resp 16   Ht 157.5 cm (62\")   Wt (!) 146 kg (321 lb 6.4 oz)   SpO2 97%   Breastfeeding No   BMI 58.78 kg/m² "   Physical Exam  Vitals and nursing note reviewed.   Constitutional:       Appearance: Normal appearance. She is morbidly obese.   HENT:      Head: Normocephalic.      Nose: Nose normal.      Mouth/Throat:      Mouth: Mucous membranes are moist.   Eyes:      Extraocular Movements: Extraocular movements intact.      Pupils: Pupils are equal, round, and reactive to light.   Cardiovascular:      Rate and Rhythm: Normal rate and regular rhythm.      Pulses: Normal pulses.   Pulmonary:      Effort: Pulmonary effort is normal.      Breath sounds: Normal breath sounds.   Musculoskeletal:         General: Normal range of motion.      Cervical back: Normal range of motion.   Skin:     General: Skin is warm and dry.      Comments: Bilateral hands are callused on palms and around fingertips.  There are 2-3 small areas where a callus has come off and healthy pink skin is visible.  No blisters or vesicles noted.  Patient continuously picked at hands and calluses during appointment.     Neurological:      General: No focal deficit present.      Mental Status: She is alert and oriented to person, place, and time.   Psychiatric:         Mood and Affect: Mood is anxious (Patient continuosly picked and hands and calluses during appoinment. ).         Speech: Speech normal.         Behavior: Behavior is cooperative.         Thought Content: Thought content normal.         Class 3 Severe Obesity (BMI >=40). Obesity-related health conditions include the following: diabetes mellitus and dyslipidemias. Obesity is worsening. BMI is is above average; BMI management plan is completed. We discussed low calorie, low carb based diet program, portion control, increasing exercise and joining a fitness center or start home based exercise program.      Advance Care Planning   ACP discussion was held with the patient during this visit.         Results Reviewed:  Glucose   Date Value Ref Range Status   09/28/2022 103 (H) 70 - 99 mg/dL Final      Comment:                   **Please note reference interval change**   02/04/2020 84 65 - 99 mg/dL Final     BUN   Date Value Ref Range Status   09/28/2022 13 6 - 24 mg/dL Final   05/06/2021 9 7 - 20 mg/dL Final     Creatinine   Date Value Ref Range Status   09/28/2022 0.73 0.57 - 1.00 mg/dL Final   05/06/2021 0.6 (L) 0.7 - 1.5 mg/dL Final     Sodium   Date Value Ref Range Status   09/28/2022 138 134 - 144 mmol/L Final   05/06/2021 140 137 - 145 mmol/L Final     Potassium   Date Value Ref Range Status   09/28/2022 4.4 3.5 - 5.2 mmol/L Final   05/06/2021 4.7 3.5 - 5.1 mmol/L Final     Chloride   Date Value Ref Range Status   09/28/2022 99 96 - 106 mmol/L Final   05/06/2021 104 98 - 107 mmol/L Final     Total CO2   Date Value Ref Range Status   09/28/2022 24 20 - 29 mmol/L Final   05/06/2021 27 22 - 30 mmol/L Final     Calcium   Date Value Ref Range Status   09/28/2022 9.5 8.7 - 10.2 mg/dL Final   05/06/2021 8.9 8.4 - 10.2 mg/dL Final     ALT (SGPT)   Date Value Ref Range Status   09/28/2022 15 0 - 32 IU/L Final   05/06/2021 18 13 - 69 U/L Final     AST (SGOT)   Date Value Ref Range Status   09/28/2022 14 0 - 40 IU/L Final   05/06/2021 26 15 - 46 U/L Final     WBC   Date Value Ref Range Status   09/28/2022 8.5 3.4 - 10.8 x10E3/uL Final     Hematocrit   Date Value Ref Range Status   09/28/2022 43.0 34.0 - 46.6 % Final   02/04/2020 32.4 (L) 34.0 - 46.6 % Final     Platelets   Date Value Ref Range Status   09/28/2022 293 150 - 450 x10E3/uL Final   02/04/2020 286 140 - 450 10*3/mm3 Final     Hemoglobin A1C   Date Value Ref Range Status   09/23/2022 5.9 (H) 4.8 - 5.6 % Final     Comment:              Prediabetes: 5.7 - 6.4           Diabetes: >6.4           Glycemic control for adults with diabetes: <7.0     05/06/2022 6.1 % Final   05/06/2021 5.9 (H) 4.3 - 5.6 % Final     Comment:     (note)  A1C% Reference Range:  4.3 - 5.6  Normal range  5.7 - 6.4  Pre-diabetic -increased risk for developing diabetes mellitus.  >=6.5       Diabetic -diagnostic of diabetes mellitus.     Note: For diagnosis of diabetes in individuals without unequivocal   hyperglycemia, results should be confirmed by repeat testing.  Patients with conditions that shorten erythrocyte survival, such as  recovery from acute blood loss, hemolytic anemia, kidney disease,  or the presence of unstable hemoglobins like HbSS, HbCC, and HbSC  may yield falsely decreased HbA1c test results. Iron deficiency may  yield falsely increased HbA1c test results.         Assessment / Plan     Assessment/Plan     Diagnoses and all orders for this visit:    1. Eczema, unspecified type (Primary)  -     mupirocin (BACTROBAN) 2 % ointment; Apply 1 application topically to the appropriate area as directed 2 (Two) Times a Day.  Dispense: 22 g; Refill: 3    2. Generalized anxiety disorder  Assessment & Plan:  Psychological condition is unchanged.  Patient informed that I will prescribe klonopin long-term and thus will not refill her prescription.  Patient encouraged to take hydroxyzine as needed for anxiety.  Risks and benefits of other medication treatment options were discussed but patient does not want to pursue them at this time.   Psychological condition  will be reassessed at the next regular appointment.      3. Morbid obesity with BMI of 50.0-59.9, adult (HCC)  Assessment & Plan:  Patient's (Body mass index is 58.78 kg/m².) indicates that they are morbidly obese (BMI > 40 or > 35 with obesity - related health condition) with health conditions that include diabetes mellitus and dyslipidemias . Weight is worsening. BMI is is above average; BMI management plan is completed. We discussed low calorie, low carb based diet program, portion control, increasing exercise and joining a fitness center or start home based exercise program.       4. Prediabetes  -     Lipid Panel; Future    5. Need for hepatitis C screening test  -     Hepatitis C Antibody; Future    6. Screening for malignant  neoplasm of colon  -     Cologuard - Stool, Per Rectum; Future       An After Visit Summary was printed and given to the patient at discharge.  Return in about 3 months (around 1/3/2023) for Recheck.    I have discussed the patient results/orders and plan/recommendation with them at today's visit.      Clare Nguyen, APRN   10/03/2022

## 2022-10-10 PROBLEM — E66.01 MORBID OBESITY WITH BMI OF 50.0-59.9, ADULT (HCC): Chronic | Status: ACTIVE | Noted: 2019-11-08

## 2022-10-10 NOTE — ASSESSMENT & PLAN NOTE
Psychological condition is unchanged.  Patient informed that I will prescribe klonopin long-term and thus will not refill her prescription.  Patient encouraged to take hydroxyzine as needed for anxiety.  Risks and benefits of other medication treatment options were discussed but patient does not want to pursue them at this time.   Psychological condition  will be reassessed at the next regular appointment.

## 2022-10-10 NOTE — ASSESSMENT & PLAN NOTE
Patient's (Body mass index is 58.78 kg/m².) indicates that they are morbidly obese (BMI > 40 or > 35 with obesity - related health condition) with health conditions that include diabetes mellitus and dyslipidemias . Weight is worsening. BMI is is above average; BMI management plan is completed. We discussed low calorie, low carb based diet program, portion control, increasing exercise and joining a fitness center or start home based exercise program.

## 2022-10-15 DIAGNOSIS — L30.9 ECZEMA, UNSPECIFIED TYPE: ICD-10-CM

## 2022-10-17 NOTE — TELEPHONE ENCOUNTER
Pending Prescriptions:                       Disp   Refills    triamcinolone (KENALOG) 0.1 % ointment [Ph*80 g   2        Sig: APPLY TOPICALLY TO THE AFFECTED AREA TWICE DAILY AS           DIRECTED

## 2022-10-20 DIAGNOSIS — L30.9 ECZEMA, UNSPECIFIED TYPE: ICD-10-CM

## 2022-10-20 RX ORDER — FLUOCINONIDE 0.5 MG/G
OINTMENT TOPICAL
Qty: 60 G | Refills: 1 | Status: SHIPPED | OUTPATIENT
Start: 2022-10-20 | End: 2023-01-17

## 2022-10-20 NOTE — TELEPHONE ENCOUNTER
Pending Prescriptions:                       Disp   Refills    fluocinonide (LIDEX) 0.05 % ointment [Phar*60 g   1        Sig: APPLY TOPICALLY TO THE AFFECTED AREA TWICE DAILY AS           DIRECTED

## 2022-11-04 ENCOUNTER — CLINICAL SUPPORT (OUTPATIENT)
Dept: FAMILY MEDICINE CLINIC | Facility: CLINIC | Age: 52
End: 2022-11-04

## 2022-11-04 DIAGNOSIS — R73.03 PREDIABETES: Primary | ICD-10-CM

## 2022-11-04 DIAGNOSIS — E66.01 MORBID OBESITY WITH BMI OF 50.0-59.9, ADULT: ICD-10-CM

## 2022-11-04 DIAGNOSIS — Z11.59 ENCOUNTER FOR HEPATITIS C SCREENING TEST FOR LOW RISK PATIENT: ICD-10-CM

## 2022-11-05 LAB
CHOLEST SERPL-MCNC: 222 MG/DL (ref 100–199)
HBA1C MFR BLD: 5.8 % (ref 4.8–5.6)
HCV AB S/CO SERPL IA: <0.1 S/CO RATIO (ref 0–0.9)
HDLC SERPL-MCNC: 41 MG/DL
LDLC SERPL CALC-MCNC: 142 MG/DL (ref 0–99)
TRIGL SERPL-MCNC: 215 MG/DL (ref 0–149)
VLDLC SERPL CALC-MCNC: 39 MG/DL (ref 5–40)

## 2022-11-08 NOTE — PROGRESS NOTES
Verify pt  and Full Name, informed pt of lab results and she voiced understanding.  PT politely declined the use of metformin until the end of the 6 months she had asked to be able to control herself.

## 2022-11-30 DIAGNOSIS — L30.9 ECZEMA, UNSPECIFIED TYPE: ICD-10-CM

## 2022-12-01 RX ORDER — HYDROXYZINE HYDROCHLORIDE 10 MG/1
TABLET, FILM COATED ORAL
Qty: 270 TABLET | Refills: 0 | Status: SHIPPED | OUTPATIENT
Start: 2022-12-01

## 2022-12-01 NOTE — TELEPHONE ENCOUNTER
Rx Refill Note  Pending Prescriptions:                       Disp   Refills    hydrOXYzine (ATARAX) 10 MG tablet [Pharmac*270 ta*0        Sig: TAKE 1 TABLET BY MOUTH THREE TIMES DAILY AS NEEDED           FOR ITCHING    Last office visit with prescribing clinician: 10/3/2022   Last telemedicine visit with prescribing clinician: Visit date not found   Next office visit with prescribing clinician: Visit date not found   Orlin Fajardo Rep  12/01/22, 10:19 CST

## 2022-12-02 ENCOUNTER — TELEPHONE (OUTPATIENT)
Dept: FAMILY MEDICINE CLINIC | Facility: CLINIC | Age: 52
End: 2022-12-02

## 2022-12-02 DIAGNOSIS — R73.03 PREDIABETES: Primary | ICD-10-CM

## 2022-12-02 DIAGNOSIS — E66.01 MORBID OBESITY WITH BMI OF 50.0-59.9, ADULT: ICD-10-CM

## 2022-12-02 NOTE — TELEPHONE ENCOUNTER
Caller: Cari Watson    Relationship: Self    Best call back number: [phone]    What medication are you requesting: METFORMIN    Have you had these symptoms before:    [] Yes  [x] No    Have you been treated for these symptoms before:   [] Yes  [x] No    If a prescription is needed, what is your preferred pharmacy and phone number: [id number] - [address] [place] [facility] [address] - [phone] [facility] [phone] FX     Additional notes: PATIENT WAS GIVEN THE OPTION TO BE PUT ON THIS MEDICATION A COUPLE WEEKS AGO. PATIENT IS NOW COMFORTABLE TO GIVE IT A SHOT BUT IS ASKING FOR THE LOWEST POSSIBLE DOSAGE. CALL IF YOU HAVE ANY QUESTIONS FOR HER.

## 2023-01-17 ENCOUNTER — OFFICE VISIT (OUTPATIENT)
Dept: FAMILY MEDICINE CLINIC | Facility: CLINIC | Age: 53
End: 2023-01-17
Payer: MEDICAID

## 2023-01-17 VITALS
SYSTOLIC BLOOD PRESSURE: 115 MMHG | DIASTOLIC BLOOD PRESSURE: 79 MMHG | WEIGHT: 293 LBS | RESPIRATION RATE: 18 BRPM | HEART RATE: 80 BPM | HEIGHT: 62 IN | TEMPERATURE: 98.4 F | BODY MASS INDEX: 53.92 KG/M2 | OXYGEN SATURATION: 98 %

## 2023-01-17 DIAGNOSIS — R73.03 PREDIABETES: Chronic | ICD-10-CM

## 2023-01-17 DIAGNOSIS — R73.03 PREDIABETES: Primary | ICD-10-CM

## 2023-01-17 DIAGNOSIS — N89.8 VAGINAL IRRITATION: ICD-10-CM

## 2023-01-17 DIAGNOSIS — L30.9 ECZEMA, UNSPECIFIED TYPE: Primary | Chronic | ICD-10-CM

## 2023-01-17 DIAGNOSIS — E66.01 MORBID OBESITY WITH BMI OF 50.0-59.9, ADULT: Chronic | ICD-10-CM

## 2023-01-17 DIAGNOSIS — E66.01 MORBID OBESITY WITH BMI OF 50.0-59.9, ADULT: ICD-10-CM

## 2023-01-17 LAB
EXPIRATION DATE: ABNORMAL
HBA1C MFR BLD: 6 %
Lab: ABNORMAL

## 2023-01-17 PROCEDURE — 99214 OFFICE O/P EST MOD 30 MIN: CPT | Performed by: NURSE PRACTITIONER

## 2023-01-17 RX ORDER — PIMECROLIMUS 10 MG/G
1 CREAM TOPICAL 2 TIMES DAILY
Qty: 100 G | Refills: 5 | Status: SHIPPED | OUTPATIENT
Start: 2023-01-17 | End: 2023-01-17

## 2023-01-17 RX ORDER — FLUTICASONE FUROATE AND VILANTEROL 200; 25 UG/1; UG/1
1 POWDER RESPIRATORY (INHALATION) DAILY
Qty: 60 EACH | Refills: 11 | Status: SHIPPED | OUTPATIENT
Start: 2023-01-17

## 2023-01-17 RX ORDER — CALCIPOTRIENE, BETAMETHASONE DIPROPIONATE 50; .643 UG/G; MG/G
1 OINTMENT TOPICAL DAILY
Qty: 100 G | Refills: 5 | Status: SHIPPED | OUTPATIENT
Start: 2023-01-17

## 2023-01-17 RX ORDER — DESONIDE 0.5 MG/G
CREAM TOPICAL 2 TIMES DAILY
COMMUNITY
Start: 2022-11-27 | End: 2023-01-17

## 2023-01-17 NOTE — PROGRESS NOTES
Subjective     Chief Complaint   Patient presents with   • Rash     Eczema. Wants to change up her medicine   • Med Refill     Breo Inhaler        History of Present Illness    Follow up on prediabetes.  Eats low carb diet at times.  She has not lost any weight.     Patient presents today with unimproved eczema.  She is wanting to change her medication.  She continues to have constant peeling of her hands.  She alternates which creams and ointments she uses and on different locations on her body.  She stopped using her UV light until last week and it seemed to help.  She is requesting to try taclonex ointment.  She states she also has an area of eczema and vaginal dryness and irritation that has been there for a while.  She states that she alternates using Ketoralac ointment and vagasil on it.    She needs a refill on her breo inhaler.  Denies shortness of breath at this time.     Patient's PMR from outside medical facility reviewed and noted.    Review of Systems   Respiratory: Negative for cough and shortness of breath.    Endocrine: Negative for polydipsia, polyphagia and polyuria.   Skin: Positive for rash.   Psychiatric/Behavioral: The patient is nervous/anxious.         Otherwise complete ROS reviewed and negative except as mentioned in the HPI.    Past Medical History:   Past Medical History:   Diagnosis Date   • Allergic dermatitis 01/28/2020   • Anal fistula 03/2015    S/P FISTULOTOMY   • Anxiety    • Arthritis    • Back pain    • Cervical adenopathy 04/2017   • Chronic fatigue    • Colon polyps    • Diverticulitis 02/01/2020    ADMITTED TO [facility]   • Diverticulitis 11/17/2019    ADMITTED TO [facility]   • Diverticulitis 06/19/2019    ADMITTED TO [facility]   • Diverticulitis 12/21/2012   • Diverticulitis 04/08/2013   • DUB (dysfunctional uterine bleeding) 07/2015   • Eczema    • Environmental allergies    • Exposure to hepatitis C    • Fibrocystic breast     • Fluid retention in legs    • Folliculitis    • Hematuria 10/2018   • Hemorrhoids    • Hormone replacement therapy (HRT)     VIVELLE PATCH   • Hypersomnia    • Hypertension    • IBS (irritable bowel syndrome)    • Impacted cerumen    • 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 JOHN JOAQUIN JR   • Leg cramps 09/2013   • JR on CPAP     FOLLOWED BY DR. ALEXX FLETCHER   • Panic attacks    • Perirectal abscess    • Psoriasis    • Rectal bleeding    • Sepsis without acute organ dysfunction (HCC) 02/02/2020   • Wheezing 12/2019   • Yeast infection     CHRONIC     Past Surgical History:  Past Surgical History:   Procedure Laterality Date   • ANAL FISTULOTOMY N/A 03/12/2015    Dr. Jazz Mccartney AT [facility]   • BILATERAL SALPINGO OOPHORECTOMY Bilateral 01/18/2016    WITH HYSTERECTOMY, DR. AMINA CHOUDHURY AT [facility]   • COLONOSCOPY N/A 12/11/2014    SIGMOID DIVERTICULOSIS, 5 MM HYPERPLASTIC POLYP IN RECTUM, RESCOPE IN 5 YRS, RANDOM COLON BX BENIGN, DR. KERON CLARK AT [facility]   • COLONOSCOPY W/ POLYPECTOMY N/A 06/23/2010    DIVERTICULOSIS, DIVERTICULITIS IN SIGMOID, SMALL POLYP IN RECTOSIGMOID, DR. JESUS MANUEL BLOOD AT [facility]   • ENDOSCOPY N/A 12/11/2014    ENTIRE EGD WNL, RANDOM BIOPSIES BENIGN, DR. KERON CLARK AT [facility]   • HEMORRHOIDECTOMY N/A 03/12/2015    Internal / external hemorrhoidectomy x2 - Dr. Jazz Mccartney AT [facility]   • HYSTERECTOMY N/A 01/18/2016    LAPAROSCOPIC HYSTERECTOMY WITH BSO, DR. AMINA CHOUDHURY AT [facility]   • TUBAL ABDOMINAL LIGATION Bilateral      Social History:  reports that she has been smoking cigarettes. She has a 17.50 pack-year smoking history. She has never used smokeless tobacco. She reports that she does not drink alcohol and does not use drugs.    Family History: family history includes Breast cancer in her maternal grandmother; Cancer in her father and maternal grandmother; Colon polyps in her mother; Diabetes in her father and mother; Heart disease in her father; Stroke  in her father.      Allergies:  Allergies   Allergen Reactions   • Shrimp (Diagnostic) Swelling   • Soybean-Containing Drug Products Diarrhea   • Sulfa Antibiotics Swelling   • Gluten Meal Rash     Medications:  Prior to Admission medications    Medication Sig Start Date End Date Taking? Authorizing Provider   albuterol (PROVENTIL) (2.5 MG/3ML) 0.083% nebulizer solution AVN Q 4 H PRF WHZ 12/22/19  Yes ProviderKaitlin MD Breo Ellipta 200-25 MCG/INH inhaler Inhale 1 puff Daily. 7/8/20  Yes ProviderKaitlin MD   desonide (DESOWEN) 0.05 % cream Apply  topically to the appropriate area as directed 2 (Two) Times a Day. 11/27/22  Yes ProviderKaitlin MD   estradiol (VIVELLE-DOT) 0.1 MG/24HR patch PLACE 1 PATCH ON THE SKIN AS DIRECTED BY PROVIDER 2 TIMES A WEEK 1/25/22  Yes Radha Gusman MD   fluocinonide (LIDEX) 0.05 % ointment APPLY TOPICALLY TO THE AFFECTED AREA TWICE DAILY AS DIRECTED 10/20/22  Yes Clare Nguyen APRN   fluticasone (FLONASE) 50 MCG/ACT nasal spray SHAKE LIQUID WELL AND SPRAY TWICE IN EACH NOSTRIL ONCE DAILY 8/12/22  Yes Clare Nguyen APRN   hydrOXYzine (ATARAX) 10 MG tablet TAKE 1 TABLET BY MOUTH THREE TIMES DAILY AS NEEDED FOR ITCHING 12/1/22  Yes Clare Nguyen APRN   metFORMIN (Glucophage) 500 MG tablet Take 1 tablet by mouth 2 (Two) Times a Day With Meals for 90 days. 12/2/22 3/2/23 Yes Clare Nguyen APRN   mupirocin (BACTROBAN) 2 % ointment Apply 1 application topically to the appropriate area as directed 2 (Two) Times a Day. 10/3/22  Yes Clare Nguyen APRN   Polyethylene Glycol 3350 (MIRALAX PO) Take  by mouth.   Yes ProviderKaitlin MD   triamcinolone (KENALOG) 0.1 % ointment APPLY TOPICALLY TO THE AFFECTED AREA TWICE DAILY AS DIRECTED 10/17/22  Yes Clare Nguyen APRN   clonazePAM (KlonoPIN) 1 MG tablet Take 1 tablet by mouth 2 (Two) Times a Day As Needed for Anxiety for up to 30 days. 7/7/22 8/6/22   "Clare NguyenJOSE         Objective     Vital Signs: /79 (BP Location: Left arm, Patient Position: Sitting, Cuff Size: Adult)   Pulse 80   Temp 98.4 °F (36.9 °C) (Infrared)   Resp 18   Ht 157.5 cm (62\")   Wt (!) 146 kg (321 lb)   SpO2 98%   BMI 58.71 kg/m²   Physical Exam  Vitals and nursing note reviewed.   Constitutional:       Appearance: Normal appearance. She is morbidly obese.   HENT:      Head: Normocephalic.      Nose: Nose normal.      Mouth/Throat:      Mouth: Mucous membranes are moist.   Eyes:      Extraocular Movements: Extraocular movements intact.      Pupils: Pupils are equal, round, and reactive to light.   Cardiovascular:      Rate and Rhythm: Normal rate and regular rhythm.      Pulses: Normal pulses.      Heart sounds: Normal heart sounds.   Pulmonary:      Effort: Pulmonary effort is normal.      Breath sounds: Normal breath sounds.   Musculoskeletal:         General: Normal range of motion.      Cervical back: Normal range of motion.   Skin:     General: Skin is warm and dry.      Comments: Bilateral hands are callused on palms and around fingertips.  There are several small and moderate sized areas where a callus has come off and healthy pink skin is visible.  No blisters or vesicles noted.    Neurological:      General: No focal deficit present.      Mental Status: She is alert and oriented to person, place, and time.   Psychiatric:         Mood and Affect: Mood normal.         Behavior: Behavior normal.         Thought Content: Thought content normal.         Judgment: Judgment normal.         Class 3 Severe Obesity (BMI >=40). Obesity-related health conditions include the following: obstructive sleep apnea and diabetes mellitus. Obesity is unchanged. BMI is is above average; BMI management plan is completed. We discussed low calorie, low carb based diet program, portion control and increasing exercise.        Results Reviewed:  Glucose   Date Value Ref Range Status "   09/28/2022 103 (H) 70 - 99 mg/dL Final     Comment:                   **Please note reference interval change**   02/04/2020 84 65 - 99 mg/dL Final     BUN   Date Value Ref Range Status   09/28/2022 13 6 - 24 mg/dL Final   05/06/2021 9 7 - 20 mg/dL Final     Creatinine   Date Value Ref Range Status   09/28/2022 0.73 0.57 - 1.00 mg/dL Final   05/06/2021 0.6 (L) 0.7 - 1.5 mg/dL Final     Sodium   Date Value Ref Range Status   09/28/2022 138 134 - 144 mmol/L Final   05/06/2021 140 137 - 145 mmol/L Final     Potassium   Date Value Ref Range Status   09/28/2022 4.4 3.5 - 5.2 mmol/L Final   05/06/2021 4.7 3.5 - 5.1 mmol/L Final     Chloride   Date Value Ref Range Status   09/28/2022 99 96 - 106 mmol/L Final   05/06/2021 104 98 - 107 mmol/L Final     Total CO2   Date Value Ref Range Status   09/28/2022 24 20 - 29 mmol/L Final   05/06/2021 27 22 - 30 mmol/L Final     Calcium   Date Value Ref Range Status   09/28/2022 9.5 8.7 - 10.2 mg/dL Final   05/06/2021 8.9 8.4 - 10.2 mg/dL Final     ALT (SGPT)   Date Value Ref Range Status   09/28/2022 15 0 - 32 IU/L Final   05/06/2021 18 13 - 69 U/L Final     AST (SGOT)   Date Value Ref Range Status   09/28/2022 14 0 - 40 IU/L Final   05/06/2021 26 15 - 46 U/L Final     WBC   Date Value Ref Range Status   09/28/2022 8.5 3.4 - 10.8 x10E3/uL Final     Hematocrit   Date Value Ref Range Status   09/28/2022 43.0 34.0 - 46.6 % Final   02/04/2020 32.4 (L) 34.0 - 46.6 % Final     Platelets   Date Value Ref Range Status   09/28/2022 293 150 - 450 x10E3/uL Final   02/04/2020 286 140 - 450 10*3/mm3 Final     Triglycerides   Date Value Ref Range Status   11/04/2022 215 (H) 0 - 149 mg/dL Final     HDL Cholesterol   Date Value Ref Range Status   11/04/2022 41 >39 mg/dL Final     LDL Chol Calc (NIH)   Date Value Ref Range Status   11/04/2022 142 (H) 0 - 99 mg/dL Final     Hemoglobin A1C   Date Value Ref Range Status   01/17/2023 6.0 % Final   05/06/2021 5.9 (H) 4.3 - 5.6 % Final     Comment:      "(note)  A1C% Reference Range:  4.3 - 5.6  Normal range  5.7 - 6.4  Pre-diabetic -increased risk for developing diabetes mellitus.  >=6.5      Diabetic -diagnostic of diabetes mellitus.     Note: For diagnosis of diabetes in individuals without unequivocal   hyperglycemia, results should be confirmed by repeat testing.  Patients with conditions that shorten erythrocyte survival, such as  recovery from acute blood loss, hemolytic anemia, kidney disease,  or the presence of unstable hemoglobins like HbSS, HbCC, and HbSC  may yield falsely decreased HbA1c test results. Iron deficiency may  yield falsely increased HbA1c test results.         Assessment / Plan     Assessment/Plan     Diagnoses and all orders for this visit:    1. Eczema, unspecified type (Primary)  Assessment & Plan:  Eczema is unchanged.  Prescription sent for taclonex ointment.  Will start PA.  Dermatology referral made as eczema has failed traditional treatments.      Orders:  -     Ambulatory Referral to Dermatology  -     Discontinue: pimecrolimus (Elidel) 1 % cream; Apply 1 application topically to the appropriate area as directed 2 (Two) Times a Day.  Dispense: 100 g; Refill: 5  -     calcipotriene-betamethasone (TACLONEX) 0.005-0.064 % ointment; Apply 1 application topically to the appropriate area as directed Daily.  Dispense: 100 g; Refill: 5    2. Vaginal irritation  -     Cancel: Ambulatory Referral to Gynecology    3. Prediabetes  Assessment & Plan:  A1c is worse.  It was previously 5.8 and it is currently 6.0.  Patient stated that there was \"no way\" the reading was accurate and that \"you guys don't make any sense.\"  She then abruptly left the appointment and did not schedule the requested 3 month follow-up for prediabetes management with A1c recheck.  Will send in prescription for metformin 1000 mg BID and call patient to inform her of prescription change.     Orders:  -     POC Glycosylated Hemoglobin (Hb A1C)    4. Morbid obesity with BMI " of 50.0-59.9, adult ([facility])  Assessment & Plan:  Patient's (Body mass index is 58.71 kg/m².) indicates that they are morbidly/severely obese (BMI > 40 or > 35 with obesity - related health condition) with health conditions that include obstructive sleep apnea and diabetes mellitus . Weight is unchanged. BMI is is above average; BMI management plan is completed. We discussed low calorie, low carb based diet program, portion control and increasing exercise.          An After Visit Summary was printed and given to the patient at discharge.  Return in about 6 months (around 7/17/2023).    I have discussed the patient results/orders and plan/recommendation with them at today's visit.      Clare Nguyen, JOSE   01/17/2023

## 2023-01-17 NOTE — TELEPHONE ENCOUNTER
Caller: Walter Cari R    Relationship: Self    Best call back number: [phone]    Requested Prescriptions:   Requested Prescriptions     Pending Prescriptions Disp Refills   • Fluticasone Furoate-Vilanterol (Breo Ellipta) 200-25 MCG/ACT inhaler       Sig: Inhale 1 puff Daily.        Pharmacy where request should be sent: [id number] - [address] AT [address] [phone] [facility] [phone] [facility]     THE PATIENT STATES THAT HER PHARMACY SAYS SHE NEEDS A NEW SCRIPT    Does the patient have less than a 3 day supply:  [] Yes  [x] No    Would you like a call back once the refill request has been completed: [] Yes [] No    If the office needs to give you a call back, can they leave a voicemail: [] Yes [] No    Orlin Payan Rep   01/17/23 12:14 CST

## 2023-01-17 NOTE — TELEPHONE ENCOUNTER
Pending Prescriptions:                       Disp   Refills    Fluticasone Furoate-Vilanterol (Breo Ellip*                Sig: Inhale 1 puff Daily.

## 2023-01-19 DIAGNOSIS — R73.03 PREDIABETES: ICD-10-CM

## 2023-01-19 DIAGNOSIS — E66.01 MORBID OBESITY WITH BMI OF 50.0-59.9, ADULT: ICD-10-CM

## 2023-01-19 NOTE — TELEPHONE ENCOUNTER
Pending Prescriptions:                       Disp   Refills    metFORMIN (GLUCOPHAGE) 1000 MG tablet [Pha*180 ta*0        Sig: TAKE 1 TABLET BY MOUTH TWICE DAILY WITH MEALS

## 2023-01-21 DIAGNOSIS — Z79.890 HORMONE REPLACEMENT THERAPY (HRT): ICD-10-CM

## 2023-01-21 DIAGNOSIS — Z78.0 MENOPAUSE: ICD-10-CM

## 2023-01-23 NOTE — TELEPHONE ENCOUNTER
Med refill. AE & Mx 5/4/21, canceled 5/10/22. [facility] pharmacy on file. Thank you.

## 2023-01-24 PROBLEM — L30.9 ECZEMA: Chronic | Status: ACTIVE | Noted: 2020-02-02

## 2023-01-24 RX ORDER — ESTRADIOL 0.1 MG/D
FILM, EXTENDED RELEASE TRANSDERMAL
Qty: 24 PATCH | Refills: 3 | OUTPATIENT
Start: 2023-01-24

## 2023-01-24 NOTE — ASSESSMENT & PLAN NOTE
"A1c is worse.  It was previously 5.8 and it is currently 6.0.  Patient stated that there was \"no way\" the reading was accurate and that \"you guys don't make any sense.\"  She then abruptly left the appointment and did not schedule the requested 3 month follow-up for prediabetes management with A1c recheck.  Will send in prescription for metformin 1000 mg BID and call patient to inform her of prescription change.   "

## 2023-01-24 NOTE — ASSESSMENT & PLAN NOTE
Patient's (Body mass index is 58.71 kg/m².) indicates that they are morbidly/severely obese (BMI > 40 or > 35 with obesity - related health condition) with health conditions that include obstructive sleep apnea and diabetes mellitus . Weight is unchanged. BMI is is above average; BMI management plan is completed. We discussed low calorie, low carb based diet program, portion control and increasing exercise.

## 2023-01-24 NOTE — ASSESSMENT & PLAN NOTE
Eczema is unchanged.  Prescription sent for taclonex ointment.  Will start PA.  Dermatology referral made as eczema has failed traditional treatments.

## 2023-01-26 NOTE — TELEPHONE ENCOUNTER
Left message for Cari that Dr Gusman has not seen you for AE & Mx since 5/4/2021. Dr Gusman needs to see you to continue with your HRT. Please call the office at [phone] to schedule your AE & Mx with Dr Gusman. Thank you.

## 2023-02-13 DIAGNOSIS — E66.01 MORBID OBESITY WITH BMI OF 50.0-59.9, ADULT: ICD-10-CM

## 2023-02-13 DIAGNOSIS — R73.03 PREDIABETES: ICD-10-CM

## 2023-02-13 NOTE — TELEPHONE ENCOUNTER
Pending Prescriptions:                       Disp   Refills    metFORMIN (GLUCOPHAGE) 500 MG tablet [Phar*60 tab*2        Sig: TAKE 1 TABLET BY MOUTH TWICE DAILY WITH MEALS

## 2023-04-07 RX ORDER — DESONIDE 0.5 MG/G
1 CREAM TOPICAL 2 TIMES DAILY
COMMUNITY
End: 2023-04-07 | Stop reason: SDUPTHER

## 2023-04-07 RX ORDER — DESONIDE 0.5 MG/G
1 CREAM TOPICAL 2 TIMES DAILY
Qty: 60 G | Refills: 5 | Status: SHIPPED | OUTPATIENT
Start: 2023-04-07

## 2023-05-12 ENCOUNTER — HOSPITAL ENCOUNTER (OUTPATIENT)
Dept: WOMENS IMAGING | Age: 53
Discharge: HOME | End: 2023-05-12
Payer: MEDICAID

## 2023-05-12 DIAGNOSIS — Z12.31 ENCOUNTER FOR SCREENING MAMMOGRAM FOR MALIGNANT NEOPLASM OF BREAST: ICD-10-CM

## 2023-05-12 PROCEDURE — 77067 SCR MAMMO BI INCL CAD: CPT

## 2023-06-08 ENCOUNTER — OFFICE VISIT (OUTPATIENT)
Dept: FAMILY MEDICINE CLINIC | Facility: CLINIC | Age: 53
End: 2023-06-08
Payer: MEDICAID

## 2023-06-08 VITALS
HEIGHT: 62 IN | WEIGHT: 293 LBS | SYSTOLIC BLOOD PRESSURE: 116 MMHG | DIASTOLIC BLOOD PRESSURE: 76 MMHG | HEART RATE: 71 BPM | OXYGEN SATURATION: 97 % | BODY MASS INDEX: 53.92 KG/M2 | RESPIRATION RATE: 18 BRPM | TEMPERATURE: 97.3 F

## 2023-06-08 DIAGNOSIS — E66.01 MORBID OBESITY WITH BMI OF 50.0-59.9, ADULT: Chronic | ICD-10-CM

## 2023-06-08 DIAGNOSIS — R73.03 PREDIABETES: ICD-10-CM

## 2023-06-08 DIAGNOSIS — K43.9 VENTRAL HERNIA WITHOUT OBSTRUCTION OR GANGRENE: Primary | ICD-10-CM

## 2023-06-08 LAB
EXPIRATION DATE: ABNORMAL
HBA1C MFR BLD: 6.1 %
Lab: ABNORMAL

## 2023-06-08 NOTE — PROGRESS NOTES
Subjective     Chief Complaint   Patient presents with   • Hernia     Incisional. Dx in 2018. Is now getting bigger.        History of Present Illness    Diagnosed with ventral hernia in 2018 post laparoscopic abdominal surgery.  Nausea, bloating, burping, and feels like hernia has gotten bigger gradually over time.  She states that when she coughs or leans up, she notices a triangle shaped bulge in the middle of her abdomen.  It does not hurt.  Denies changes to bowel pattern.  She is wanting a referral to Bariatric Surgery with Dr. Vega.  She has an upcoming appointment on July 7 for colonoscopy establishment with GI in [place].      She stopped taking her metformin for the last 1-2 months, maybe longer ago, she is not certain.  She is trying to control her prediabetes with her diet.  She tries to watch her carbs.  She does still drinks sodas.  She states that she does not want to be on metformin forever.  She states that several family members, including her dad are diabetic.        Patient's PMR from outside medical facility reviewed and noted.    Review of Systems   Gastrointestinal:  Positive for abdominal distention and nausea. Negative for blood in stool, constipation and diarrhea.   Endocrine: Negative for polydipsia, polyphagia and polyuria.      Otherwise complete ROS reviewed and negative except as mentioned in the HPI.    Past Medical History:   Past Medical History:   Diagnosis Date   • Allergic dermatitis 01/28/2020   • Anal fistula 03/2015    S/P FISTULOTOMY   • Anxiety    • Arthritis    • Back pain    • Cervical adenopathy 04/2017   • Chronic fatigue    • Colon polyps    • Diverticulitis 02/01/2020    ADMITTED TO [facility]   • Diverticulitis 11/17/2019    ADMITTED TO [facility]   • Diverticulitis 06/19/2019    ADMITTED TO [facility]   • Diverticulitis 12/21/2012   • Diverticulitis 04/08/2013   • DUB (dysfunctional uterine bleeding) 07/2015   • Eczema    • Environmental allergies    • Exposure to hepatitis C     • Fibrocystic breast    • Fluid retention in legs    • Folliculitis    • Hematuria 10/2018   • Hemorrhoids    • Hormone replacement therapy (HRT)     VIVELLE PATCH   • Hypersomnia    • Hypertension    • IBS (irritable bowel syndrome)    • Impacted cerumen    • 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 JOHN JOAQUIN JR   • Leg cramps 09/2013   • JR on CPAP     FOLLOWED BY DR. ALEXX FLETCHER   • Panic attacks    • Perirectal abscess    • Psoriasis    • Rectal bleeding    • Sepsis without acute organ dysfunction 02/02/2020   • Wheezing 12/2019   • Yeast infection     CHRONIC     Past Surgical History:  Past Surgical History:   Procedure Laterality Date   • ANAL FISTULOTOMY N/A 03/12/2015    Dr. Jazz Mccartney AT [facility]   • BILATERAL SALPINGO OOPHORECTOMY Bilateral 01/18/2016    WITH HYSTERECTOMY, DR. AMINA CHOUDHURY AT [facility]   • COLONOSCOPY N/A 12/11/2014    SIGMOID DIVERTICULOSIS, 5 MM HYPERPLASTIC POLYP IN RECTUM, RESCOPE IN 5 YRS, RANDOM COLON BX BENIGN, DR. KERON CLARK AT [place]   • COLONOSCOPY W/ POLYPECTOMY N/A 06/23/2010    DIVERTICULOSIS, DIVERTICULITIS IN SIGMOID, SMALL POLYP IN RECTOSIGMOID, DR. JESUS MANUEL BLOOD AT [facility]   • ENDOSCOPY N/A 12/11/2014    ENTIRE EGD WNL, RANDOM BIOPSIES BENIGN, DR. KERON CLARK AT [place]   • HEMORRHOIDECTOMY N/A 03/12/2015    Internal / external hemorrhoidectomy x2 - Dr. Jazz Mccartney AT [facility]   • HYSTERECTOMY N/A 01/18/2016    LAPAROSCOPIC HYSTERECTOMY WITH BSO, DR. AMINA CHOUDHURY AT [facility]   • TUBAL ABDOMINAL LIGATION Bilateral      Social History:  reports that she has been smoking cigarettes. She has a 17.50 pack-year smoking history. She has never used smokeless tobacco. She reports that she does not drink alcohol and does not use drugs.    Family History: family history includes Breast cancer in her maternal grandmother; Cancer in her father and maternal grandmother; Colon polyps in her mother; Diabetes in her father and mother; Heart disease in  her father; Stroke in her father.      Allergies:  Allergies   Allergen Reactions   • Shrimp (Diagnostic) Swelling   • Soybean-Containing Drug Products Diarrhea   • Sulfa Antibiotics Swelling   • Gluten Meal Rash     Medications:  Prior to Admission medications    Medication Sig Start Date End Date Taking? Authorizing Provider   albuterol (PROVENTIL) (2.5 MG/3ML) 0.083% nebulizer solution AVN Q 4 H PRF WHZ 12/22/19  Yes Kaitlin Carter MD   calcipotriene-betamethasone (TACLONEX) 0.005-0.064 % ointment Apply 1 application topically to the appropriate area as directed Daily. 1/17/23  Yes Clare Nguyen APRN   desonide (DESOWEN) 0.05 % cream Apply 1 application topically to the appropriate area as directed 2 (Two) Times a Day. 4/7/23  Yes Clare Nguyen APRN   estradiol (VIVELLE-DOT) 0.1 MG/24HR patch PLACE 1 PATCH ON THE SKIN AS DIRECTED BY PROVIDER 2 TIMES A WEEK 1/25/22  Yes Radha Gusman MD   fluticasone (FLONASE) 50 MCG/ACT nasal spray SHAKE LIQUID WELL AND SPRAY TWICE IN EACH NOSTRIL ONCE DAILY 8/12/22  Yes Clare Nguyen APRN   Fluticasone Furoate-Vilanterol (Breo Ellipta) 200-25 MCG/ACT inhaler Inhale 1 puff Daily. 1/17/23  Yes Clare Nguyen APRN   hydrOXYzine (ATARAX) 10 MG tablet TAKE 1 TABLET BY MOUTH THREE TIMES DAILY AS NEEDED FOR ITCHING 12/1/22  Yes Clare Nguyen APRN   mupirocin (BACTROBAN) 2 % ointment Apply 1 application topically to the appropriate area as directed 2 (Two) Times a Day. 10/3/22  Yes Clare Nguyen APRN   Polyethylene Glycol 3350 (MIRALAX PO) Take  by mouth.   Yes Kaitlin Carter MD   clonazePAM (KlonoPIN) 1 MG tablet Take 1 tablet by mouth 2 (Two) Times a Day As Needed for Anxiety for up to 30 days. 7/7/22 8/6/22  Clare Nguyen APRN   metFORMIN (GLUCOPHAGE) 1000 MG tablet Take 1 tablet by mouth 2 (Two) Times a Day With Meals for 90 days. 1/17/23 4/17/23  Clare Nguyen APRN  "      PHQ-9 Depression Screening  Little interest or pleasure in doing things? 0-->not at all   Feeling down, depressed, or hopeless? 0-->not at all   Trouble falling or staying asleep, or sleeping too much?     Feeling tired or having little energy?     Poor appetite or overeating?     Feeling bad about yourself - or that you are a failure or have let yourself or your family down?     Trouble concentrating on things, such as reading the newspaper or watching television?     Moving or speaking so slowly that other people could have noticed? Or the opposite - being so fidgety or restless that you have been moving around a lot more than usual?     Thoughts that you would be better off dead, or of hurting yourself in some way?     PHQ-9 Total Score 0   If you checked off any problems, how difficult have these problems made it for you to do your work, take care of things at home, or get along with other people?         PHQ-9 Total Score: 0   0 (Negative screening for depression)      Objective     Vital Signs: /76 (BP Location: Right arm, Patient Position: Sitting, Cuff Size: Adult)   Pulse 71   Temp 97.3 °F (36.3 °C) (Infrared)   Resp 18   Ht 157.5 cm (62\")   Wt (!) 146 kg (321 lb 6.4 oz)   SpO2 97%   BMI 58.78 kg/m²   Physical Exam  Vitals and nursing note reviewed.   Constitutional:       Appearance: She is morbidly obese.   HENT:      Head: Normocephalic.      Nose: Nose normal.      Mouth/Throat:      Mouth: Mucous membranes are moist.   Eyes:      Extraocular Movements: Extraocular movements intact.      Pupils: Pupils are equal, round, and reactive to light.   Cardiovascular:      Rate and Rhythm: Normal rate and regular rhythm.      Pulses: Normal pulses.      Heart sounds: Normal heart sounds.   Pulmonary:      Effort: Pulmonary effort is normal.      Breath sounds: Normal breath sounds.   Abdominal:      General: Bowel sounds are normal.      Palpations: Abdomen is soft.      Tenderness: There is " no abdominal tenderness.      Hernia: A hernia is present. Hernia is present in the ventral area.   Musculoskeletal:         General: Normal range of motion.      Cervical back: Normal range of motion.   Skin:     General: Skin is warm and dry.   Neurological:      General: No focal deficit present.      Mental Status: She is alert and oriented to person, place, and time.   Psychiatric:         Mood and Affect: Mood normal.         Behavior: Behavior normal.         Thought Content: Thought content normal.         Judgment: Judgment normal.         Results Reviewed:  Glucose   Date Value Ref Range Status   09/28/2022 103 (H) 70 - 99 mg/dL Final     Comment:                   **Please note reference interval change**   02/04/2020 84 65 - 99 mg/dL Final     BUN   Date Value Ref Range Status   09/28/2022 13 6 - 24 mg/dL Final   05/06/2021 9 7 - 20 mg/dL Final     Creatinine   Date Value Ref Range Status   09/28/2022 0.73 0.57 - 1.00 mg/dL Final   05/06/2021 0.6 (L) 0.7 - 1.5 mg/dL Final     Sodium   Date Value Ref Range Status   09/28/2022 138 134 - 144 mmol/L Final   05/06/2021 140 137 - 145 mmol/L Final     Potassium   Date Value Ref Range Status   09/28/2022 4.4 3.5 - 5.2 mmol/L Final   05/06/2021 4.7 3.5 - 5.1 mmol/L Final     Chloride   Date Value Ref Range Status   09/28/2022 99 96 - 106 mmol/L Final   05/06/2021 104 98 - 107 mmol/L Final     Total CO2   Date Value Ref Range Status   09/28/2022 24 20 - 29 mmol/L Final   05/06/2021 27 22 - 30 mmol/L Final     Calcium   Date Value Ref Range Status   09/28/2022 9.5 8.7 - 10.2 mg/dL Final   05/06/2021 8.9 8.4 - 10.2 mg/dL Final     ALT (SGPT)   Date Value Ref Range Status   09/28/2022 15 0 - 32 IU/L Final   05/06/2021 18 13 - 69 U/L Final     AST (SGOT)   Date Value Ref Range Status   09/28/2022 14 0 - 40 IU/L Final   05/06/2021 26 15 - 46 U/L Final     WBC   Date Value Ref Range Status   09/28/2022 8.5 3.4 - 10.8 x10E3/uL Final     Hematocrit   Date Value Ref Range  Status   09/28/2022 43.0 34.0 - 46.6 % Final   02/04/2020 32.4 (L) 34.0 - 46.6 % Final     Platelets   Date Value Ref Range Status   09/28/2022 293 150 - 450 x10E3/uL Final   02/04/2020 286 140 - 450 10*3/mm3 Final     Triglycerides   Date Value Ref Range Status   11/04/2022 215 (H) 0 - 149 mg/dL Final     HDL Cholesterol   Date Value Ref Range Status   11/04/2022 41 >39 mg/dL Final     LDL Chol Calc (NIH)   Date Value Ref Range Status   11/04/2022 142 (H) 0 - 99 mg/dL Final     Hemoglobin A1C   Date Value Ref Range Status   06/08/2023 6.1 % Final   05/06/2021 5.9 (H) 4.3 - 5.6 % Final     Comment:     (note)  A1C% Reference Range:  4.3 - 5.6  Normal range  5.7 - 6.4  Pre-diabetic -increased risk for developing diabetes mellitus.  >=6.5      Diabetic -diagnostic of diabetes mellitus.     Note: For diagnosis of diabetes in individuals without unequivocal   hyperglycemia, results should be confirmed by repeat testing.  Patients with conditions that shorten erythrocyte survival, such as  recovery from acute blood loss, hemolytic anemia, kidney disease,  or the presence of unstable hemoglobins like HbSS, HbCC, and HbSC  may yield falsely decreased HbA1c test results. Iron deficiency may  yield falsely increased HbA1c test results.         Assessment / Plan     Assessment/Plan     Diagnoses and all orders for this visit:    1. Ventral hernia without obstruction or gangrene (Primary)  -     Ambulatory Referral to General Surgery    2. Morbid obesity with BMI of 50.0-59.9, adult  Assessment & Plan:  Patient's (Body mass index is 58.78 kg/m².) indicates that they are morbidly/severely obese (BMI > 40 or > 35 with obesity - related health condition) with health conditions that include hypertension, diabetes mellitus, dyslipidemias, and osteoarthritis . Weight is worsening. BMI  is above average; BMI management plan is completed. We discussed low calorie, low carb based diet program, portion control, increasing exercise, and  joining a fitness center or start home based exercise program.       3. Prediabetes  Assessment & Plan:  Prediabetes is worsening.  Previous A1c 5.8, current A1c is 6.  Recommend that patient continue with dietary changes and restart metformin 1000 mg BID.  Patient to continue with dietary changes but declines to restart metformin at this time.  Recheck A1c in 3 months.     Orders:  -     POCT glycated hemoglobin, total         An After Visit Summary was printed and given to the patient at discharge.  Return in about 3 months (around 9/8/2023) for Annual physical.    I have discussed the patient results/orders and plan/recommendation with them at today's visit.      Clare Nguyen, JOSE   06/08/2023

## 2023-06-14 PROBLEM — K43.9 VENTRAL HERNIA WITHOUT OBSTRUCTION OR GANGRENE: Chronic | Status: ACTIVE | Noted: 2023-06-14

## 2023-06-14 PROBLEM — K43.9 VENTRAL HERNIA WITHOUT OBSTRUCTION OR GANGRENE: Status: ACTIVE | Noted: 2023-06-14

## 2023-06-14 NOTE — ASSESSMENT & PLAN NOTE
Prediabetes is worsening.  Previous A1c 5.8, current A1c is 6.  Recommend that patient continue with dietary changes and restart metformin 1000 mg BID.  Patient to continue with dietary changes but declines to restart metformin at this time.  Recheck A1c in 3 months.

## 2023-06-14 NOTE — ASSESSMENT & PLAN NOTE
Patient's (Body mass index is 58.78 kg/m².) indicates that they are morbidly/severely obese (BMI > 40 or > 35 with obesity - related health condition) with health conditions that include hypertension, diabetes mellitus, dyslipidemias, and osteoarthritis . Weight is worsening. BMI  is above average; BMI management plan is completed. We discussed low calorie, low carb based diet program, portion control, increasing exercise, and joining a fitness center or start home based exercise program.

## 2023-10-04 RX ORDER — DESONIDE 0.5 MG/G
CREAM TOPICAL
Qty: 60 G | Refills: 5 | Status: SHIPPED | OUTPATIENT
Start: 2023-10-04

## 2023-10-04 NOTE — TELEPHONE ENCOUNTER
Pending Prescriptions:                       Disp   Refills    desonide (DESOWEN) 0.05 % cream [Pharmacy *60 g   5        Sig: APPLY TOPICALLY TO THE AFFECTED AREA TWICE DAILY AS           DIRECTED

## 2023-10-31 ENCOUNTER — TELEPHONE (OUTPATIENT)
Dept: FAMILY MEDICINE CLINIC | Facility: CLINIC | Age: 53
End: 2023-10-31
Payer: MEDICAID

## 2023-10-31 DIAGNOSIS — N63.20 MASS OF LEFT BREAST, UNSPECIFIED QUADRANT: Primary | ICD-10-CM

## 2023-10-31 NOTE — TELEPHONE ENCOUNTER
Pt called to have US LT breast order placed and faxed to [facility] ASAP as she has an appointment tomorrow morning at 8 AM.    She wants it faxed to [phone] with the ATTN to URGENT Mammo Dept.      PT stated she had an abnormal mammo and did not do the follow up US afterwards and that is what she is getting done tomorrow due to the place on her breast changing as of last night.      Please let me know when order is placed and I will fax

## 2023-11-01 ENCOUNTER — HOSPITAL ENCOUNTER (OUTPATIENT)
Dept: WOMENS IMAGING | Age: 53
Discharge: HOME | End: 2023-11-01
Payer: MEDICAID

## 2023-11-01 DIAGNOSIS — R92.8 ABNORMAL MAMMOGRAM: ICD-10-CM

## 2023-11-01 PROCEDURE — 76642 ULTRASOUND BREAST LIMITED: CPT

## 2023-11-17 ENCOUNTER — OFFICE VISIT (OUTPATIENT)
Dept: FAMILY MEDICINE CLINIC | Facility: CLINIC | Age: 53
End: 2023-11-17
Payer: MEDICAID

## 2023-11-17 VITALS
TEMPERATURE: 97.3 F | SYSTOLIC BLOOD PRESSURE: 117 MMHG | OXYGEN SATURATION: 98 % | DIASTOLIC BLOOD PRESSURE: 78 MMHG | RESPIRATION RATE: 18 BRPM | WEIGHT: 293 LBS | HEIGHT: 62 IN | BODY MASS INDEX: 53.92 KG/M2 | HEART RATE: 83 BPM

## 2023-11-17 DIAGNOSIS — T36.95XA ANTIBIOTIC-INDUCED YEAST INFECTION: ICD-10-CM

## 2023-11-17 DIAGNOSIS — E66.01 MORBID OBESITY WITH BMI OF 50.0-59.9, ADULT: Chronic | ICD-10-CM

## 2023-11-17 DIAGNOSIS — B37.9 ANTIBIOTIC-INDUCED YEAST INFECTION: ICD-10-CM

## 2023-11-17 DIAGNOSIS — N32.89 BLADDER SPASM: ICD-10-CM

## 2023-11-17 DIAGNOSIS — R39.9 UTI SYMPTOMS: Primary | ICD-10-CM

## 2023-11-17 DIAGNOSIS — N30.01 ACUTE CYSTITIS WITH HEMATURIA: ICD-10-CM

## 2023-11-17 LAB
BILIRUB BLD-MCNC: NEGATIVE MG/DL
CLARITY, POC: ABNORMAL
COLOR UR: YELLOW
GLUCOSE UR STRIP-MCNC: NEGATIVE MG/DL
KETONES UR QL: NEGATIVE
LEUKOCYTE EST, POC: ABNORMAL
NITRITE UR-MCNC: NEGATIVE MG/ML
PH UR: 7 [PH] (ref 5–8)
PROT UR STRIP-MCNC: NEGATIVE MG/DL
RBC # UR STRIP: ABNORMAL /UL
SP GR UR: 1.01 (ref 1–1.03)
UROBILINOGEN UR QL: ABNORMAL

## 2023-11-17 RX ORDER — PHENAZOPYRIDINE HYDROCHLORIDE 200 MG/1
200 TABLET, FILM COATED ORAL 3 TIMES DAILY PRN
Qty: 6 TABLET | Refills: 0 | Status: SHIPPED | OUTPATIENT
Start: 2023-11-17 | End: 2023-11-19

## 2023-11-17 RX ORDER — FLUCONAZOLE 150 MG/1
150 TABLET ORAL
Qty: 3 TABLET | Refills: 0 | Status: SHIPPED | OUTPATIENT
Start: 2023-11-17 | End: 2023-11-26

## 2023-11-17 RX ORDER — CIPROFLOXACIN 500 MG/1
500 TABLET, FILM COATED ORAL 2 TIMES DAILY
Qty: 10 TABLET | Refills: 0 | Status: SHIPPED | OUTPATIENT
Start: 2023-11-17 | End: 2023-11-22

## 2023-11-17 NOTE — PROGRESS NOTES
Subjective     Chief Complaint   Patient presents with    uti symptoms     Burning, pressure, pain when urinating         History of Present Illness    Patient presents today with burning with urination, pressure with urination.  Denies flank pain.  Patient states that she is taking amoxicillin 500 mg BID for the last 5 days for a skin infection, she has 2 days left of the antibiotic.  She is taking Azo.      Patient's PMR from outside medical facility reviewed and noted.    Review of Systems   Genitourinary:  Positive for dysuria, frequency and urgency.        Otherwise complete ROS reviewed and negative except as mentioned in the HPI.    Past Medical History:   Past Medical History:   Diagnosis Date    Allergic dermatitis 01/28/2020    Anal fistula 03/2015    S/P FISTULOTOMY    Anxiety     Arthritis     Back pain     Cervical adenopathy 04/2017    Chronic fatigue     Colon polyps     Diverticulitis 02/01/2020    ADMITTED TO [facility]    Diverticulitis 11/17/2019    ADMITTED TO [facility]    Diverticulitis 06/19/2019    ADMITTED TO [facility]    Diverticulitis 12/21/2012    Diverticulitis 04/08/2013    DUB (dysfunctional uterine bleeding) 07/2015    Eczema     Environmental allergies     Exposure to hepatitis C     Fibrocystic breast     Fluid retention in legs     Folliculitis     Hematuria 10/2018    Hemorrhoids     Hormone replacement therapy (HRT)     VIVELLE PATCH    Hypersomnia     Hypertension     IBS (irritable bowel syndrome)     Impacted cerumen     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 JOHN JOAQUIN JR    Leg cramps 09/2013    JR on CPAP     FOLLOWED BY DR. ALEXX FLETCHER    Panic attacks     Perirectal abscess     Psoriasis     Rectal bleeding     Sepsis without acute organ dysfunction 02/02/2020    Wheezing 12/2019    Yeast infection     CHRONIC     Past Surgical History:  Past Surgical History:   Procedure Laterality Date    ANAL FISTULOTOMY N/A 03/12/2015    Dr. Jazz Mccartney AT [facility]     BILATERAL SALPINGO OOPHORECTOMY Bilateral 01/18/2016    WITH HYSTERECTOMY, DR. AMINA CHOUDHURY AT [facility]    COLONOSCOPY N/A 12/11/2014    SIGMOID DIVERTICULOSIS, 5 MM HYPERPLASTIC POLYP IN RECTUM, RESCOPE IN 5 YRS, RANDOM COLON BX BENIGN, DR. KERON CLARK AT [facility]    COLONOSCOPY W/ POLYPECTOMY N/A 06/23/2010    DIVERTICULOSIS, DIVERTICULITIS IN SIGMOID, SMALL POLYP IN RECTOSIGMOID, DR. JESUS MANUEL BLOOD AT [facility]    ENDOSCOPY N/A 12/11/2014    ENTIRE EGD WNL, RANDOM BIOPSIES BENIGN, DR. KERON CLARK AT [facility]    HEMORRHOIDECTOMY N/A 03/12/2015    Internal / external hemorrhoidectomy x2 - Dr. Jazz Mccartney AT [facility]    HYSTERECTOMY N/A 01/18/2016    LAPAROSCOPIC HYSTERECTOMY WITH BSO, DR. AMINA CHOUDHURY AT [facility]    TUBAL ABDOMINAL LIGATION Bilateral      Social History:  reports that she has been smoking cigarettes. She has a 17.50 pack-year smoking history. She has never used smokeless tobacco. She reports that she does not drink alcohol and does not use drugs.    Family History: family history includes Breast cancer in her maternal grandmother; Cancer in her father and maternal grandmother; Colon polyps in her mother; Diabetes in her father and mother; Heart disease in her father; Stroke in her father.      Allergies:  Allergies   Allergen Reactions    Shrimp (Diagnostic) Swelling    Soybean-Containing Drug Products Diarrhea    Sulfa Antibiotics Swelling    Gluten Meal Rash     Medications:  Prior to Admission medications    Medication Sig Start Date End Date Taking? Authorizing Provider   albuterol (PROVENTIL) (2.5 MG/3ML) 0.083% nebulizer solution AVN Q 4 H [facility] 12/22/19  Yes Provider, MD Kaitlin   desonide (DESOWEN) 0.05 % cream APPLY TOPICALLY TO THE AFFECTED AREA TWICE DAILY AS DIRECTED 10/4/23  Yes Clare Nguyen APRN   estradiol (VIVELLE-DOT) 0.1 MG/24HR patch PLACE 1 PATCH ON THE SKIN AS DIRECTED BY PROVIDER 2 TIMES A WEEK 1/25/22  Yes Radha Gusman MD   fluticasone (FLONASE) 50  MCG/ACT nasal spray SHAKE LIQUID WELL AND SPRAY TWICE IN EACH NOSTRIL ONCE DAILY 8/12/22  Yes Clare Nguyen APRN   Fluticasone Furoate-Vilanterol (Breo Ellipta) 200-25 MCG/ACT inhaler Inhale 1 puff Daily. 1/17/23  Yes Clare Nguyen APRN   calcipotriene-betamethasone (TACLONEX) 0.005-0.064 % ointment Apply 1 application topically to the appropriate area as directed Daily.  Patient not taking: Reported on 11/17/2023 1/17/23   Clare Nguyen APRN   clonazePAM (KlonoPIN) 1 MG tablet Take 1 tablet by mouth 2 (Two) Times a Day As Needed for Anxiety for up to 30 days. 7/7/22 8/6/22  Clare Nguyen APRN   hydrOXYzine (ATARAX) 10 MG tablet TAKE 1 TABLET BY MOUTH THREE TIMES DAILY AS NEEDED FOR ITCHING  Patient not taking: Reported on 11/17/2023 12/1/22   Clare Nguyen APRN   metFORMIN (GLUCOPHAGE) 1000 MG tablet Take 1 tablet by mouth 2 (Two) Times a Day With Meals for 90 days. 1/17/23 4/17/23  Clare Nguyen APRN   mupirocin (BACTROBAN) 2 % ointment Apply 1 application topically to the appropriate area as directed 2 (Two) Times a Day.  Patient not taking: Reported on 11/17/2023 10/3/22   Clare Nguyen APRN   Polyethylene Glycol 3350 (MIRALAX PO) Take  by mouth.  Patient not taking: Reported on 11/17/2023    Provider, MD Kaitlin       BRUCE:        PHQ-9 Depression Screening  Little interest or pleasure in doing things? 0-->not at all   Feeling down, depressed, or hopeless? 0-->not at all   Trouble falling or staying asleep, or sleeping too much?     Feeling tired or having little energy?     Poor appetite or overeating?     Feeling bad about yourself - or that you are a failure or have let yourself or your family down?     Trouble concentrating on things, such as reading the newspaper or watching television?     Moving or speaking so slowly that other people could have noticed? Or the opposite - being so fidgety or restless that you have been  "moving around a lot more than usual?     Thoughts that you would be better off dead, or of hurting yourself in some way?     PHQ-9 Total Score 0   If you checked off any problems, how difficult have these problems made it for you to do your work, take care of things at home, or get along with other people?         PHQ-9 Total Score: 0   0 (Negative screening for depression)  Support given, observe for worsening symptoms    Objective     Vital Signs: /78 (BP Location: Left arm, Patient Position: Sitting, Cuff Size: Adult)   Pulse 83   Temp 97.3 °F (36.3 °C) (Infrared)   Resp 18   Ht 157.5 cm (62\")   Wt (!) 146 kg (322 lb)   SpO2 98%   BMI 58.89 kg/m²   Physical Exam  Vitals and nursing note reviewed.   HENT:      Head: Normocephalic.      Nose: Nose normal.      Mouth/Throat:      Mouth: Mucous membranes are moist.   Eyes:      Conjunctiva/sclera: Conjunctivae normal.   Cardiovascular:      Rate and Rhythm: Normal rate and regular rhythm.      Pulses: Normal pulses.      Heart sounds: Normal heart sounds.   Pulmonary:      Effort: Pulmonary effort is normal.      Breath sounds: Normal breath sounds.   Musculoskeletal:         General: Normal range of motion.      Cervical back: Normal range of motion.   Skin:     General: Skin is warm and dry.   Neurological:      General: No focal deficit present.      Mental Status: She is alert and oriented to person, place, and time.   Psychiatric:         Mood and Affect: Mood normal.         Behavior: Behavior normal.         Results Reviewed:  Glucose   Date Value Ref Range Status   09/28/2022 103 (H) 70 - 99 mg/dL Final     Comment:                   **Please note reference interval change**   02/04/2020 84 65 - 99 mg/dL Final     BUN   Date Value Ref Range Status   09/28/2022 13 6 - 24 mg/dL Final   05/06/2021 9 7 - 20 mg/dL Final     Creatinine   Date Value Ref Range Status   09/28/2022 0.73 0.57 - 1.00 mg/dL Final   05/06/2021 0.6 (L) 0.7 - 1.5 mg/dL Final "     Sodium   Date Value Ref Range Status   09/28/2022 138 134 - 144 mmol/L Final   05/06/2021 140 137 - 145 mmol/L Final     Potassium   Date Value Ref Range Status   09/28/2022 4.4 3.5 - 5.2 mmol/L Final   05/06/2021 4.7 3.5 - 5.1 mmol/L Final     Chloride   Date Value Ref Range Status   09/28/2022 99 96 - 106 mmol/L Final   05/06/2021 104 98 - 107 mmol/L Final     Total CO2   Date Value Ref Range Status   09/28/2022 24 20 - 29 mmol/L Final   05/06/2021 27 22 - 30 mmol/L Final     Calcium   Date Value Ref Range Status   09/28/2022 9.5 8.7 - 10.2 mg/dL Final   05/06/2021 8.9 8.4 - 10.2 mg/dL Final     ALT (SGPT)   Date Value Ref Range Status   09/28/2022 15 0 - 32 IU/L Final   05/06/2021 18 13 - 69 U/L Final     AST (SGOT)   Date Value Ref Range Status   09/28/2022 14 0 - 40 IU/L Final   05/06/2021 26 15 - 46 U/L Final     WBC   Date Value Ref Range Status   09/28/2022 8.5 3.4 - 10.8 x10E3/uL Final   01/11/2020 9.1 4.5 - 11.0 10*3/uL Final     Hematocrit   Date Value Ref Range Status   09/28/2022 43.0 34.0 - 46.6 % Final   02/04/2020 32.4 (L) 34.0 - 46.6 % Final   01/11/2020 41.1 36.0 - 46.0 % Final     Platelets   Date Value Ref Range Status   09/28/2022 293 150 - 450 x10E3/uL Final   02/04/2020 286 140 - 450 10*3/mm3 Final   01/11/2020 253 140 - 440 10*3/uL Final     Triglycerides   Date Value Ref Range Status   11/04/2022 215 (H) 0 - 149 mg/dL Final     HDL Cholesterol   Date Value Ref Range Status   11/04/2022 41 >39 mg/dL Final     LDL Chol Calc (NIH)   Date Value Ref Range Status   11/04/2022 142 (H) 0 - 99 mg/dL Final     Hemoglobin A1C   Date Value Ref Range Status   06/08/2023 6.1 % Final   05/06/2021 5.9 (H) 4.3 - 5.6 % Final     Comment:     (note)  A1C% Reference Range:  4.3 - 5.6  Normal range  5.7 - 6.4  Pre-diabetic -increased risk for developing diabetes mellitus.  >=6.5      Diabetic -diagnostic of diabetes mellitus.     Note: For diagnosis of diabetes in individuals without unequivocal    hyperglycemia, results should be confirmed by repeat testing.  Patients with conditions that shorten erythrocyte survival, such as  recovery from acute blood loss, hemolytic anemia, kidney disease,  or the presence of unstable hemoglobins like HbSS, HbCC, and HbSC  may yield falsely decreased HbA1c test results. Iron deficiency may  yield falsely increased HbA1c test results.     POCT urinalysis dipstick, multipro (11/17/2023 10:42)     Assessment / Plan     Assessment/Plan     Diagnoses and all orders for this visit:    1. UTI symptoms (Primary)  -     POCT urinalysis dipstick, multipro    2. Bladder spasm  -     phenazopyridine (Pyridium) 200 MG tablet; Take 1 tablet by mouth 3 (Three) Times a Day As Needed for Bladder Spasms for up to 2 days.  Dispense: 6 tablet; Refill: 0    3. Acute cystitis with hematuria  -     ciprofloxacin (Cipro) 500 MG tablet; Take 1 tablet by mouth 2 (Two) Times a Day for 5 days.  Dispense: 10 tablet; Refill: 0  -     Urine Culture - Urine, Urine, Clean Catch    4. Antibiotic-induced yeast infection  -     fluconazole (Diflucan) 150 MG tablet; Take 1 tablet by mouth Every 3 (Three) Days for 9 days.  Dispense: 3 tablet; Refill: 0    5. Morbid obesity with BMI of 50.0-59.9, adult         An After Visit Summary was printed and given to the patient at discharge.  Return in about 4 weeks (around 12/15/2023) for Annual physical.    I have discussed the patient results/orders and plan/recommendation with them at today's visit.      JOSE Hazel   11/17/2023

## 2023-11-22 LAB
BACTERIA UR CULT: ABNORMAL
BACTERIA UR CULT: ABNORMAL
OTHER ANTIBIOTIC SUSC ISLT: ABNORMAL

## 2024-01-17 RX ORDER — FLUTICASONE FUROATE AND VILANTEROL 200; 25 UG/1; UG/1
1 POWDER RESPIRATORY (INHALATION) DAILY
Qty: 60 EACH | Refills: 11 | Status: SHIPPED | OUTPATIENT
Start: 2024-01-17

## 2024-01-17 NOTE — TELEPHONE ENCOUNTER
Pending Prescriptions:                       Disp   Refills    Fluticasone Furoate-Vilanterol (BREO ELLIP*60 each11       Sig: INHALE 1 PUFF BY MOUTH DAILY

## 2024-05-01 ENCOUNTER — TELEPHONE (OUTPATIENT)
Dept: FAMILY MEDICINE CLINIC | Facility: CLINIC | Age: 54
End: 2024-05-01

## 2024-05-01 ENCOUNTER — TELEMEDICINE (OUTPATIENT)
Dept: FAMILY MEDICINE CLINIC | Facility: CLINIC | Age: 54
End: 2024-05-01
Payer: COMMERCIAL

## 2024-05-01 DIAGNOSIS — J01.40 ACUTE NON-RECURRENT PANSINUSITIS: Primary | ICD-10-CM

## 2024-05-01 PROCEDURE — 99213 OFFICE O/P EST LOW 20 MIN: CPT | Performed by: NURSE PRACTITIONER

## 2024-05-01 RX ORDER — AMOXICILLIN AND CLAVULANATE POTASSIUM 875; 125 MG/1; MG/1
1 TABLET, FILM COATED ORAL 2 TIMES DAILY
Qty: 14 TABLET | Refills: 0 | Status: SHIPPED | OUTPATIENT
Start: 2024-05-01 | End: 2024-05-08

## 2024-05-01 RX ORDER — METHYLPREDNISOLONE 4 MG/1
TABLET ORAL
Qty: 21 TABLET | Refills: 0 | Status: SHIPPED | OUTPATIENT
Start: 2024-05-01

## 2024-05-01 NOTE — TELEPHONE ENCOUNTER
Caller: Cari Watson    Relationship: Self    Best call back number: [phone]     What medication are you requesting:     What are your current symptoms: HEAD COLD, FEVER, DIFFICULT WITH BREATHING, BODY ACHES    How long have you been experiencing symptoms: 4 DAYS NOW    Have you had these symptoms before:    [x] Yes  [] No    Have you been treated for these symptoms before:   [x] Yes  [] No    If a prescription is needed, what is your preferred pharmacy and phone number: [facility] #[id number] [address] AT [facility] [address] - [phone] [facility] [phone]      Additional notes: NEGATIVE FOR COVID

## 2024-05-01 NOTE — PROGRESS NOTES
Patient presents for video appointment.   You have chosen to receive care through a telehealth visit.  Do you consent to use a video/audio connection for your medical care today? Yes     Patient location: [address] [place] [address] [place] [address]  [place] [facility] [place]    CC: uri    History:  Cari Watson is a 53 y.o. female   Negative covid home test last night.  Felt like she had a fever last night, but her temperature is normal.  Body aches, headache, sore throat started on Saturday.  Sore throat and headache have resolved.  She still has body aches, productive cough, fatigue, nasal congestion, and sinus pressure.  Denies shortness of breath and wheezing.  Took Nyquil today with some relief.         ROS:  Review of Systems     reports that she has been smoking cigarettes. She has a 17.5 pack-year smoking history. She has never used smokeless tobacco. She reports that she does not drink alcohol and does not use drugs.      Current Outpatient Medications:     albuterol (PROVENTIL) (2.5 MG/3ML) 0.083% nebulizer solution, AVN Q 4 H PRF [facility], Disp: , Rfl:     amoxicillin-clavulanate (AUGMENTIN) 875-125 MG per tablet, Take 1 tablet by mouth 2 (Two) Times a Day for 7 days., Disp: 14 tablet, Rfl: 0    calcipotriene-betamethasone (TACLONEX) 0.005-0.064 % ointment, Apply 1 application topically to the appropriate area as directed Daily. (Patient not taking: Reported on 11/17/2023), Disp: 100 g, Rfl: 5    clonazePAM (KlonoPIN) 1 MG tablet, Take 1 tablet by mouth 2 (Two) Times a Day As Needed for Anxiety for up to 30 days., Disp: 60 tablet, Rfl: 0    desonide (DESOWEN) 0.05 % cream, APPLY TOPICALLY TO THE AFFECTED AREA TWICE DAILY AS DIRECTED, Disp: 60 g, Rfl: 5    estradiol (VIVELLE-DOT) 0.1 MG/24HR patch, PLACE 1 PATCH ON THE SKIN AS DIRECTED BY PROVIDER 2 TIMES A WEEK, Disp: 24 patch, Rfl: 3    fluticasone (FLONASE) 50 MCG/ACT nasal spray, SHAKE LIQUID WELL AND SPRAY TWICE IN EACH NOSTRIL ONCE DAILY, Disp: 16 g, Rfl:  11    Fluticasone Furoate-Vilanterol (BREO ELLIPTA) 200-25 MCG/ACT inhaler, INHALE 1 PUFF BY MOUTH DAILY, Disp: 60 each, Rfl: 11    hydrOXYzine (ATARAX) 10 MG tablet, TAKE 1 TABLET BY MOUTH THREE TIMES DAILY AS NEEDED FOR ITCHING (Patient not taking: Reported on 11/17/2023), Disp: 270 tablet, Rfl: 0    metFORMIN (GLUCOPHAGE) 1000 MG tablet, Take 1 tablet by mouth 2 (Two) Times a Day With Meals for 90 days., Disp: 180 tablet, Rfl: 0    methylPREDNISolone (MEDROL) 4 MG dose pack, Take as directed on package instructions., Disp: 21 tablet, Rfl: 0    mupirocin (BACTROBAN) 2 % ointment, Apply 1 application topically to the appropriate area as directed 2 (Two) Times a Day. (Patient not taking: Reported on 11/17/2023), Disp: 22 g, Rfl: 3    Polyethylene Glycol 3350 (MIRALAX PO), Take  by mouth. (Patient not taking: Reported on 11/17/2023), Disp: , Rfl:     OBJECTIVE:    No vital signs or bmi obtained for this encounter.      Physical exam-  General appearance- Alert. Dressed appropriately. Ill appearing.   HEENT- external examination of ears normal. Head is atraumatic. Hearing normal.  Nasal congestion and trace peripheral edema noted.   Neck is symmetric, trachea midline.   Normal respiratory effort.   Digits and nails appear healthy. No clubbing, rashes or discolorations.   Normal range of motion of all extremities.  Mood appropriate. Communicates easily. Normal judgement and insight. Oriented to time, place and person. Memory appears intact.    Assessment/Plan     Diagnoses and all orders for this visit:    1. Acute non-recurrent pansinusitis (Primary)  -     methylPREDNISolone (MEDROL) 4 MG dose pack; Take as directed on package instructions.  Dispense: 21 tablet; Refill: 0  -     amoxicillin-clavulanate (AUGMENTIN) 875-125 MG per tablet; Take 1 tablet by mouth 2 (Two) Times a Day for 7 days.  Dispense: 14 tablet; Refill: 0        An After Visit Summary was printed and given to the patient at discharge.  Return if  symptoms worsen or fail to improve.          Clare Nguyen, APRN   05/01/2024

## 2024-07-29 ENCOUNTER — TELEPHONE (OUTPATIENT)
Dept: FAMILY MEDICINE CLINIC | Facility: CLINIC | Age: 54
End: 2024-07-29

## 2024-07-29 DIAGNOSIS — J01.40 ACUTE NON-RECURRENT PANSINUSITIS: ICD-10-CM

## 2024-07-29 RX ORDER — METHYLPREDNISOLONE 4 MG
TABLET, DOSE PACK ORAL
Qty: 21 TABLET | Refills: 0 | Status: SHIPPED | OUTPATIENT
Start: 2024-07-29

## 2024-07-29 NOTE — TELEPHONE ENCOUNTER
Called patient to inform her that JOSE Guerrero sent meds in to her pharmacy as requested due to positive covid test. Patient voiced understanding.

## 2024-07-29 NOTE — TELEPHONE ENCOUNTER
Caller: Cari Watson    Relationship: Self    Best call back number:  [phone]    What medication are you requesting:     PCP RECOMMENDATION    ANTIBIOTIC AND STEROID FROM LAST TIME PATIENT HAD COVID - TESTED TWICE TODAY    Have you had these symptoms before:    [x] Yes  [] No    Have you been treated for these symptoms before:   [x] Yes  [] No    If a prescription is needed, what is your preferred pharmacy and phone number:      [facility] #[id number] AT [address] - [phone] [facility] [phone] [facility]     Additional notes:    PLEASE CALL PATIENT

## 2024-09-12 DIAGNOSIS — L30.9 ECZEMA, UNSPECIFIED TYPE: ICD-10-CM

## 2024-09-12 RX ORDER — HYDROXYZINE HYDROCHLORIDE 10 MG/1
10 TABLET, FILM COATED ORAL 3 TIMES DAILY PRN
Qty: 270 TABLET | Refills: 0 | OUTPATIENT
Start: 2024-09-12

## 2025-02-03 ENCOUNTER — OFFICE VISIT (OUTPATIENT)
Dept: FAMILY MEDICINE CLINIC | Facility: CLINIC | Age: 55
End: 2025-02-03
Payer: COMMERCIAL

## 2025-02-03 VITALS
SYSTOLIC BLOOD PRESSURE: 122 MMHG | DIASTOLIC BLOOD PRESSURE: 76 MMHG | RESPIRATION RATE: 16 BRPM | HEIGHT: 62 IN | WEIGHT: 293 LBS | HEART RATE: 75 BPM | TEMPERATURE: 98 F | BODY MASS INDEX: 53.92 KG/M2

## 2025-02-03 DIAGNOSIS — M79.672 ACUTE PAIN OF LEFT FOOT: ICD-10-CM

## 2025-02-03 DIAGNOSIS — L20.84 INTRINSIC ECZEMA: Primary | Chronic | ICD-10-CM

## 2025-02-03 DIAGNOSIS — F41.1 GENERALIZED ANXIETY DISORDER: ICD-10-CM

## 2025-02-03 DIAGNOSIS — E66.01 MORBID OBESITY WITH BMI OF 50.0-59.9, ADULT: Chronic | ICD-10-CM

## 2025-02-03 DIAGNOSIS — L30.9 ECZEMA, UNSPECIFIED TYPE: ICD-10-CM

## 2025-02-03 DIAGNOSIS — M79.671 ACUTE FOOT PAIN, RIGHT: ICD-10-CM

## 2025-02-03 PROCEDURE — 99214 OFFICE O/P EST MOD 30 MIN: CPT | Performed by: NURSE PRACTITIONER

## 2025-02-03 PROCEDURE — 96372 THER/PROPH/DIAG INJ SC/IM: CPT | Performed by: NURSE PRACTITIONER

## 2025-02-03 RX ORDER — SILVER SULFADIAZINE 10 MG/G
1 CREAM TOPICAL 2 TIMES DAILY
Qty: 400 G | Refills: 0 | Status: SHIPPED | OUTPATIENT
Start: 2025-02-03

## 2025-02-03 RX ORDER — DULOXETIN HYDROCHLORIDE 30 MG/1
30 CAPSULE, DELAYED RELEASE ORAL DAILY
Qty: 30 CAPSULE | Refills: 2 | Status: SHIPPED | OUTPATIENT
Start: 2025-02-03

## 2025-02-03 RX ORDER — KETOROLAC TROMETHAMINE 30 MG/ML
30 INJECTION, SOLUTION INTRAMUSCULAR; INTRAVENOUS ONCE
Status: COMPLETED | OUTPATIENT
Start: 2025-02-03 | End: 2025-02-03

## 2025-02-03 RX ORDER — HYDROXYZINE HYDROCHLORIDE 10 MG/1
10 TABLET, FILM COATED ORAL 3 TIMES DAILY PRN
Qty: 270 TABLET | Refills: 0 | Status: SHIPPED | OUTPATIENT
Start: 2025-02-03

## 2025-02-03 RX ORDER — MUPIROCIN 20 MG/G
1 OINTMENT TOPICAL 2 TIMES DAILY
Qty: 90 G | Refills: 1 | Status: SHIPPED | OUTPATIENT
Start: 2025-02-03

## 2025-02-03 RX ORDER — TRIAMCINOLONE ACETONIDE 40 MG/ML
40 INJECTION, SUSPENSION INTRA-ARTICULAR; INTRAMUSCULAR ONCE
Status: COMPLETED | OUTPATIENT
Start: 2025-02-03 | End: 2025-02-03

## 2025-02-03 RX ORDER — OXYCODONE AND ACETAMINOPHEN 5; 325 MG/1; MG/1
1 TABLET ORAL EVERY 4 HOURS PRN
Qty: 18 TABLET | Refills: 0 | Status: SHIPPED | OUTPATIENT
Start: 2025-02-03

## 2025-02-03 RX ORDER — DEXAMETHASONE SODIUM PHOSPHATE 10 MG/ML
10 INJECTION INTRAMUSCULAR; INTRAVENOUS ONCE
Status: DISCONTINUED | OUTPATIENT
Start: 2025-02-03 | End: 2025-02-03

## 2025-02-03 RX ADMIN — KETOROLAC TROMETHAMINE 30 MG: 30 INJECTION, SOLUTION INTRAMUSCULAR; INTRAVENOUS at 11:21

## 2025-02-03 RX ADMIN — TRIAMCINOLONE ACETONIDE 40 MG: 40 INJECTION, SUSPENSION INTRA-ARTICULAR; INTRAMUSCULAR at 11:29

## 2025-02-03 NOTE — PROGRESS NOTES
Subjective     Chief Complaint   Patient presents with    Eczema     Severe pain caused by eczema all over feet and hands, and now on face       History of Present Illness    Patient presents today with severe eczema exacerbation on her hands and feet.  Her skin is peeling and it is painful and itching, hurts to walk and do anything with her hands.  She has tried and failed: soaking her feet, triamcinolone ointment, she has ordered varun boots with zinc, isaias, vaseline.  She states that she has had a lot of stress and changes in her personal life over the last several months which she feels has made her symptoms worse.  She quit smoking on 11/11/24, she had hernia surgery, and then her  had a stroke.  She is taking hydroxyzine for the itching and it is helping minimally.  She has an appointment with [facility] in [place] at the beginning of March.  Taking advil 4 day with minimal relief for pain.      Patient's PMR from outside medical facility reviewed and noted.    Review of Systems     Otherwise complete ROS reviewed and negative except as mentioned in the HPI.    Past Medical History:   Past Medical History:   Diagnosis Date    Allergic dermatitis 01/28/2020    Anal fistula 03/2015    S/P FISTULOTOMY    Anxiety     Arthritis     Back pain     Cervical adenopathy 04/2017    Chronic fatigue     Colon polyps     Diverticulitis 02/01/2020    ADMITTED TO [facility]    Diverticulitis 11/17/2019    ADMITTED TO [facility]    Diverticulitis 06/19/2019    ADMITTED TO [facility]    Diverticulitis 12/21/2012    Diverticulitis 04/08/2013    DUB (dysfunctional uterine bleeding) 07/2015    Eczema     Environmental allergies     Exposure to hepatitis C     Fibrocystic breast     Fluid retention in legs     Folliculitis     Hematuria 10/2018    Hemorrhoids     Hormone replacement therapy (HRT)     VIVELLE PATCH    Hypersomnia     Hypertension     IBS (irritable bowel syndrome)     Impacted cerumen     Incarcerated ventral  hernia 02/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 JOHN JOAQUIN JR    Leg cramps 09/2013    JR on CPAP     FOLLOWED BY DR. ALEXX FLETCHER    Panic attacks     Perirectal abscess     Psoriasis     Rectal bleeding     Sepsis without acute organ dysfunction 02/02/2020    Wheezing 12/2019    Yeast infection     CHRONIC     Past Surgical History:  Past Surgical History:   Procedure Laterality Date    ANAL FISTULOTOMY N/A 03/12/2015    Dr. Jazz Mccartney AT [facility]    BILATERAL SALPINGO OOPHORECTOMY Bilateral 01/18/2016    WITH HYSTERECTOMY, DR. AMINA CHOUDHURY AT [facility]    COLONOSCOPY N/A 12/11/2014    SIGMOID DIVERTICULOSIS, 5 MM HYPERPLASTIC POLYP IN RECTUM, RESCOPE IN 5 YRS, RANDOM COLON BX BENIGN, DR. KERON CLARK AT [place]    COLONOSCOPY W/ POLYPECTOMY N/A 06/23/2010    DIVERTICULOSIS, DIVERTICULITIS IN SIGMOID, SMALL POLYP IN RECTOSIGMOID, DR. JESUS MANUEL BLOOD AT [facility]    ENDOSCOPY N/A 12/11/2014    ENTIRE EGD WNL, RANDOM BIOPSIES BENIGN, DR. KERON CLARK AT [place]    HEMORRHOIDECTOMY N/A 03/12/2015    Internal / external hemorrhoidectomy x2 - Dr. Jazz Mccartney AT [facility]    HYSTERECTOMY N/A 01/18/2016    LAPAROSCOPIC HYSTERECTOMY WITH BSO, DR. AMINA CHOUDHURY AT [facility]    TUBAL ABDOMINAL LIGATION Bilateral      Social History:  reports that she has been smoking cigarettes. She has a 17.5 pack-year smoking history. She has been exposed to tobacco smoke. She has never used smokeless tobacco. She reports that she does not drink alcohol and does not use drugs.    Family History: family history includes Breast cancer in her maternal grandmother; Cancer in her father and maternal grandmother; Colon polyps in her mother; Diabetes in her father and mother; Heart disease in her father; Stroke in her father.      Allergies:  Allergies   Allergen Reactions    Shrimp (Diagnostic) Swelling    Soybean-Containing Drug Products Diarrhea    Sulfa Antibiotics Swelling    Gluten Meal Rash     Medications:  Prior to Admission medications     Medication Sig Start Date End Date Taking? Authorizing Provider   hydrOXYzine (ATARAX) 10 MG tablet TAKE 1 TABLET BY MOUTH THREE TIMES DAILY AS NEEDED FOR ITCHING 12/1/22  Yes Clare Nguyen APRN   metFORMIN (GLUCOPHAGE) 1000 MG tablet Take 1 tablet by mouth 2 (Two) Times a Day With Meals for 90 days.  Patient taking differently: Take 1 tablet by mouth 2 (Two) Times a Day With Meals. Does not take on a regular basis 1/17/23 2/3/25 Yes Clare Nguyen APRN   albuterol (PROVENTIL) (2.5 MG/3ML) 0.083% nebulizer solution AVN Q 4 H PRF [facility]  Patient not taking: Reported on 2/3/2025 12/22/19   ProviderKaitlin MD   calcipotriene-betamethasone (TACLONEX) 0.005-0.064 % ointment Apply 1 application topically to the appropriate area as directed Daily.  Patient not taking: Reported on 2/3/2025 1/17/23   Clare Nguyen APRN   clonazePAM (KlonoPIN) 1 MG tablet Take 1 tablet by mouth 2 (Two) Times a Day As Needed for Anxiety for up to 30 days. 7/7/22 8/6/22  Clare Nguyen APRN   desonide (DESOWEN) 0.05 % cream APPLY TOPICALLY TO THE AFFECTED AREA TWICE DAILY AS DIRECTED  Patient not taking: Reported on 2/3/2025 10/4/23   Clare Nguyen APRN   estradiol (VIVELLE-DOT) 0.1 MG/24HR patch PLACE 1 PATCH ON THE SKIN AS DIRECTED BY PROVIDER 2 TIMES A WEEK  Patient not taking: Reported on 2/3/2025 1/25/22   Radha Gusman MD   fluticasone (FLONASE) 50 MCG/ACT nasal spray SHAKE LIQUID WELL AND SPRAY TWICE IN EACH NOSTRIL ONCE DAILY  Patient not taking: Reported on 2/3/2025 8/12/22   Clare Nguyen APRN   Fluticasone Furoate-Vilanterol (BREO ELLIPTA) 200-25 MCG/ACT inhaler INHALE 1 PUFF BY MOUTH DAILY  Patient not taking: Reported on 2/3/2025 1/17/24   Clare Nguyen APRN   methylPREDNISolone (MEDROL) 4 MG dose pack Take as directed on package instructions. 7/29/24   Clare Nguyen APRN   mupirocin (BACTROBAN) 2 % ointment Apply 1 application  topically to the appropriate area as directed 2 (Two) Times a Day.  Patient not taking: Reported on 2/3/2025 10/3/22   Clare Nguyen APRN   Polyethylene Glycol 3350 (MIRALAX PO) Take  by mouth.  Patient not taking: Reported on 11/17/2023    Provider, MD Kaitlin       BRUCE: Over the last two weeks, how often have you been bothered by the following problems?  Feeling nervous, anxious or on edge: Several days  Not being able to stop or control worrying: Not at all  Worrying too much about different things: More than half the days  Trouble Relaxing: Not at all  Being so restless that it is hard to sit still: Not at all  Becoming easily annoyed or irritable: Nearly every day  Feeling afraid as if something awful might happen: Not at all  BRUCE 7 Total Score: 6  If you checked any problems, how difficult have these problems made it for you to do your work, take care of things at home, or get along with other people: Very difficult    PHQ-9 Depression Screening  Little interest or pleasure in doing things? Not at all   Feeling down, depressed, or hopeless? Several days   PHQ-2 Total Score 1   Trouble falling or staying asleep, or sleeping too much?     Feeling tired or having little energy?     Poor appetite or overeating?     Feeling bad about yourself - or that you are a failure or have let yourself or your family down?     Trouble concentrating on things, such as reading the newspaper or watching television?     Moving or speaking so slowly that other people could have noticed? Or the opposite - being so fidgety or restless that you have been moving around a lot more than usual?     Thoughts that you would be better off dead, or of hurting yourself in some way?     PHQ-9 Total Score     If you checked off any problems, how difficult have these problems made it for you to do your work, take care of things at home, or get along with other people? Somewhat difficult       PHQ-9 Total Score:   1  1-4 (Minimal  "Depression)  Support given, observe for worsening symptoms, Recommended starting psychotherapy/counseling, and Discussed antidepressant therapy    Objective     Vital Signs: /76 (BP Location: Left arm, Patient Position: Sitting, Cuff Size: Adult)   Pulse 75   Temp 98 °F (36.7 °C) (Oral)   Resp 16   Ht 157.5 cm (62\")   Wt (!) 145 kg (320 lb)   BMI 58.53 kg/m²   Physical Exam  Vitals and nursing note reviewed.   Constitutional:       Appearance: She is morbidly obese.   HENT:      Head: Normocephalic.   Cardiovascular:      Rate and Rhythm: Normal rate and regular rhythm.      Pulses: Normal pulses.      Heart sounds: Normal heart sounds.   Pulmonary:      Effort: Pulmonary effort is normal.      Breath sounds: Normal breath sounds.   Musculoskeletal:         General: Normal range of motion.   Skin:     General: Skin is warm and dry.      Comments: Peeling of hdez surface of bilateral hands and plantar surface of bilateral feet.     Neurological:      General: No focal deficit present.      Mental Status: She is alert and oriented to person, place, and time.   Psychiatric:         Mood and Affect: Mood is anxious and depressed. Affect is tearful.         Behavior: Behavior normal.         Class 3 Severe Obesity (BMI >=40). Obesity-related health conditions include the following: hypertension, impaired fasting glucose, and dyslipidemias. Obesity is newly identified. BMI is is above average; BMI management plan is completed. We discussed low calorie, low carb based diet program, portion control, and increasing exercise.        Advance Care Planning            Results Reviewed:  Glucose   Date Value Ref Range Status   09/28/2022 103 (H) 70 - 99 mg/dL Final     Comment:                   **Please note reference interval change**   02/04/2020 84 65 - 99 mg/dL Final     BUN   Date Value Ref Range Status   09/28/2022 13 6 - 24 mg/dL Final   05/06/2021 9 7 - 20 mg/dL Final     Creatinine   Date Value Ref Range " Status   09/28/2022 0.73 0.57 - 1.00 mg/dL Final   05/06/2021 0.6 (L) 0.7 - 1.5 mg/dL Final     Sodium   Date Value Ref Range Status   09/28/2022 138 134 - 144 mmol/L Final   05/06/2021 140 137 - 145 mmol/L Final     Potassium   Date Value Ref Range Status   09/28/2022 4.4 3.5 - 5.2 mmol/L Final   05/06/2021 4.7 3.5 - 5.1 mmol/L Final     Chloride   Date Value Ref Range Status   09/28/2022 99 96 - 106 mmol/L Final   05/06/2021 104 98 - 107 mmol/L Final     Total CO2   Date Value Ref Range Status   09/28/2022 24 20 - 29 mmol/L Final   05/06/2021 27 22 - 30 mmol/L Final     Calcium   Date Value Ref Range Status   09/28/2022 9.5 8.7 - 10.2 mg/dL Final   05/06/2021 8.9 8.4 - 10.2 mg/dL Final     ALT (SGPT)   Date Value Ref Range Status   09/28/2022 15 0 - 32 IU/L Final   05/06/2021 18 13 - 69 U/L Final     AST (SGOT)   Date Value Ref Range Status   09/28/2022 14 0 - 40 IU/L Final   05/06/2021 26 15 - 46 U/L Final     WBC   Date Value Ref Range Status   09/28/2022 8.5 3.4 - 10.8 x10E3/uL Final   01/11/2020 9.1 4.5 - 11.0 10*3/uL Final     Hematocrit   Date Value Ref Range Status   09/28/2022 43.0 34.0 - 46.6 % Final   02/04/2020 32.4 (L) 34.0 - 46.6 % Final   01/11/2020 41.1 36.0 - 46.0 % Final     Platelets   Date Value Ref Range Status   09/28/2022 293 150 - 450 x10E3/uL Final   02/04/2020 286 140 - 450 10*3/mm3 Final   01/11/2020 253 140 - 440 10*3/uL Final     Triglycerides   Date Value Ref Range Status   11/04/2022 215 (H) 0 - 149 mg/dL Final     HDL Cholesterol   Date Value Ref Range Status   11/04/2022 41 >39 mg/dL Final     LDL Chol Calc (NIH)   Date Value Ref Range Status   11/04/2022 142 (H) 0 - 99 mg/dL Final     Hemoglobin A1C   Date Value Ref Range Status   06/08/2023 6.1 % Final   05/06/2021 5.9 (H) 4.3 - 5.6 % Final     Comment:     (note)  A1C% Reference Range:  4.3 - 5.6  Normal range  5.7 - 6.4  Pre-diabetic -increased risk for developing diabetes mellitus.  >=6.5      Diabetic -diagnostic of diabetes  mellitus.     Note: For diagnosis of diabetes in individuals without unequivocal   hyperglycemia, results should be confirmed by repeat testing.  Patients with conditions that shorten erythrocyte survival, such as  recovery from acute blood loss, hemolytic anemia, kidney disease,  or the presence of unstable hemoglobins like HbSS, HbCC, and HbSC  may yield falsely decreased HbA1c test results. Iron deficiency may  yield falsely increased HbA1c test results.         Assessment / Plan     Assessment/Plan     Diagnoses and all orders for this visit:    1. Intrinsic eczema (Primary)  -     Discontinue: dexAMETHasone (DECADRON) injection 10 mg  -     mupirocin (BACTROBAN) 2 % ointment; Apply 1 Application topically to the appropriate area as directed 2 (Two) Times a Day.  Dispense: 90 g; Refill: 1  -     silver sulfadiazine (SILVADENE, SSD) 1 % cream; Apply 1 Application topically to the appropriate area as directed 2 (Two) Times a Day.  Dispense: 400 g; Refill: 0    2. Generalized anxiety disorder  -     DULoxetine (CYMBALTA) 30 MG capsule; Take 1 capsule by mouth Daily.  Dispense: 30 capsule; Refill: 2    3. Eczema, unspecified type  -     hydrOXYzine (ATARAX) 10 MG tablet; Take 1 tablet by mouth 3 (Three) Times a Day As Needed for Itching.  Dispense: 270 tablet; Refill: 0    4. Acute pain of left foot  -     ketorolac (TORADOL) injection 30 mg  -     oxyCODONE-acetaminophen (Percocet) 5-325 MG per tablet; Take 1 tablet by mouth Every 4 (Four) Hours As Needed for Moderate Pain.  Dispense: 18 tablet; Refill: 0  -     triamcinolone acetonide (KENALOG-40) injection 40 mg    5. Acute foot pain, right  -     ketorolac (TORADOL) injection 30 mg  -     oxyCODONE-acetaminophen (Percocet) 5-325 MG per tablet; Take 1 tablet by mouth Every 4 (Four) Hours As Needed for Moderate Pain.  Dispense: 18 tablet; Refill: 0  -     triamcinolone acetonide (KENALOG-40) injection 40 mg    6. Morbid obesity with BMI of 50.0-59.9,  adult               An After Visit Summary was printed and given to the patient at discharge.  Return in about 4 weeks (around 3/3/2025) for Annual physical.    I have discussed the patient results/orders and plan/recommendation with them at today's visit.      Clare Nguyen, APRN   02/03/2025

## 2025-02-18 ENCOUNTER — HOSPITAL ENCOUNTER (OUTPATIENT)
Dept: WOMENS IMAGING | Age: 55
Discharge: HOME | End: 2025-02-18
Payer: COMMERCIAL

## 2025-02-18 VITALS — WEIGHT: 293 LBS | BODY MASS INDEX: 53.92 KG/M2 | HEIGHT: 62 IN

## 2025-02-18 DIAGNOSIS — Z12.31 ENCOUNTER FOR SCREENING MAMMOGRAM FOR MALIGNANT NEOPLASM OF BREAST: ICD-10-CM

## 2025-02-18 PROCEDURE — 77063 BREAST TOMOSYNTHESIS BI: CPT

## 2025-02-21 DIAGNOSIS — M79.671 ACUTE FOOT PAIN, RIGHT: ICD-10-CM

## 2025-02-21 DIAGNOSIS — M79.672 ACUTE PAIN OF LEFT FOOT: ICD-10-CM

## 2025-02-21 NOTE — TELEPHONE ENCOUNTER
Caller: Cari Watson    Relationship: Self    Best call back number:[phone]    Requested Prescriptions:   Requested Prescriptions     Pending Prescriptions Disp Refills    oxyCODONE-acetaminophen (Percocet) 5-325 MG per tablet 18 tablet 0     Sig: Take 1 tablet by mouth Every 4 (Four) Hours As Needed for Moderate Pain.        Pharm[facility] where request [facility] be sent[id number] [address] AT [address] [phone] [facility] [phone]      Last office visit with prescribing clinician: 2/3/2025   Last telemedicine visit with prescribing clinician: Visit date not found   Next office visit with prescribing clinician: 3/4/2025     Additional details provided by patient: PATIENT STATES SHE HAS BEEN OUT OF MEDICATION FOR A COUPLE OF DAYS NOW     Does the patient have less than a 3 day supply:  [x] Yes  [] No    Would you like a call back once the refill request has been completed: [x] Yes [] No    If the office needs to give you a call back, can they leave a voicemail: [x] Yes [] No    Orlin Oscar Rep   02/21/25 14:27 CST

## 2025-02-23 RX ORDER — OXYCODONE AND ACETAMINOPHEN 5; 325 MG/1; MG/1
1 TABLET ORAL EVERY 4 HOURS PRN
Qty: 18 TABLET | Refills: 0 | OUTPATIENT
Start: 2025-02-23

## 2025-02-24 NOTE — TELEPHONE ENCOUNTER
PATIENT STATED SHE IS COMPLETELY OUT OF THIS MEDICATION AND HAS BEEN FOR OVER A WEEK. SHE DOES HAVE AN APPOINTMENT WITH A DERMATOLOGIST IN [place].     PATIENT STATED HER LAST REFILL ON 02.03.2025 WAS ONLY FOR 18 TABLETS.    PLEASE CALL TO DISCUSS AND ADVISE.

## 2025-03-28 ENCOUNTER — OFFICE VISIT (OUTPATIENT)
Dept: SURGERY | Facility: CLINIC | Age: 55
End: 2025-03-28
Payer: COMMERCIAL

## 2025-03-28 VITALS
OXYGEN SATURATION: 97 % | BODY MASS INDEX: 53.92 KG/M2 | HEART RATE: 82 BPM | HEIGHT: 62 IN | SYSTOLIC BLOOD PRESSURE: 132 MMHG | DIASTOLIC BLOOD PRESSURE: 86 MMHG | WEIGHT: 293 LBS

## 2025-03-28 DIAGNOSIS — N81.6 RECTOCELE: Primary | ICD-10-CM

## 2025-03-28 RX ORDER — CLOBETASOL PROPIONATE 0.5 MG/G
CREAM TOPICAL
COMMUNITY
Start: 2025-03-15

## 2025-03-28 RX ORDER — PRENATAL VIT 91/IRON/FOLIC/DHA 28-975-200
COMBINATION PACKAGE (EA) ORAL
COMMUNITY
Start: 2025-03-17

## 2025-03-28 NOTE — PROGRESS NOTES
Cari Watson is a 54 y.o. female who is seen as a consult at the request of Self Referring for Rectal Bleeding.      HPI:  History of Present Illness  The patient is a 54-year-old female who presents for follow-up. She was last seen in 2020 for diverticulitis. At that time, her BMI was 56, and it has since increased to 59.7. Her last colonoscopy was in 2014, which revealed a hyperplastic polyp and diverticulosis. She underwent hernia repair on 11/20/2024.    She has been experiencing gastrointestinal discomfort, including nausea, for the past few weeks. She has been managing psoriasis on her hands and feet for the past 3 years, recently diagnosed by a dermatologist. Previously, she was diagnosed with eczema. Despite healing of her feet, she developed a prickly sensation all over her body and a rash on her torso in the last couple of weeks. She is uncertain if these symptoms are related to her current visit or indicative of a separate issue. A CT scan in November revealed multiple gallstones. Her blood work was largely within normal limits, except for an A1c of 6.1.    She has been dealing with rectal prolapse into her vagina, which she can manually reduce. The prolapse enlarges when her rectum is full. She was due for a colonoscopy at the onset of the COVID-19 pandemic, which was subsequently canceled, and she has not had one in approximately 8 years. She has concerns about itching and other symptoms but believes she would be severely ill with fever if there were a tear. She has undergone 2 CT scans in the past 4 years, the first during a period of illness related to her hernia, which was reducible, and the second when the hernia began to gangrene. She has been fortunate to avoid serious bouts of illness, experiencing only mild episodes that resolve spontaneously.    She has a history of irritable bowel syndrome (IBS) and is apprehensive about bariatric surgery. She has previously lost 80 pounds independently.  She reports significant stress but does not take any medication for it. Her stools have been mostly pellet-like for the last 3 to 4 years but have recently become more fluffy. She does not take fiber or stool softeners but maintains a diet rich in vegetables, salads, and sweet potatoes. She has MiraLAX at home but does not use it. She finds that beans and fibrous foods exacerbate her IBS symptoms.    She has a history of smoking for 40 years but quit 3 months ago. She underwent a hysterectomy at age 45 and used estrogen patches for 9 years, which she discontinued a year ago. She still has the patches and is considering resuming their use.    SOCIAL HISTORY  The patient has been a smoker for 40 years but quit smoking 3 months ago.    MEDICATIONS  Past: estrogen patch       Past Medical History:   Diagnosis Date    Allergic dermatitis 01/28/2020    Anal fistula 03/2015    S/P FISTULOTOMY    Anxiety     Arthritis     Asthma 2015    Stopped smoking -no meds    Back pain     Cancer 2010    Skin, on neck    Cervical adenopathy 04/2017    Cholelithiasis 2023    Needs removing    Chronic fatigue     Colon polyps     COPD (chronic obstructive pulmonary disease) 2015    Possible but stopped smoking    Diabetes mellitus 2022    Pre diabetes A1c 6.1    Diverticulitis 02/01/2020    ADMITTED TO [facility]    Diverticulitis 11/17/2019    ADMITTED TO [facility]    Diverticulitis 06/19/2019    ADMITTED TO [facility]    Diverticulitis 12/21/2012    Diverticulitis 04/08/2013    Diverticulitis of colon     DUB (dysfunctional uterine bleeding) 07/2015    Eczema     Environmental allergies     Exposure to hepatitis C     Fibrocystic breast     Fissure, anal     Fluid retention in legs     Folliculitis     Hematuria 10/2018    Hemorrhoids     Hormone replacement therapy (HRT)     VIVELLE PATCH    Hypersomnia     Hypertension     IBS (irritable bowel syndrome)     Impacted cerumen     Incarcerated ventral hernia 02/26/2019    NO SURGICAL REPAIR, SAW   JOHN JOAQUIN JR    Leg cramps 09/2013    Obesity     JR on CPAP     FOLLOWED BY DR. ALEXX FLETCHER    Panic attacks     Perirectal abscess     Psoriasis     Rectal bleeding     Sepsis without acute organ dysfunction 02/02/2020    Wheezing 12/2019    Yeast infection     CHRONIC       Past Surgical History:   Procedure Laterality Date    ANAL FISTULOTOMY N/A 03/12/2015    Dr. Jazz Mccartney AT [facility]    BILATERAL SALPINGO OOPHORECTOMY Bilateral 01/18/2016    WITH HYSTERECTOMY, DR. AMINA CHOUDHURY AT [facility]    COLONOSCOPY N/A 12/11/2014    SIGMOID DIVERTICULOSIS, 5 MM HYPERPLASTIC POLYP IN RECTUM, RESCOPE IN 5 YRS, RANDOM COLON BX BENIGN, DR. KERON CLARK AT [facility]    COLONOSCOPY W/ POLYPECTOMY N/A 06/23/2010    DIVERTICULOSIS, DIVERTICULITIS IN SIGMOID, SMALL POLYP IN RECTOSIGMOID, DR. JESUS MANUEL BLOOD AT [facility]    ENDOSCOPY N/A 12/11/2014    ENTIRE EGD WNL, RANDOM BIOPSIES BENIGN, DR. KERON CLARK AT [facility]    HEMORRHOIDECTOMY N/A 03/12/2015    Internal / external hemorrhoidectomy x2 - Dr. Jazz Mccartney AT [facility]    HYSTERECTOMY N/A 01/18/2016    LAPAROSCOPIC HYSTERECTOMY WITH BSO, DR. AMINA CHOUDHURY AT [facility]    TUBAL ABDOMINAL LIGATION Bilateral        Social History:   reports that she quit smoking about 4 months ago. Her smoking use included cigarettes. She started smoking about 41 years ago. She has a 58.4 pack-year smoking history. She has been exposed to tobacco smoke. She has never used smokeless tobacco. She reports that she does not drink alcohol and does not use drugs.      Marriage status:     Family History   Problem Relation Age of Onset    Breast cancer Maternal Grandmother     Cancer Maternal Grandmother     Diabetes Maternal Grandfather     Diabetes Paternal Grandmother     Diabetes Mother     Colon polyps Mother     Cancer Father         Prostate.    Heart disease Father     Diabetes Father     Stroke Father          Current Outpatient Medications:     clobetasol propionate (TEMOVATE) 0.05 % cream,  APPLY TWICE DAILY TO AFFECTED AREAS OF SKIN ON THE BODY WHEN AND WHERE RASH IS PRESENT AND FLARED, Disp: , Rfl:     desonide (DESOWEN) 0.05 % cream, APPLY TOPICALLY TO THE AFFECTED AREA TWICE DAILY AS DIRECTED, Disp: 60 g, Rfl: 5    hydrOXYzine (ATARAX) 10 MG tablet, Take 1 tablet by mouth 3 (Three) Times a Day As Needed for Itching., Disp: 270 tablet, Rfl: 0    mupirocin (BACTROBAN) 2 % ointment, Apply 1 Application topically to the appropriate area as directed 2 (Two) Times a Day., Disp: 90 g, Rfl: 1    silver sulfadiazine (SILVADENE, SSD) 1 % cream, Apply 1 Application topically to the appropriate area as directed 2 (Two) Times a Day., Disp: 400 g, Rfl: 0    terbinafine (lamISIL) 1 % cream, APPLY TWICE DAILY TO AFFECTED AREA OF SKIN ON THE FEET WHEN AND WHERE RASH IS PRESENT. USE IN COMBINATION WITH CLOBETASOL, Disp: , Rfl:     estradiol-norethindrone (COMBIPATCH) 0.05-0.14 MG/DAY patch, Place 1 patch on the skin as directed by provider 2 (Two) Times a Week., Disp: , Rfl:     Allergy  Shrimp (diagnostic), Soybean-containing drug products, Sulfa antibiotics, and Gluten meal      Vitals:    03/28/25 0900   BP: 132/86   Pulse: 82   SpO2: 97%     Body mass index is 59.72 kg/m².      Physical Exam    Vital Signs  BMI is 59.7.  Physical Exam  Constitutional:       General: She is not in acute distress.     Appearance: She is well-developed.   HENT:      Head: Normocephalic and atraumatic.   Abdominal:      General: There is no distension.      Palpations: Abdomen is soft.   Genitourinary:     Comments: Perianal exam shows minor tags. Digital rectal exam reveals no masses felt. Rectocele identified. Perineum shows some scar tissue from the previous episiotomy and there is a web of skin in the posterior fourchette that appears to have a small tear in it but no dysplasia noted.    Musculoskeletal:         General: Normal range of motion.   Neurological:      Mental Status: She is alert.   Psychiatric:         Thought  Content: Thought content normal.         Review of Medical Record:  I reviewed medical records as detailed in HPI.     Assessment & Plan  1. Rectocele into her vagina, which she can manually reduce. The condition worsens when her rectum is full. A digital rectal exam confirmed the presence of a rectocele. She is advised to lose weight and increase fiber intake to reduce intra-abdominal pressure. A referral to a urogynecologist will be made for further evaluation and management.    2. Gallstones.  She has a history of gallstones confirmed by a CT scan in November. She reports occasional pain, which she initially attributed to gas or overeating. A consultation with Dr. Vega will be arranged to discuss potential gallbladder surgery.    3. Psoriasis.  She has been experiencing severe psoriasis on her hands and feet for the past three years, recently diagnosed by a dermatologist. She reports a new rash on her torso and a prickly sensation all over her body. She is advised to follow up with her dermatologist for further management.    4. Irritable Bowel Syndrome (IBS).  She reports changes in her stool consistency over the past few weeks. She is advised to continue her high-fiber diet and consider using FiberCon supplements. If symptoms persist, further evaluation may be necessary.    5. Health Maintenance.  Her last colonoscopy was in 2014. She is overdue for another colonoscopy. A referral to a gastroenterologist in [place] will be made to schedule the procedure.    6. Medication Management.  She inquired about resuming estrogen patches, which she discontinued a year ago. She is advised to consult her gynecologist regarding the use of estrogen patches or cream.    I spent 43 minutes caring for Cari Watson on this date of service. This time includes time spent by me in the following activities:preparing for the visit, reviewing tests, obtaining and/or reviewing a separately obtained history, performing a medically  appropriate examination and/or evaluation , counseling and educating the patient/family/caregiver, ordering medications, tests, or procedures, referring and communicating with other health care professionals  and independently interpreting results and communicating that information with the patient/family/caregiver      Patient or patient representative verbalized consent for the use of Ambient Listening during the visit with  Jazz Mccartney MD for chart documentation. 4/11/2025  17:38 EDT

## 2025-05-28 ENCOUNTER — DOCUMENTATION (OUTPATIENT)
Dept: FAMILY MEDICINE CLINIC | Facility: CLINIC | Age: 55
End: 2025-05-28
Payer: COMMERCIAL

## 2025-07-21 ENCOUNTER — TELEPHONE (OUTPATIENT)
Dept: SURGERY | Facility: CLINIC | Age: 55
End: 2025-07-21
Payer: COMMERCIAL

## 2025-08-05 ENCOUNTER — APPOINTMENT (OUTPATIENT)
Dept: CT IMAGING | Age: 55
End: 2025-08-05
Payer: COMMERCIAL

## 2025-08-05 ENCOUNTER — HOSPITAL ENCOUNTER (EMERGENCY)
Age: 55
Discharge: HOME | End: 2025-08-05
Attending: EMERGENCY MEDICINE
Payer: COMMERCIAL

## 2025-08-05 VITALS
WEIGHT: 293 LBS | TEMPERATURE: 97.8 F | OXYGEN SATURATION: 97 % | HEIGHT: 62 IN | DIASTOLIC BLOOD PRESSURE: 83 MMHG | SYSTOLIC BLOOD PRESSURE: 148 MMHG | RESPIRATION RATE: 18 BRPM | BODY MASS INDEX: 53.92 KG/M2 | HEART RATE: 78 BPM

## 2025-08-05 DIAGNOSIS — Z87.19 HISTORY OF VENTRAL HERNIA REPAIR: ICD-10-CM

## 2025-08-05 DIAGNOSIS — R10.30 LOWER ABDOMINAL PAIN: Primary | ICD-10-CM

## 2025-08-05 DIAGNOSIS — Z98.890 HISTORY OF VENTRAL HERNIA REPAIR: ICD-10-CM

## 2025-08-05 LAB
ALBUMIN SERPL-MCNC: 4.1 G/DL (ref 3.5–5.2)
ALP SERPL-CCNC: 72 U/L (ref 35–104)
ALT SERPL-CCNC: 20 U/L (ref 10–35)
ANION GAP SERPL CALCULATED.3IONS-SCNC: 14 MMOL/L (ref 8–16)
AST SERPL-CCNC: 18 U/L (ref 10–35)
BACTERIA URNS QL MICRO: NEGATIVE /HPF
BASOPHILS # BLD: 0 K/UL (ref 0–0.2)
BASOPHILS NFR BLD: 0.4 % (ref 0–1)
BILIRUB SERPL-MCNC: 0.3 MG/DL (ref 0.2–1.2)
BILIRUB UR QL STRIP: NEGATIVE
BUN SERPL-MCNC: 15 MG/DL (ref 6–20)
CALCIUM SERPL-MCNC: 9.3 MG/DL (ref 8.6–10)
CHLORIDE SERPL-SCNC: 104 MMOL/L (ref 98–107)
CLARITY UR: CLEAR
CO2 SERPL-SCNC: 23 MMOL/L (ref 22–29)
COLOR UR: YELLOW
CREAT SERPL-MCNC: 0.8 MG/DL (ref 0.5–0.9)
CRYSTALS URNS MICRO: NORMAL /HPF
EOSINOPHIL # BLD: 0.3 K/UL (ref 0–0.6)
EOSINOPHIL NFR BLD: 2.8 % (ref 0–5)
EPI CELLS #/AREA URNS AUTO: 7 /HPF (ref 0–5)
ERYTHROCYTE [DISTWIDTH] IN BLOOD BY AUTOMATED COUNT: 13 % (ref 11.5–14.5)
GLUCOSE SERPL-MCNC: 119 MG/DL (ref 70–99)
GLUCOSE UR STRIP.AUTO-MCNC: NEGATIVE MG/DL
HCT VFR BLD AUTO: 41.6 % (ref 37–47)
HGB BLD-MCNC: 13.7 G/DL (ref 12–16)
HGB UR STRIP.AUTO-MCNC: ABNORMAL MG/L
HYALINE CASTS #/AREA URNS AUTO: 1 /HPF (ref 0–8)
IMM GRANULOCYTES # BLD: 0 K/UL
KETONES UR STRIP.AUTO-MCNC: NEGATIVE MG/DL
LEUKOCYTE ESTERASE UR QL STRIP.AUTO: NEGATIVE
LIPASE SERPL-CCNC: 27 U/L (ref 13–60)
LYMPHOCYTES # BLD: 2.8 K/UL (ref 1.1–4.5)
LYMPHOCYTES NFR BLD: 28.6 % (ref 20–40)
MCH RBC QN AUTO: 31.6 PG (ref 27–31)
MCHC RBC AUTO-ENTMCNC: 32.9 G/DL (ref 33–37)
MCV RBC AUTO: 95.9 FL (ref 81–99)
MONOCYTES # BLD: 0.6 K/UL (ref 0–0.9)
MONOCYTES NFR BLD: 6.4 % (ref 0–10)
NEUTROPHILS # BLD: 6 K/UL (ref 1.5–7.5)
NEUTS SEG NFR BLD: 61.5 % (ref 50–65)
NITRITE UR QL STRIP.AUTO: NEGATIVE
PH UR STRIP.AUTO: 6 [PH] (ref 5–8)
PLATELET # BLD AUTO: 249 K/UL (ref 130–400)
PMV BLD AUTO: 9.4 FL (ref 9.4–12.3)
POTASSIUM SERPL-SCNC: 4 MMOL/L (ref 3.5–5.1)
PROT SERPL-MCNC: 7.2 G/DL (ref 6.4–8.3)
PROT UR STRIP.AUTO-MCNC: NEGATIVE MG/DL
RBC # BLD AUTO: 4.34 M/UL (ref 4.2–5.4)
RBC #/AREA URNS AUTO: 4 /HPF (ref 0–4)
SODIUM SERPL-SCNC: 141 MMOL/L (ref 136–145)
SP GR UR STRIP.AUTO: >=1.045 (ref 1–1.03)
UROBILINOGEN UR STRIP.AUTO-MCNC: 0.2 E.U./DL
WBC # BLD AUTO: 9.7 K/UL (ref 4.8–10.8)
WBC #/AREA URNS AUTO: 2 /HPF (ref 0–5)

## 2025-08-05 PROCEDURE — 36415 COLL VENOUS BLD VENIPUNCTURE: CPT

## 2025-08-05 PROCEDURE — 80053 COMPREHEN METABOLIC PANEL: CPT

## 2025-08-05 PROCEDURE — 81001 URINALYSIS AUTO W/SCOPE: CPT

## 2025-08-05 PROCEDURE — 83690 ASSAY OF LIPASE: CPT

## 2025-08-05 PROCEDURE — 85025 COMPLETE CBC W/AUTO DIFF WBC: CPT

## 2025-08-05 PROCEDURE — 74177 CT ABD & PELVIS W/CONTRAST: CPT

## 2025-08-05 PROCEDURE — 99285 EMERGENCY DEPT VISIT HI MDM: CPT

## 2025-08-05 PROCEDURE — 6360000004 HC RX CONTRAST MEDICATION: Performed by: EMERGENCY MEDICINE

## 2025-08-05 RX ORDER — IOPAMIDOL 755 MG/ML
90 INJECTION, SOLUTION INTRAVASCULAR
Status: COMPLETED | OUTPATIENT
Start: 2025-08-05 | End: 2025-08-05

## 2025-08-05 RX ORDER — PREDNISONE 5 MG/1
5 TABLET ORAL DAILY
COMMUNITY

## 2025-08-05 RX ORDER — PANTOPRAZOLE SODIUM 40 MG/1
40 TABLET, DELAYED RELEASE ORAL
Qty: 60 TABLET | Refills: 0 | Status: SHIPPED | OUTPATIENT
Start: 2025-08-05 | End: 2025-09-04

## 2025-08-05 RX ORDER — ONDANSETRON 4 MG/1
4 TABLET, ORALLY DISINTEGRATING ORAL 3 TIMES DAILY PRN
Qty: 21 TABLET | Refills: 0 | Status: SHIPPED | OUTPATIENT
Start: 2025-08-05

## 2025-08-05 RX ORDER — HYOSCYAMINE SULFATE 0.12 MG/1
0.12 TABLET SUBLINGUAL
Qty: 30 TABLET | Refills: 0 | Status: SHIPPED | OUTPATIENT
Start: 2025-08-05

## 2025-08-05 RX ADMIN — IOPAMIDOL 90 ML: 755 INJECTION, SOLUTION INTRAVENOUS at 19:32

## 2025-08-05 ASSESSMENT — ENCOUNTER SYMPTOMS
EYES NEGATIVE: 1
ABDOMINAL DISTENTION: 1
RESPIRATORY NEGATIVE: 1
ABDOMINAL PAIN: 1
NAUSEA: 1

## 2025-08-06 RX ORDER — PANTOPRAZOLE SODIUM 40 MG/1
40 TABLET, DELAYED RELEASE ORAL
Qty: 180 TABLET | OUTPATIENT
Start: 2025-08-06